# Patient Record
Sex: FEMALE | Race: WHITE | Employment: FULL TIME | URBAN - METROPOLITAN AREA
[De-identification: names, ages, dates, MRNs, and addresses within clinical notes are randomized per-mention and may not be internally consistent; named-entity substitution may affect disease eponyms.]

---

## 2017-05-15 RX ORDER — ESTRADIOL 1 MG/1
TABLET ORAL
Qty: 30 TABLET | Refills: 1 | Status: SHIPPED | OUTPATIENT
Start: 2017-05-15 | End: 2017-07-24 | Stop reason: SDUPTHER

## 2017-05-20 ENCOUNTER — EMPLOYEE WELLNESS (OUTPATIENT)
Dept: OTHER | Age: 52
End: 2017-05-20

## 2017-05-20 LAB
CHOLESTEROL/HDL RATIO: 5.5
CHOLESTEROL: 246 MG/DL
GLUCOSE BLD-MCNC: 101 MG/DL (ref 70–99)
HDLC SERPL-MCNC: 45 MG/DL
LDL CHOLESTEROL: 160 MG/DL (ref 0–130)
PATIENT FASTING?: ABNORMAL
TRIGL SERPL-MCNC: 207 MG/DL
VLDLC SERPL CALC-MCNC: ABNORMAL MG/DL (ref 1–30)

## 2017-07-14 ENCOUNTER — APPOINTMENT (OUTPATIENT)
Dept: GENERAL RADIOLOGY | Age: 52
End: 2017-07-14
Payer: COMMERCIAL

## 2017-07-14 ENCOUNTER — HOSPITAL ENCOUNTER (EMERGENCY)
Age: 52
Discharge: HOME | End: 2017-07-15
Attending: EMERGENCY MEDICINE
Payer: COMMERCIAL

## 2017-07-14 VITALS
OXYGEN SATURATION: 96 % | WEIGHT: 200 LBS | HEART RATE: 68 BPM | TEMPERATURE: 97.7 F | BODY MASS INDEX: 32.28 KG/M2 | SYSTOLIC BLOOD PRESSURE: 168 MMHG | DIASTOLIC BLOOD PRESSURE: 84 MMHG | RESPIRATION RATE: 20 BRPM

## 2017-07-14 DIAGNOSIS — R68.84 JAW PAIN: Primary | ICD-10-CM

## 2017-07-14 PROCEDURE — 99283 EMERGENCY DEPT VISIT LOW MDM: CPT

## 2017-07-14 PROCEDURE — 96372 THER/PROPH/DIAG INJ SC/IM: CPT

## 2017-07-14 PROCEDURE — 70110 X-RAY EXAM OF JAW 4/> VIEWS: CPT

## 2017-07-14 PROCEDURE — 6360000002 HC RX W HCPCS: Performed by: EMERGENCY MEDICINE

## 2017-07-14 RX ORDER — FENTANYL CITRATE 50 UG/ML
100 INJECTION, SOLUTION INTRAMUSCULAR; INTRAVENOUS ONCE
Status: COMPLETED | OUTPATIENT
Start: 2017-07-14 | End: 2017-07-14

## 2017-07-14 RX ADMIN — FENTANYL CITRATE 100 MCG: 50 INJECTION INTRAMUSCULAR; INTRAVENOUS at 23:38

## 2017-07-14 ASSESSMENT — PAIN DESCRIPTION - FREQUENCY: FREQUENCY: CONTINUOUS

## 2017-07-14 ASSESSMENT — PAIN DESCRIPTION - ORIENTATION: ORIENTATION: LEFT

## 2017-07-14 ASSESSMENT — PAIN SCALES - GENERAL
PAINLEVEL_OUTOF10: 7
PAINLEVEL_OUTOF10: 8

## 2017-07-14 ASSESSMENT — PAIN DESCRIPTION - DESCRIPTORS: DESCRIPTORS: CONSTANT

## 2017-07-14 ASSESSMENT — PAIN DESCRIPTION - PAIN TYPE: TYPE: ACUTE PAIN

## 2017-07-14 ASSESSMENT — PAIN DESCRIPTION - ONSET: ONSET: SUDDEN

## 2017-07-14 ASSESSMENT — PAIN DESCRIPTION - LOCATION: LOCATION: JAW

## 2017-07-15 PROCEDURE — 96372 THER/PROPH/DIAG INJ SC/IM: CPT

## 2017-07-15 PROCEDURE — 6360000002 HC RX W HCPCS: Performed by: EMERGENCY MEDICINE

## 2017-07-15 RX ORDER — IBUPROFEN 800 MG/1
800 TABLET ORAL EVERY 8 HOURS PRN
Qty: 30 TABLET | Refills: 0 | Status: SHIPPED | OUTPATIENT
Start: 2017-07-15 | End: 2019-11-07

## 2017-07-15 RX ORDER — DIAZEPAM 5 MG/ML
2.5 INJECTION, SOLUTION INTRAMUSCULAR; INTRAVENOUS ONCE
Status: COMPLETED | OUTPATIENT
Start: 2017-07-15 | End: 2017-07-15

## 2017-07-15 RX ORDER — DIAZEPAM 5 MG/1
5 TABLET ORAL EVERY 8 HOURS PRN
Qty: 15 TABLET | Refills: 0 | Status: SHIPPED | OUTPATIENT
Start: 2017-07-15 | End: 2017-07-25

## 2017-07-15 RX ORDER — HYDROCODONE BITARTRATE AND ACETAMINOPHEN 5; 325 MG/1; MG/1
1 TABLET ORAL EVERY 8 HOURS PRN
Qty: 5 TABLET | Refills: 0 | Status: SHIPPED | OUTPATIENT
Start: 2017-07-15 | End: 2017-07-22

## 2017-07-15 RX ADMIN — DIAZEPAM 2.5 MG: 5 INJECTION, SOLUTION INTRAMUSCULAR; INTRAVENOUS at 00:24

## 2017-07-15 ASSESSMENT — ENCOUNTER SYMPTOMS
SORE THROAT: 0
FACIAL SWELLING: 0
SHORTNESS OF BREATH: 0
TROUBLE SWALLOWING: 0

## 2017-07-24 RX ORDER — ESTRADIOL 1 MG/1
TABLET ORAL
Qty: 30 TABLET | Refills: 0 | Status: SHIPPED | OUTPATIENT
Start: 2017-07-24 | End: 2018-10-24 | Stop reason: ALTCHOICE

## 2017-07-27 ENCOUNTER — OFFICE VISIT (OUTPATIENT)
Dept: OBGYN CLINIC | Age: 52
End: 2017-07-27
Payer: COMMERCIAL

## 2017-07-27 VITALS
HEIGHT: 66 IN | DIASTOLIC BLOOD PRESSURE: 80 MMHG | SYSTOLIC BLOOD PRESSURE: 120 MMHG | WEIGHT: 234 LBS | BODY MASS INDEX: 37.61 KG/M2

## 2017-07-27 DIAGNOSIS — Z12.31 ENCOUNTER FOR SCREENING MAMMOGRAM FOR MALIGNANT NEOPLASM OF BREAST: Primary | ICD-10-CM

## 2017-07-27 PROCEDURE — 99396 PREV VISIT EST AGE 40-64: CPT | Performed by: OBSTETRICS & GYNECOLOGY

## 2017-07-27 ASSESSMENT — ENCOUNTER SYMPTOMS
ABDOMINAL DISTENTION: 0
SHORTNESS OF BREATH: 0
ABDOMINAL PAIN: 0
BACK PAIN: 0
COUGH: 0
DIARRHEA: 0
CONSTIPATION: 0

## 2017-08-30 ENCOUNTER — HOSPITAL ENCOUNTER (OUTPATIENT)
Dept: MAMMOGRAPHY | Age: 52
Discharge: HOME | End: 2017-08-30
Payer: COMMERCIAL

## 2017-08-30 DIAGNOSIS — Z12.31 ENCOUNTER FOR SCREENING MAMMOGRAM FOR MALIGNANT NEOPLASM OF BREAST: ICD-10-CM

## 2017-08-30 PROCEDURE — G0202 SCR MAMMO BI INCL CAD: HCPCS

## 2017-09-14 ENCOUNTER — ANESTHESIA EVENT (OUTPATIENT)
Dept: OPERATING ROOM | Age: 52
End: 2017-09-14
Payer: COMMERCIAL

## 2017-09-15 ENCOUNTER — HOSPITAL ENCOUNTER (OUTPATIENT)
Age: 52
Setting detail: OUTPATIENT SURGERY
Discharge: HOME | End: 2017-09-15
Attending: SURGERY | Admitting: SURGERY
Payer: COMMERCIAL

## 2017-09-15 ENCOUNTER — ANESTHESIA (OUTPATIENT)
Dept: OPERATING ROOM | Age: 52
End: 2017-09-15
Payer: COMMERCIAL

## 2017-09-15 VITALS
BODY MASS INDEX: 37.61 KG/M2 | HEART RATE: 62 BPM | OXYGEN SATURATION: 96 % | DIASTOLIC BLOOD PRESSURE: 60 MMHG | HEIGHT: 66 IN | RESPIRATION RATE: 16 BRPM | SYSTOLIC BLOOD PRESSURE: 144 MMHG | TEMPERATURE: 97.5 F | WEIGHT: 234 LBS

## 2017-09-15 VITALS — DIASTOLIC BLOOD PRESSURE: 68 MMHG | OXYGEN SATURATION: 97 % | SYSTOLIC BLOOD PRESSURE: 160 MMHG

## 2017-09-15 PROCEDURE — 2500000003 HC RX 250 WO HCPCS: Performed by: ANESTHESIOLOGY

## 2017-09-15 PROCEDURE — 3700000000 HC ANESTHESIA ATTENDED CARE: Performed by: SURGERY

## 2017-09-15 PROCEDURE — 7100000001 HC PACU RECOVERY - ADDTL 15 MIN: Performed by: SURGERY

## 2017-09-15 PROCEDURE — 87077 CULTURE AEROBIC IDENTIFY: CPT

## 2017-09-15 PROCEDURE — 88305 TISSUE EXAM BY PATHOLOGIST: CPT

## 2017-09-15 PROCEDURE — 7100000031 HC ASPR PHASE II RECOVERY - ADDTL 15 MIN: Performed by: SURGERY

## 2017-09-15 PROCEDURE — 6360000002 HC RX W HCPCS: Performed by: ANESTHESIOLOGY

## 2017-09-15 PROCEDURE — 7100000011 HC PHASE II RECOVERY - ADDTL 15 MIN: Performed by: SURGERY

## 2017-09-15 PROCEDURE — 2580000003 HC RX 258: Performed by: ANESTHESIOLOGY

## 2017-09-15 PROCEDURE — 3609012400 HC EGD TRANSORAL BIOPSY SINGLE/MULTIPLE: Performed by: SURGERY

## 2017-09-15 PROCEDURE — 88342 IMHCHEM/IMCYTCHM 1ST ANTB: CPT

## 2017-09-15 PROCEDURE — 7100000030 HC ASPR PHASE II RECOVERY - FIRST 15 MIN: Performed by: SURGERY

## 2017-09-15 PROCEDURE — 7100000010 HC PHASE II RECOVERY - FIRST 15 MIN: Performed by: SURGERY

## 2017-09-15 PROCEDURE — 7100000000 HC PACU RECOVERY - FIRST 15 MIN: Performed by: SURGERY

## 2017-09-15 PROCEDURE — 3700000001 HC ADD 15 MINUTES (ANESTHESIA): Performed by: SURGERY

## 2017-09-15 RX ORDER — SODIUM CHLORIDE, SODIUM LACTATE, POTASSIUM CHLORIDE, CALCIUM CHLORIDE 600; 310; 30; 20 MG/100ML; MG/100ML; MG/100ML; MG/100ML
INJECTION, SOLUTION INTRAVENOUS CONTINUOUS PRN
Status: DISCONTINUED | OUTPATIENT
Start: 2017-09-15 | End: 2017-09-15 | Stop reason: SDUPTHER

## 2017-09-15 RX ORDER — SODIUM CHLORIDE 0.9 % (FLUSH) 0.9 %
10 SYRINGE (ML) INJECTION PRN
Status: DISCONTINUED | OUTPATIENT
Start: 2017-09-15 | End: 2017-09-15 | Stop reason: HOSPADM

## 2017-09-15 RX ORDER — PROPOFOL 10 MG/ML
INJECTION, EMULSION INTRAVENOUS PRN
Status: DISCONTINUED | OUTPATIENT
Start: 2017-09-15 | End: 2017-09-15 | Stop reason: SDUPTHER

## 2017-09-15 RX ORDER — SODIUM CHLORIDE 0.9 % (FLUSH) 0.9 %
10 SYRINGE (ML) INJECTION EVERY 12 HOURS SCHEDULED
Status: DISCONTINUED | OUTPATIENT
Start: 2017-09-15 | End: 2017-09-15 | Stop reason: HOSPADM

## 2017-09-15 RX ORDER — SODIUM CHLORIDE, SODIUM LACTATE, POTASSIUM CHLORIDE, CALCIUM CHLORIDE 600; 310; 30; 20 MG/100ML; MG/100ML; MG/100ML; MG/100ML
INJECTION, SOLUTION INTRAVENOUS CONTINUOUS
Status: DISCONTINUED | OUTPATIENT
Start: 2017-09-15 | End: 2017-09-15 | Stop reason: HOSPADM

## 2017-09-15 RX ORDER — MIDAZOLAM HYDROCHLORIDE 1 MG/ML
INJECTION INTRAMUSCULAR; INTRAVENOUS PRN
Status: DISCONTINUED | OUTPATIENT
Start: 2017-09-15 | End: 2017-09-15 | Stop reason: SDUPTHER

## 2017-09-15 RX ORDER — MELOXICAM 7.5 MG/1
7.5 TABLET ORAL DAILY
COMMUNITY
End: 2018-08-02

## 2017-09-15 RX ORDER — LIDOCAINE HYDROCHLORIDE 10 MG/ML
INJECTION, SOLUTION INFILTRATION; PERINEURAL PRN
Status: DISCONTINUED | OUTPATIENT
Start: 2017-09-15 | End: 2017-09-15 | Stop reason: SDUPTHER

## 2017-09-15 RX ORDER — LIDOCAINE HYDROCHLORIDE 10 MG/ML
1 INJECTION, SOLUTION EPIDURAL; INFILTRATION; INTRACAUDAL; PERINEURAL
Status: DISCONTINUED | OUTPATIENT
Start: 2017-09-15 | End: 2017-09-15 | Stop reason: HOSPADM

## 2017-09-15 RX ADMIN — SODIUM CHLORIDE, POTASSIUM CHLORIDE, SODIUM LACTATE AND CALCIUM CHLORIDE: 600; 310; 30; 20 INJECTION, SOLUTION INTRAVENOUS at 11:05

## 2017-09-15 RX ADMIN — SODIUM CHLORIDE, POTASSIUM CHLORIDE, SODIUM LACTATE AND CALCIUM CHLORIDE: 600; 310; 30; 20 INJECTION, SOLUTION INTRAVENOUS at 10:05

## 2017-09-15 RX ADMIN — PROPOFOL 100 MG: 10 INJECTION, EMULSION INTRAVENOUS at 11:09

## 2017-09-15 RX ADMIN — MIDAZOLAM 2 MG: 1 INJECTION INTRAMUSCULAR; INTRAVENOUS at 11:05

## 2017-09-15 RX ADMIN — PROPOFOL 25 MG: 10 INJECTION, EMULSION INTRAVENOUS at 11:15

## 2017-09-15 RX ADMIN — LIDOCAINE HYDROCHLORIDE 2 ML: 10 INJECTION, SOLUTION INFILTRATION; PERINEURAL at 11:10

## 2017-09-15 RX ADMIN — PROPOFOL 50 MG: 10 INJECTION, EMULSION INTRAVENOUS at 11:10

## 2017-09-15 RX ADMIN — PROPOFOL 25 MG: 10 INJECTION, EMULSION INTRAVENOUS at 11:20

## 2017-09-15 ASSESSMENT — PAIN SCALES - GENERAL
PAINLEVEL_OUTOF10: 0

## 2017-09-15 ASSESSMENT — PAIN - FUNCTIONAL ASSESSMENT: PAIN_FUNCTIONAL_ASSESSMENT: 0-10

## 2017-09-19 LAB — SURGICAL PATHOLOGY REPORT: NORMAL

## 2018-03-20 VITALS — BODY MASS INDEX: 37.28 KG/M2 | WEIGHT: 231 LBS

## 2018-05-21 ENCOUNTER — EMPLOYEE WELLNESS (OUTPATIENT)
Dept: OTHER | Age: 53
End: 2018-05-21

## 2018-05-21 LAB
CHOLESTEROL/HDL RATIO: 4.8
CHOLESTEROL: 225 MG/DL
GLUCOSE BLD-MCNC: 94 MG/DL (ref 70–99)
HDLC SERPL-MCNC: 47 MG/DL
LDL CHOLESTEROL: 137 MG/DL (ref 0–130)
PATIENT FASTING?: ABNORMAL
TRIGL SERPL-MCNC: 203 MG/DL
VLDLC SERPL CALC-MCNC: ABNORMAL MG/DL (ref 1–30)

## 2018-05-25 ENCOUNTER — TELEPHONE (OUTPATIENT)
Dept: PHARMACY | Facility: CLINIC | Age: 53
End: 2018-05-25

## 2018-05-29 VITALS — WEIGHT: 226 LBS | BODY MASS INDEX: 36.48 KG/M2

## 2018-06-25 ENCOUNTER — E-VISIT (OUTPATIENT)
Dept: OBGYN CLINIC | Age: 53
End: 2018-06-25
Payer: COMMERCIAL

## 2018-06-25 DIAGNOSIS — J00 NASOPHARYNGITIS ACUTE: Primary | ICD-10-CM

## 2018-06-25 PROCEDURE — 99444 PR PHYSICIAN ONLINE EVALUATION & MANAGEMENT SERVICE: CPT | Performed by: FAMILY MEDICINE

## 2018-06-25 RX ORDER — AMOXICILLIN 500 MG/1
500 CAPSULE ORAL 3 TIMES DAILY
Qty: 30 CAPSULE | Refills: 0 | Status: SHIPPED | OUTPATIENT
Start: 2018-06-25 | End: 2018-07-05

## 2018-06-25 ASSESSMENT — LIFESTYLE VARIABLES: SMOKING_STATUS: NO

## 2018-07-16 ENCOUNTER — HOSPITAL ENCOUNTER (OUTPATIENT)
Age: 53
Discharge: HOME | End: 2018-07-16
Payer: COMMERCIAL

## 2018-07-16 LAB
CORTISOL COLLECTION INFO: NORMAL
CORTISOL: 15 UG/DL (ref 2.7–18.4)
DHEAS (DHEA SULFATE): 21.7 UG/DL (ref 26–200)
ESTRADIOL LEVEL: 10 PG/ML (ref 5–50)
FERRITIN: 138 UG/L (ref 13–150)
PROGESTERONE LEVEL: 0.11 NG/ML
T3 TOTAL: 130 NG/DL (ref 80–200)
T4 TOTAL: 10.2 UG/DL (ref 4.5–12)
TESTOSTERONE TOTAL: <3 NG/DL (ref 20–70)
THYROGLOBULIN AB: 1103 IU/ML (ref 0–40)
THYROGLOBULIN: <0.2 NG/ML (ref 0–63.4)
THYROID PEROXIDASE (TPO) AB: 83.5 IU/ML (ref 0–35)
TSH SERPL DL<=0.05 MIU/L-ACNC: 0.02 MIU/L (ref 0.3–5)
VITAMIN D 25-HYDROXY: 38.2 NG/ML (ref 30–100)

## 2018-07-16 PROCEDURE — 36415 COLL VENOUS BLD VENIPUNCTURE: CPT

## 2018-07-16 PROCEDURE — 82306 VITAMIN D 25 HYDROXY: CPT

## 2018-07-16 PROCEDURE — 86376 MICROSOMAL ANTIBODY EACH: CPT

## 2018-07-16 PROCEDURE — 82627 DEHYDROEPIANDROSTERONE: CPT

## 2018-07-16 PROCEDURE — 84432 ASSAY OF THYROGLOBULIN: CPT

## 2018-07-16 PROCEDURE — 82533 TOTAL CORTISOL: CPT

## 2018-07-16 PROCEDURE — 84480 ASSAY TRIIODOTHYRONINE (T3): CPT

## 2018-07-16 PROCEDURE — 84436 ASSAY OF TOTAL THYROXINE: CPT

## 2018-07-16 PROCEDURE — 82679 ASSAY OF ESTRONE: CPT

## 2018-07-16 PROCEDURE — 84443 ASSAY THYROID STIM HORMONE: CPT

## 2018-07-16 PROCEDURE — 84403 ASSAY OF TOTAL TESTOSTERONE: CPT

## 2018-07-16 PROCEDURE — 86800 THYROGLOBULIN ANTIBODY: CPT

## 2018-07-16 PROCEDURE — 84482 T3 REVERSE: CPT

## 2018-07-16 PROCEDURE — 82670 ASSAY OF TOTAL ESTRADIOL: CPT

## 2018-07-16 PROCEDURE — 84144 ASSAY OF PROGESTERONE: CPT

## 2018-07-16 PROCEDURE — 82728 ASSAY OF FERRITIN: CPT

## 2018-07-19 LAB
ESTRONE: 18.4 PG/ML
T3 REVERSE: 16.5 NG/DL (ref 9–27)

## 2018-10-17 ENCOUNTER — HOSPITAL ENCOUNTER (OUTPATIENT)
Dept: MAMMOGRAPHY | Age: 53
Discharge: HOME | End: 2018-10-19
Payer: COMMERCIAL

## 2018-10-17 DIAGNOSIS — Z12.39 ENCOUNTER FOR SPECIAL SCREENING EXAMINATION FOR NEOPLASM OF BREAST: ICD-10-CM

## 2018-10-17 PROCEDURE — 77067 SCR MAMMO BI INCL CAD: CPT

## 2018-10-24 ENCOUNTER — OFFICE VISIT (OUTPATIENT)
Dept: FAMILY MEDICINE CLINIC | Age: 53
End: 2018-10-24
Payer: COMMERCIAL

## 2018-10-24 VITALS
BODY MASS INDEX: 36.82 KG/M2 | SYSTOLIC BLOOD PRESSURE: 120 MMHG | WEIGHT: 221 LBS | RESPIRATION RATE: 16 BRPM | HEART RATE: 70 BPM | HEIGHT: 65 IN | DIASTOLIC BLOOD PRESSURE: 78 MMHG | OXYGEN SATURATION: 98 % | TEMPERATURE: 98.5 F

## 2018-10-24 DIAGNOSIS — F41.9 ANXIETY: ICD-10-CM

## 2018-10-24 DIAGNOSIS — E07.9 THYROID DISEASE: ICD-10-CM

## 2018-10-24 DIAGNOSIS — K21.9 GASTROESOPHAGEAL REFLUX DISEASE WITHOUT ESOPHAGITIS: Primary | ICD-10-CM

## 2018-10-24 DIAGNOSIS — E78.2 MIXED HYPERLIPIDEMIA: ICD-10-CM

## 2018-10-24 PROBLEM — E78.00 PURE HYPERCHOLESTEROLEMIA: Status: RESOLVED | Noted: 2018-10-24 | Resolved: 2018-10-24

## 2018-10-24 PROBLEM — E78.00 PURE HYPERCHOLESTEROLEMIA: Status: ACTIVE | Noted: 2018-10-24

## 2018-10-24 PROCEDURE — 99204 OFFICE O/P NEW MOD 45 MIN: CPT | Performed by: FAMILY MEDICINE

## 2018-10-24 RX ORDER — LEVOTHYROXINE SODIUM 0.12 MG/1
125 TABLET ORAL DAILY
COMMUNITY
End: 2019-04-17 | Stop reason: SDUPTHER

## 2018-10-24 RX ORDER — FLUTICASONE PROPIONATE 50 MCG
1 SPRAY, SUSPENSION (ML) NASAL DAILY
Qty: 1 BOTTLE | Refills: 3
Start: 2018-10-24 | End: 2019-06-03 | Stop reason: SDUPTHER

## 2018-10-24 ASSESSMENT — ENCOUNTER SYMPTOMS
NAUSEA: 0
CONSTIPATION: 0
SHORTNESS OF BREATH: 0
SORE THROAT: 0
DIARRHEA: 0
CHEST TIGHTNESS: 0
BACK PAIN: 0
COUGH: 0
ABDOMINAL PAIN: 0
RHINORRHEA: 0
ABDOMINAL DISTENTION: 0
VOMITING: 0

## 2018-11-21 RX ORDER — ESTRADIOL 10 UG/1
INSERT VAGINAL
Qty: 8 EACH | Refills: 11 | Status: SHIPPED | OUTPATIENT
Start: 2018-11-21 | End: 2020-07-30 | Stop reason: ALTCHOICE

## 2018-12-19 ENCOUNTER — E-VISIT (OUTPATIENT)
Dept: FAMILY MEDICINE CLINIC | Age: 53
End: 2018-12-19
Payer: COMMERCIAL

## 2018-12-19 PROCEDURE — 99444 PR PHYSICIAN ONLINE EVALUATION & MANAGEMENT SERVICE: CPT | Performed by: FAMILY MEDICINE

## 2018-12-19 RX ORDER — SULFAMETHOXAZOLE AND TRIMETHOPRIM 800; 160 MG/1; MG/1
1 TABLET ORAL 2 TIMES DAILY
Qty: 14 TABLET | Refills: 0 | Status: SHIPPED | OUTPATIENT
Start: 2018-12-19 | End: 2018-12-26

## 2019-04-11 ENCOUNTER — EMPLOYEE WELLNESS (OUTPATIENT)
Dept: OTHER | Age: 54
End: 2019-04-11

## 2019-04-11 LAB
CHOLESTEROL/HDL RATIO: 4.8
CHOLESTEROL: 217 MG/DL
GLUCOSE BLD-MCNC: 105 MG/DL (ref 70–99)
HDLC SERPL-MCNC: 45 MG/DL
LDL CHOLESTEROL: 139 MG/DL (ref 0–130)
PATIENT FASTING?: YES
TRIGL SERPL-MCNC: 166 MG/DL
VLDLC SERPL CALC-MCNC: ABNORMAL MG/DL (ref 1–30)

## 2019-04-17 RX ORDER — LEVOTHYROXINE SODIUM 0.12 MG/1
125 TABLET ORAL DAILY
Qty: 30 TABLET | Refills: 11 | Status: SHIPPED | OUTPATIENT
Start: 2019-04-17 | End: 2019-06-03 | Stop reason: SDUPTHER

## 2019-05-06 VITALS — BODY MASS INDEX: 37.61 KG/M2 | WEIGHT: 226 LBS

## 2019-06-03 ENCOUNTER — OFFICE VISIT (OUTPATIENT)
Dept: FAMILY MEDICINE CLINIC | Age: 54
End: 2019-06-03
Payer: COMMERCIAL

## 2019-06-03 ENCOUNTER — HOSPITAL ENCOUNTER (OUTPATIENT)
Age: 54
Setting detail: SPECIMEN
Discharge: HOME | End: 2019-06-03
Payer: COMMERCIAL

## 2019-06-03 VITALS
TEMPERATURE: 99.2 F | SYSTOLIC BLOOD PRESSURE: 106 MMHG | WEIGHT: 230 LBS | OXYGEN SATURATION: 96 % | RESPIRATION RATE: 16 BRPM | HEART RATE: 74 BPM | BODY MASS INDEX: 38.27 KG/M2 | DIASTOLIC BLOOD PRESSURE: 74 MMHG

## 2019-06-03 DIAGNOSIS — R73.9 HYPERGLYCEMIA: ICD-10-CM

## 2019-06-03 DIAGNOSIS — E03.9 HYPOTHYROIDISM, UNSPECIFIED TYPE: ICD-10-CM

## 2019-06-03 DIAGNOSIS — E78.2 MIXED HYPERLIPIDEMIA: ICD-10-CM

## 2019-06-03 DIAGNOSIS — F41.9 ANXIETY: ICD-10-CM

## 2019-06-03 DIAGNOSIS — E07.9 THYROID DISEASE: ICD-10-CM

## 2019-06-03 DIAGNOSIS — N93.9 VAGINA BLEEDING: ICD-10-CM

## 2019-06-03 DIAGNOSIS — K21.9 GASTROESOPHAGEAL REFLUX DISEASE WITHOUT ESOPHAGITIS: ICD-10-CM

## 2019-06-03 DIAGNOSIS — R32 URINARY INCONTINENCE, UNSPECIFIED TYPE: ICD-10-CM

## 2019-06-03 DIAGNOSIS — E78.2 MIXED HYPERLIPIDEMIA: Primary | ICD-10-CM

## 2019-06-03 LAB
ALBUMIN SERPL-MCNC: 4.2 G/DL (ref 3.5–5.2)
ALBUMIN/GLOBULIN RATIO: 1.6 (ref 1–2.5)
ALP BLD-CCNC: 73 U/L (ref 35–104)
ALT SERPL-CCNC: 20 U/L (ref 5–33)
ANION GAP SERPL CALCULATED.3IONS-SCNC: 16 MMOL/L (ref 9–17)
AST SERPL-CCNC: 15 U/L
BILIRUB SERPL-MCNC: 0.3 MG/DL (ref 0.3–1.2)
BUN BLDV-MCNC: 18 MG/DL (ref 6–20)
BUN/CREAT BLD: ABNORMAL (ref 9–20)
CALCIUM SERPL-MCNC: 9.6 MG/DL (ref 8.6–10.4)
CHLORIDE BLD-SCNC: 105 MMOL/L (ref 98–107)
CO2: 24 MMOL/L (ref 20–31)
CREAT SERPL-MCNC: 0.63 MG/DL (ref 0.5–0.9)
GFR AFRICAN AMERICAN: >60 ML/MIN
GFR NON-AFRICAN AMERICAN: >60 ML/MIN
GFR SERPL CREATININE-BSD FRML MDRD: ABNORMAL ML/MIN/{1.73_M2}
GFR SERPL CREATININE-BSD FRML MDRD: ABNORMAL ML/MIN/{1.73_M2}
GLUCOSE BLD-MCNC: 94 MG/DL (ref 70–99)
HBA1C MFR BLD: 5.8 %
HCT VFR BLD CALC: 41.6 % (ref 36.3–47.1)
HEMOGLOBIN: 13.2 G/DL (ref 11.9–15.1)
MCH RBC QN AUTO: 28 PG (ref 25.2–33.5)
MCHC RBC AUTO-ENTMCNC: 31.7 G/DL (ref 28.4–34.8)
MCV RBC AUTO: 88.1 FL (ref 82.6–102.9)
NRBC AUTOMATED: 0 PER 100 WBC
PDW BLD-RTO: 13.2 % (ref 11.8–14.4)
PLATELET # BLD: 314 K/UL (ref 138–453)
PMV BLD AUTO: 10 FL (ref 8.1–13.5)
POTASSIUM SERPL-SCNC: 4.4 MMOL/L (ref 3.7–5.3)
RBC # BLD: 4.72 M/UL (ref 3.95–5.11)
SODIUM BLD-SCNC: 145 MMOL/L (ref 135–144)
THYROXINE, FREE: 1.75 NG/DL (ref 0.93–1.7)
TOTAL PROTEIN: 6.8 G/DL (ref 6.4–8.3)
TSH SERPL DL<=0.05 MIU/L-ACNC: 0.02 MIU/L (ref 0.3–5)
WBC # BLD: 7 K/UL (ref 3.5–11.3)

## 2019-06-03 PROCEDURE — 99214 OFFICE O/P EST MOD 30 MIN: CPT | Performed by: FAMILY MEDICINE

## 2019-06-03 PROCEDURE — 83036 HEMOGLOBIN GLYCOSYLATED A1C: CPT | Performed by: FAMILY MEDICINE

## 2019-06-03 RX ORDER — LEVOTHYROXINE SODIUM 0.12 MG/1
125 TABLET ORAL DAILY
Qty: 90 TABLET | Refills: 3 | Status: SHIPPED | OUTPATIENT
Start: 2019-06-03 | End: 2019-06-04 | Stop reason: SDUPTHER

## 2019-06-03 RX ORDER — CELECOXIB 100 MG/1
100 CAPSULE ORAL 2 TIMES DAILY
Qty: 180 CAPSULE | Refills: 3 | Status: SHIPPED | OUTPATIENT
Start: 2019-06-03 | End: 2019-08-09 | Stop reason: SDUPTHER

## 2019-06-03 RX ORDER — FLUOXETINE HYDROCHLORIDE 20 MG/1
CAPSULE ORAL
Qty: 90 CAPSULE | Refills: 3 | Status: SHIPPED | OUTPATIENT
Start: 2019-06-03 | End: 2020-06-15

## 2019-06-03 RX ORDER — FLUTICASONE PROPIONATE 50 MCG
1 SPRAY, SUSPENSION (ML) NASAL DAILY
Qty: 1 BOTTLE | Refills: 3 | Status: SHIPPED | OUTPATIENT
Start: 2019-06-03 | End: 2019-12-06 | Stop reason: SDUPTHER

## 2019-06-03 RX ORDER — PRAVASTATIN SODIUM 40 MG
40 TABLET ORAL DAILY
Qty: 90 TABLET | Refills: 3 | Status: SHIPPED | OUTPATIENT
Start: 2019-06-03 | End: 2020-06-15

## 2019-06-03 RX ORDER — OMEPRAZOLE 20 MG/1
20 CAPSULE, DELAYED RELEASE ORAL DAILY
Qty: 90 CAPSULE | Refills: 3 | Status: SHIPPED | OUTPATIENT
Start: 2019-06-03 | End: 2020-06-15

## 2019-06-03 ASSESSMENT — ENCOUNTER SYMPTOMS
ABDOMINAL DISTENTION: 0
VOMITING: 0
RHINORRHEA: 0
NAUSEA: 0
ABDOMINAL PAIN: 0
SHORTNESS OF BREATH: 0
CHEST TIGHTNESS: 0
COUGH: 0
SORE THROAT: 0
BACK PAIN: 0
DIARRHEA: 0
CONSTIPATION: 0

## 2019-06-03 ASSESSMENT — PATIENT HEALTH QUESTIONNAIRE - PHQ9
SUM OF ALL RESPONSES TO PHQ QUESTIONS 1-9: 0
SUM OF ALL RESPONSES TO PHQ QUESTIONS 1-9: 0
SUM OF ALL RESPONSES TO PHQ9 QUESTIONS 1 & 2: 0
2. FEELING DOWN, DEPRESSED OR HOPELESS: 0
1. LITTLE INTEREST OR PLEASURE IN DOING THINGS: 0

## 2019-06-03 NOTE — PROGRESS NOTES
Subjective:      Patient ID: Clem Scherer is a 62 y.o. female. HPI  Pt here today for f/u on chronic medical problems:  HLD, GERD, Anxiety, Hyperglycemia, Hypothyroidism,go over labs and/or diagnostic studies, and medication refills. Pt today denies any HA, chest pain, or SOB. Pt denies any N/V/D/C or abdominal pain. Pt denies any fever or chills. Pt with occasional heartburn, but stable on meds. Pt states mood is stable on meds. Pt has had episodes of vaginal spotting. She did have a complete hysterectomy in 2013. She is on compounding HRT through [employer]. Pt had her labs done this morning. Otherwise pt doing well on current tx and no other concerns today. The patient's past medical, surgical, social, and family history as well as his current medications and allergies were reviewed as documented in today's encounter. Current Outpatient Medications   Medication Sig Dispense Refill    fluticasone (FLONASE) 50 MCG/ACT nasal spray 1 spray by Each Nostril route daily 1 Bottle 3    celecoxib (CELEBREX) 100 MG capsule Take 1 capsule by mouth 2 times daily 180 capsule 3    pravastatin (PRAVACHOL) 40 MG tablet Take 1 tablet by mouth daily 90 tablet 3    omeprazole (PRILOSEC) 20 MG delayed release capsule Take 1 capsule by mouth Daily 90 capsule 3    FLUoxetine (PROZAC) 20 MG capsule TAKE 1 CAPSULE BY MOUTH ONE TIME A DAY 90 capsule 3    levothyroxine (SYNTHROID) 125 MCG tablet Take 1 tablet by mouth Daily 90 tablet 3    IMVEXXY MAINTENANCE PACK 10 MCG INST Place 1 insert vaginally twice weekly on monday and thursday. 8 each 11    ibuprofen (ADVIL;MOTRIN) 800 MG tablet Take 1 tablet by mouth every 8 hours as needed for Pain 30 tablet 0    therapeutic multivitamin-minerals (THERAGRAN-M) tablet Take 1 tablet by mouth daily.  Nutritional Supplements (MELATONIN PO) Take 5 mg by mouth nightly as needed. No current facility-administered medications for this visit.       Review of Systems   Constitutional: Negative for appetite change, fatigue and fever. HENT: Negative for congestion, ear pain, rhinorrhea and sore throat. Respiratory: Negative for cough, chest tightness and shortness of breath. Cardiovascular: Negative for chest pain and palpitations. Gastrointestinal: Negative for abdominal distention, abdominal pain, constipation, diarrhea, nausea and vomiting. Positive heartburn   Genitourinary: Negative for difficulty urinating and dysuria. Vaginal spotting   Musculoskeletal: Negative for arthralgias, back pain and myalgias. Skin: Negative for rash. Neurological: Negative for dizziness, weakness, light-headedness and headaches. Hematological: Negative for adenopathy. Psychiatric/Behavioral: Negative for behavioral problems and sleep disturbance. The patient is nervous/anxious (stable). Objective:   Physical Exam   Constitutional: She is oriented to person, place, and time. She appears well-developed. No distress. HENT:   Head: Normocephalic and atraumatic. Right Ear: External ear normal.   Left Ear: External ear normal.   Nose: Nose normal.   Mouth/Throat: Oropharynx is clear and moist. No oropharyngeal exudate. Eyes: Pupils are equal, round, and reactive to light. Conjunctivae and EOM are normal.   Neck: Normal range of motion. Cardiovascular: Normal rate, regular rhythm, normal heart sounds and intact distal pulses. No murmur heard. Pulmonary/Chest: Effort normal and breath sounds normal. No respiratory distress. She has no wheezes. She exhibits no tenderness. Abdominal: Soft. Bowel sounds are normal. She exhibits no distension and no mass. There is no tenderness. Musculoskeletal: Normal range of motion. She exhibits no edema or tenderness. Lymphadenopathy:     She has no cervical adenopathy. Neurological: She is alert and oriented to person, place, and time. She has normal reflexes. Skin: No rash noted.    Psychiatric: She has a normal mood and affect. Her behavior is normal.   Vitals reviewed. Be Well Within Labs reviewed with pt today. HGBA1C done in the office was 5.8%    Assessment:       Diagnosis Orders   1. Mixed hyperlipidemia  CBC    Lipid Panel   2. Gastroesophageal reflux disease without esophagitis     3. Anxiety  FLUoxetine (PROZAC) 20 MG capsule   4. Hypothyroidism, unspecified type  T4, Free    TSH without Reflex   5. Hyperglycemia  POCT glycosylated hemoglobin (Hb A1C)    Comprehensive Metabolic Panel    Hemoglobin A1C   6. Urinary incontinence, unspecified type     7.  Vagina bleeding  US Pelvis Complete         Plan:      Orders Placed This Encounter   Procedures    US Pelvis Complete     Standing Status:   Future     Standing Expiration Date:   2019    CBC     Standing Status:   Future     Standing Expiration Date:   2020    Comprehensive Metabolic Panel     Standing Status:   Future     Standing Expiration Date:   2020    Hemoglobin A1C     Standing Status:   Future     Standing Expiration Date:   2020    Lipid Panel     Standing Status:   Future     Standing Expiration Date:   2020     Order Specific Question:   Is Patient Fasting?/# of Hours     Answer:   8-10 Hours    T4, Free     Standing Status:   Future     Standing Expiration Date:   2020    TSH without Reflex     Standing Status:   Future     Standing Expiration Date:   2020    POCT glycosylated hemoglobin (Hb A1C)      Orders Placed This Encounter   Medications    fluticasone (FLONASE) 50 MCG/ACT nasal spray     Si spray by Each Nostril route daily     Dispense:  1 Bottle     Refill:  3    celecoxib (CELEBREX) 100 MG capsule     Sig: Take 1 capsule by mouth 2 times daily     Dispense:  180 capsule     Refill:  3    pravastatin (PRAVACHOL) 40 MG tablet     Sig: Take 1 tablet by mouth daily     Dispense:  90 tablet     Refill:  3    omeprazole (PRILOSEC) 20 MG delayed release capsule     Sig: Take 1 capsule by mouth Daily     Dispense:  90 capsule     Refill:  3    FLUoxetine (PROZAC) 20 MG capsule     Sig: TAKE 1 CAPSULE BY MOUTH ONE TIME A DAY     Dispense:  90 capsule     Refill:  3    levothyroxine (SYNTHROID) 125 MCG tablet     Sig: Take 1 tablet by mouth Daily     Dispense:  90 tablet     Refill:  3     Please fill the 90 days and not the 30 days     Will cont with current treatment as pt is currently stable on current treatment    Will cont with low fat/chol diet and Pravachol as ordered    Continue to watch carbs secondary to increased Triglycerides and BS    Will proceed with pelvic US with the vaginal spotting and her having a hysterectomy. I did states to also talk with Alex at [facility] about stopping or changing the compounding HRT. If US negative and still with the spotting, will get her back with Gyn. Will call her with the results of her labs she had done today    Rest of systems unchanged, continue current treatments. Medications, labs, diagnostic studies, consultations and follow-up as documented in this encounter.  Rest of systems unchanged, continue current treatments        Patricia Muir MD

## 2019-06-03 NOTE — PROGRESS NOTES
Visit Information    Have you changed or started any medications since your last visit including any over-the-counter medicines, vitamins, or herbal medicines? no   Have you stopped taking any of your medications? Is so, why? -  no  Are you having any side effects from any of your medications? - no    Have you seen any other physician or provider since your last visit?  no   Have you had any other diagnostic tests since your last visit? yes - labs for be well   Have you been seen in the emergency room and/or had an admission in a hospital since we last saw you?  no   Have you had your routine dental cleaning in the past 6 months?  yes - 03/2019     Do you have an active MyChart account? If no, what is the barrier? Yes    Patient Care Team:  Javi Stephen MD as PCP - General (Family Medicine)  Javi Stephen MD as PCP [facility]  Soha Tripathi MD as Consulting Physician (Obstetrics & Gynecology)  Harish Roblero,  as Obstetrician (Obstetrics & Gynecology)    Medical History Review  Past Medical, Family, and Social History reviewed and does not contribute to the patient presenting condition    Health Maintenance   Topic Date Due    Diabetes screen  02/07/2003    Hepatitis C screen  10/24/2023 (Originally 1965)    HIV screen  10/24/2023 (Originally 2/7/1980)    TSH testing  07/16/2019    Breast cancer screen  10/17/2019    DTaP/Tdap/Td vaccine (2 - Td) 10/24/2023    Lipid screen  04/11/2024    Colon cancer screen colonoscopy  07/25/2024    Flu vaccine  Completed    Shingles Vaccine  Completed    Pneumococcal 0-64 years Vaccine  Aged Out     Patient/Caregiver verbalize understanding of medications.   Yes

## 2019-06-04 RX ORDER — LEVOTHYROXINE SODIUM 112 UG/1
112 TABLET ORAL DAILY
Qty: 90 TABLET | Refills: 3 | Status: SHIPPED | OUTPATIENT
Start: 2019-06-04 | End: 2019-06-18 | Stop reason: SDUPTHER

## 2019-06-13 ENCOUNTER — HOSPITAL ENCOUNTER (OUTPATIENT)
Dept: ULTRASOUND IMAGING | Age: 54
Discharge: HOME | End: 2019-06-15
Payer: COMMERCIAL

## 2019-06-13 DIAGNOSIS — N93.9 VAGINA BLEEDING: ICD-10-CM

## 2019-06-13 PROCEDURE — 76856 US EXAM PELVIC COMPLETE: CPT

## 2019-06-18 ENCOUNTER — TELEPHONE (OUTPATIENT)
Dept: FAMILY MEDICINE CLINIC | Age: 54
End: 2019-06-18

## 2019-06-18 RX ORDER — LEVOTHYROXINE SODIUM 112 UG/1
112 TABLET ORAL DAILY
Qty: 90 TABLET | Refills: 3 | Status: SHIPPED | OUTPATIENT
Start: 2019-06-18 | End: 2020-08-03 | Stop reason: SDUPTHER

## 2019-06-18 NOTE — TELEPHONE ENCOUNTER
Can you check on this at the pharmacy. I sent over Synthroid 112mcg on 6/4/19 and the pt says she got Synthroid 125mcg??? She should be on the Synthroid 112mcg. Looking at the result notes, I decreased the Synthroid to 112mcg and was sent to [facility] and I said to cancel the 125mcg at [facility].  Can they transfer the script from [facility] to [facility] for the Synthroid 112 mcg???

## 2019-06-18 NOTE — TELEPHONE ENCOUNTER
Patient states she picked up her levothyroxine and they gave her the 125mcg instead of the 112mcg. If she is to be taking the 112mcg, please send new script to [facility] [address] [facility].

## 2019-06-18 NOTE — TELEPHONE ENCOUNTER
Voicemail was left informing patient and asked that they return call to the office with any questions or concerns.

## 2019-06-24 ENCOUNTER — TELEPHONE (OUTPATIENT)
Dept: FAMILY MEDICINE CLINIC | Age: 54
End: 2019-06-24

## 2019-06-24 DIAGNOSIS — F41.8 SITUATIONAL ANXIETY: Primary | ICD-10-CM

## 2019-06-24 RX ORDER — ALPRAZOLAM 0.25 MG/1
TABLET ORAL
Qty: 30 TABLET | Refills: 0 | Status: SHIPPED | OUTPATIENT
Start: 2019-06-24 | End: 2019-07-24

## 2019-06-24 NOTE — TELEPHONE ENCOUNTER
Tell her I am so sorry and I will send in some Xanax to take prn. We will fill out the FMLA forms and let us know if she needs anything.

## 2019-06-24 NOTE — TELEPHONE ENCOUNTER
Patients daughter had twin boys and neither of them survived. Patient is not doing well with this. She can't sleep and would like something to help her, and please send it to [facility] [place] Would also like to know if we can fill out [place] paperwork for a couple weeks off. She only got 3 days of bereavement. Her first day off work was 06/19/19. I did give her the office fax number so if they approve it at her job she can have them fax it over.

## 2019-06-24 NOTE — TELEPHONE ENCOUNTER
Called patient and gave her instructions. She will send over her [facility] paperwork.

## 2019-06-27 ENCOUNTER — OFFICE VISIT (OUTPATIENT)
Dept: OBGYN CLINIC | Age: 54
End: 2019-06-27
Payer: COMMERCIAL

## 2019-06-27 VITALS
HEIGHT: 65 IN | RESPIRATION RATE: 18 BRPM | BODY MASS INDEX: 39.09 KG/M2 | WEIGHT: 234.6 LBS | SYSTOLIC BLOOD PRESSURE: 120 MMHG | DIASTOLIC BLOOD PRESSURE: 82 MMHG

## 2019-06-27 DIAGNOSIS — N84.2 VAGINAL POLYP: Primary | ICD-10-CM

## 2019-06-27 PROCEDURE — 99213 OFFICE O/P EST LOW 20 MIN: CPT | Performed by: OBSTETRICS & GYNECOLOGY

## 2019-06-27 NOTE — PROGRESS NOTES
Patient is a 62 y.o. Naomie Haywood  seen with total face to face time of 15 minutes. More than 50% of this visit was on counseling and education regarding her    Diagnosis Orders   1. Vaginal polyp      and her options. She was also counseled on her preventative health maintenance recommendations and follow-up. Blood pressure 120/82, resp. rate 18, height 5' 5\" (1.651 m), weight 234 lb 9.6 oz (106.4 kg), last menstrual period 10/06/2013, not currently breastfeeding.   Recent Results (from the past 8736 hour(s))   Estrone    Collection Time: 07/16/18  7:59 AM   Result Value Ref Range    Estrone 18.4 pg/mL   Thyroid Peroxidase Antibody    Collection Time: 07/16/18  7:59 AM   Result Value Ref Range    Thyroid Peroxidase (Tpo) Ab 83.5 (H) 0.0 - 35.0 IU/mL   T3, Reverse    Collection Time: 07/16/18  7:59 AM   Result Value Ref Range    T3, Reverse 16.5 9.0 - 27.0 ng/dL   Cortisol Total    Collection Time: 07/16/18  7:59 AM   Result Value Ref Range    Cortisol 15.0 2.7 - 18.4 ug/dL    Cortisol Collection Info AM    DHEA-Sulfate    Collection Time: 07/16/18  7:59 AM   Result Value Ref Range    DHEAS (DHEA Sulfate) 21.7 (L) 26 - 200 ug/dL   Estradiol    Collection Time: 07/16/18  7:59 AM   Result Value Ref Range    Estradiol 10 5 - 50 pg/mL   Ferritin    Collection Time: 07/16/18  7:59 AM   Result Value Ref Range    Ferritin 138 13 - 150 ug/L   Progesterone    Collection Time: 07/16/18  7:59 AM   Result Value Ref Range    Progesterone 0.11 ng/mL   T3    Collection Time: 07/16/18  7:59 AM   Result Value Ref Range    T3, Total 130 80 - 200 ng/dL   T4    Collection Time: 07/16/18  7:59 AM   Result Value Ref Range    T4, Total 10.2 4.5 - 12.0 ug/dL   Testosterone    Collection Time: 07/16/18  7:59 AM   Result Value Ref Range    Testosterone <3 (L) 20 - 70 ng/dL   Thyroglobulin    Collection Time: 07/16/18  7:59 AM   Result Value Ref Range    Thyroglobulin <0.2 0.0 - 63.4 ng/mL   Anti-Thyroglobulin Antibody    Collection Time: 07/16/18  7:59 AM   Result Value Ref Range    Thyroglobulin Ab 1103.0 (H) 0.0 - 40.0 IU/mL   TSH without Reflex    Collection Time: 07/16/18  7:59 AM   Result Value Ref Range    TSH 0.02 (L) 0.30 - 5.00 mIU/L   Vitamin D 25 Hydroxy    Collection Time: 07/16/18  7:59 AM   Result Value Ref Range    Vit D, 25-Hydroxy 38.2 30.0 - 100.0 ng/mL   Be Well Within Health Screen    Collection Time: 04/11/19  7:22 AM   Result Value Ref Range    Patient Fasting?  YES     Cholesterol 217 (H) <200 mg/dL    HDL 45 >40 mg/dL    LDL Cholesterol 139 (H) 0 - 130 mg/dL    Chol/HDL Ratio 4.8 <5    Triglycerides 166 (H) <150 mg/dL    VLDL NOT REPORTED (H) 1 - 30 mg/dL    Glucose 105 (H) 70 - 99 mg/dL   CBC    Collection Time: 06/03/19 11:00 AM   Result Value Ref Range    WBC 7.0 3.5 - 11.3 k/uL    RBC 4.72 3.95 - 5.11 m/uL    Hemoglobin 13.2 11.9 - 15.1 g/dL    Hematocrit 41.6 36.3 - 47.1 %    MCV 88.1 82.6 - 102.9 fL    MCH 28.0 25.2 - 33.5 pg    MCHC 31.7 28.4 - 34.8 g/dL    RDW 13.2 11.8 - 14.4 %    Platelets 950 502 - 027 k/uL    MPV 10.0 8.1 - 13.5 fL    NRBC Automated 0.0 0.0 per 100 WBC   Comprehensive Metabolic Panel    Collection Time: 06/03/19 11:00 AM   Result Value Ref Range    Glucose 94 70 - 99 mg/dL    BUN 18 6 - 20 mg/dL    CREATININE 0.63 0.50 - 0.90 mg/dL    Bun/Cre Ratio NOT REPORTED 9 - 20    Calcium 9.6 8.6 - 10.4 mg/dL    Sodium 145 (H) 135 - 144 mmol/L    Potassium 4.4 3.7 - 5.3 mmol/L    Chloride 105 98 - 107 mmol/L    CO2 24 20 - 31 mmol/L    Anion Gap 16 9 - 17 mmol/L    Alkaline Phosphatase 73 35 - 104 U/L    ALT 20 5 - 33 U/L    AST 15 <32 U/L    Total Bilirubin 0.30 0.3 - 1.2 mg/dL    Total Protein 6.8 6.4 - 8.3 g/dL    Alb 4.2 3.5 - 5.2 g/dL    Albumin/Globulin Ratio 1.6 1.0 - 2.5    GFR Non-African American >60 >60 mL/min    GFR African American >60 >60 mL/min    GFR Comment          GFR Staging NOT REPORTED    T4, Free    Collection Time: 06/03/19 11:00 AM   Result Value Ref Range    Thyroxine, Free 1.75 (H) 0.93 - 1.70 ng/dL   TSH without Reflex    Collection Time: 06/03/19 11:00 AM   Result Value Ref Range    TSH 0.02 (L) 0.30 - 5.00 mIU/L   POCT glycosylated hemoglobin (Hb A1C)    Collection Time: 06/03/19  3:52 PM   Result Value Ref Range    Hemoglobin A1C 5.8 %       The patient is noted vaginal bleeding for 6 to 8 weeks. She cannot relate it to bladder or bowel problems. She had a hysterectomy and a bladder repair in the past.  She is sexually active but that does not seem to affect the bleeding, though sex can be uncomfortable if she does not use lubricants. She gets estrogen/testosterone from [employer]. Her family doctor did an ultrasound which is negative  On exam, there is no external lesion. I can feel where the urethral sling was placed but there is no bleeding from that spot. However, at the very top of the vagina, where the incision line was from her hysterectomy, she has some polyps that are bleeding. I feel this should be biopsied, but it is difficult to do an irregular exam room, so I will bring her back to biopsy in the procedure room    IMP:   Encounter Diagnosis   Name Primary?     Vaginal polyp Yes         PLAN: RV for colposcopic biopsy

## 2019-07-01 ENCOUNTER — TELEPHONE (OUTPATIENT)
Dept: FAMILY MEDICINE CLINIC | Age: 54
End: 2019-07-01

## 2019-07-01 NOTE — TELEPHONE ENCOUNTER
Left a voicemail for the patient so we can decide on a date for her to return to work. FMLA forms are filled out, just need the date.

## 2019-07-10 ENCOUNTER — OFFICE VISIT (OUTPATIENT)
Dept: PODIATRY | Age: 54
End: 2019-07-10
Payer: COMMERCIAL

## 2019-07-10 VITALS — BODY MASS INDEX: 38.99 KG/M2 | WEIGHT: 234 LBS | HEIGHT: 65 IN

## 2019-07-10 DIAGNOSIS — M79.672 PAIN IN BOTH FEET: ICD-10-CM

## 2019-07-10 DIAGNOSIS — L60.0 OC (ONYCHOCRYPTOSIS): Primary | ICD-10-CM

## 2019-07-10 DIAGNOSIS — M79.671 PAIN IN BOTH FEET: ICD-10-CM

## 2019-07-10 PROCEDURE — 99203 OFFICE O/P NEW LOW 30 MIN: CPT | Performed by: PODIATRIST

## 2019-07-11 ENCOUNTER — PROCEDURE VISIT (OUTPATIENT)
Dept: OBGYN CLINIC | Age: 54
End: 2019-07-11
Payer: COMMERCIAL

## 2019-07-11 ENCOUNTER — HOSPITAL ENCOUNTER (OUTPATIENT)
Age: 54
Setting detail: SPECIMEN
Discharge: HOME | End: 2019-07-11
Payer: COMMERCIAL

## 2019-07-11 VITALS
SYSTOLIC BLOOD PRESSURE: 144 MMHG | HEIGHT: 65 IN | DIASTOLIC BLOOD PRESSURE: 74 MMHG | WEIGHT: 239 LBS | BODY MASS INDEX: 39.82 KG/M2

## 2019-07-11 DIAGNOSIS — N84.2 VAGINAL POLYP: Primary | ICD-10-CM

## 2019-07-11 PROCEDURE — 57421 EXAM/BIOPSY OF VAG W/SCOPE: CPT | Performed by: OBSTETRICS & GYNECOLOGY

## 2019-07-15 LAB — SURGICAL PATHOLOGY REPORT: NORMAL

## 2019-07-17 ENCOUNTER — TELEPHONE (OUTPATIENT)
Dept: BEHAVIORAL/MENTAL HEALTH CLINIC | Age: 54
End: 2019-07-17

## 2019-07-30 ENCOUNTER — HOSPITAL ENCOUNTER (EMERGENCY)
Age: 54
Discharge: HOME | End: 2019-07-30
Attending: EMERGENCY MEDICINE
Payer: COMMERCIAL

## 2019-07-30 ENCOUNTER — APPOINTMENT (OUTPATIENT)
Dept: GENERAL RADIOLOGY | Age: 54
End: 2019-07-30
Payer: COMMERCIAL

## 2019-07-30 VITALS
BODY MASS INDEX: 32.14 KG/M2 | DIASTOLIC BLOOD PRESSURE: 61 MMHG | TEMPERATURE: 97.6 F | WEIGHT: 200 LBS | HEIGHT: 66 IN | HEART RATE: 73 BPM | OXYGEN SATURATION: 95 % | SYSTOLIC BLOOD PRESSURE: 121 MMHG | RESPIRATION RATE: 16 BRPM

## 2019-07-30 DIAGNOSIS — S62.347A CLOSED NONDISPLACED FRACTURE OF BASE OF FIFTH METACARPAL BONE OF LEFT HAND, INITIAL ENCOUNTER: Primary | ICD-10-CM

## 2019-07-30 PROCEDURE — 73562 X-RAY EXAM OF KNEE 3: CPT

## 2019-07-30 PROCEDURE — 73130 X-RAY EXAM OF HAND: CPT

## 2019-07-30 PROCEDURE — 6370000000 HC RX 637 (ALT 250 FOR IP): Performed by: EMERGENCY MEDICINE

## 2019-07-30 PROCEDURE — 29125 APPL SHORT ARM SPLINT STATIC: CPT

## 2019-07-30 PROCEDURE — 99283 EMERGENCY DEPT VISIT LOW MDM: CPT

## 2019-07-30 RX ORDER — HYDROCODONE BITARTRATE AND ACETAMINOPHEN 5; 325 MG/1; MG/1
1 TABLET ORAL EVERY 6 HOURS PRN
Qty: 10 TABLET | Refills: 0 | Status: SHIPPED | OUTPATIENT
Start: 2019-07-30 | End: 2019-08-02

## 2019-07-30 RX ORDER — HYDROCODONE BITARTRATE AND ACETAMINOPHEN 5; 325 MG/1; MG/1
1 TABLET ORAL ONCE
Status: COMPLETED | OUTPATIENT
Start: 2019-07-30 | End: 2019-07-30

## 2019-07-30 RX ADMIN — HYDROCODONE BITARTRATE AND ACETAMINOPHEN 1 TABLET: 5; 325 TABLET ORAL at 09:13

## 2019-07-30 ASSESSMENT — ENCOUNTER SYMPTOMS
CHEST TIGHTNESS: 0
COLOR CHANGE: 0
NAUSEA: 0
EYE REDNESS: 0
SHORTNESS OF BREATH: 0
VOMITING: 0
SORE THROAT: 0
TROUBLE SWALLOWING: 0
FACIAL SWELLING: 0
SINUS PRESSURE: 0
ABDOMINAL PAIN: 0
RHINORRHEA: 0
WHEEZING: 0
EYE PAIN: 0
BLOOD IN STOOL: 0
BACK PAIN: 0
CONSTIPATION: 0
DIARRHEA: 0
EYE DISCHARGE: 0
COUGH: 0

## 2019-07-30 ASSESSMENT — PAIN DESCRIPTION - PAIN TYPE: TYPE: ACUTE PAIN

## 2019-07-30 ASSESSMENT — PAIN DESCRIPTION - INTENSITY: RATING_2: 3

## 2019-07-30 ASSESSMENT — PAIN DESCRIPTION - DESCRIPTORS
DESCRIPTORS: ACHING;THROBBING
DESCRIPTORS_2: ACHING

## 2019-07-30 ASSESSMENT — PAIN DESCRIPTION - LOCATION
LOCATION: HAND
LOCATION_2: KNEE

## 2019-07-30 ASSESSMENT — PAIN SCALES - GENERAL
PAINLEVEL_OUTOF10: 6
PAINLEVEL_OUTOF10: 6

## 2019-07-30 ASSESSMENT — PAIN DESCRIPTION - ORIENTATION
ORIENTATION: LEFT
ORIENTATION_2: LEFT

## 2019-07-30 NOTE — ED NOTES
Patient presents to ED from home with her family. Patient states she tripped outside this morning and fell onto her left knee and hand. She denies hitting her head and denies any LOC. She reports since the fall she has had pain in her left hand and knee.       Brennan Hobson RN  07/30/19 [id number]

## 2019-07-30 NOTE — LETTER
[facility] ED  [address] [address]  Phone: [phone]               July 30, 2019    Patient: Gordo Florentino   YOB: 1965   Date of Visit: 7/30/2019       To Whom It May Concern:    Gordo Florentino was seen and treated in our emergency department on 7/30/2019. She may return to work on 8/1/19.       Sincerely,       Thomas Zhu MD         Signature:__________________________________

## 2019-07-30 NOTE — ED PROVIDER NOTES
Take 1 tablet by mouth daily    THERAPEUTIC MULTIVITAMIN-MINERALS (THERAGRAN-M) TABLET    Take 1 tablet by mouth daily. ALLERGIES     is allergic to cephalexin; morphine; and percocet [oxycodone-acetaminophen]. SOCIAL HISTORY      reports that she has never smoked. She has never used smokeless tobacco. She reports that she drinks alcohol. She reports that she does not use drugs. PHYSICAL EXAM     INITIAL VITALS: BP (!) 142/75   Pulse 68   Temp 97.6 °F (36.4 °C) (Oral)   Resp 16   Ht 5' 6\" (1.676 m)   Wt 200 lb (90.7 kg)   LMP 10/06/2013   SpO2 97%   BMI 32.28 kg/m²      Physical Exam   Constitutional: She is oriented to person, place, and time. She appears well-developed and well-nourished. No distress. HENT:   Head: Normocephalic and atraumatic. Eyes: Pupils are equal, round, and reactive to light. Conjunctivae and EOM are normal. Right eye exhibits no discharge. Left eye exhibits no discharge. No scleral icterus. Cardiovascular: Normal rate, regular rhythm and normal heart sounds. Exam reveals no gallop and no friction rub. No murmur heard. Pulmonary/Chest: Effort normal and breath sounds normal. No respiratory distress. She has no wheezes. She has no rales. She exhibits no tenderness. Abdominal: Soft. Bowel sounds are normal. She exhibits no distension and no mass. There is no tenderness. There is no rebound and no guarding. Musculoskeletal: She exhibits no edema. Left knee: She exhibits swelling and bony tenderness. She exhibits normal range of motion, no effusion, no ecchymosis, no deformity, no laceration and no erythema. Left hand: She exhibits decreased range of motion, tenderness, bony tenderness and swelling. She exhibits normal two-point discrimination, normal capillary refill, no deformity and no laceration. Normal sensation noted. Normal strength noted. Neurological: She is alert and oriented to person, place, and time. She displays normal reflexes.  No

## 2019-08-01 ENCOUNTER — OFFICE VISIT (OUTPATIENT)
Dept: ORTHOPEDIC SURGERY | Age: 54
End: 2019-08-01
Payer: COMMERCIAL

## 2019-08-01 VITALS — BODY MASS INDEX: 32.14 KG/M2 | HEIGHT: 66 IN | WEIGHT: 199.96 LBS

## 2019-08-01 DIAGNOSIS — S62.347A NONDISPLACED FRACTURE OF BASE OF FIFTH METACARPAL BONE, LEFT HAND, INITIAL ENCOUNTER FOR CLOSED FRACTURE: Primary | ICD-10-CM

## 2019-08-01 PROCEDURE — 26600 TREAT METACARPAL FRACTURE: CPT | Performed by: PHYSICIAN ASSISTANT

## 2019-08-01 PROCEDURE — 99024 POSTOP FOLLOW-UP VISIT: CPT | Performed by: PHYSICIAN ASSISTANT

## 2019-08-01 ASSESSMENT — ENCOUNTER SYMPTOMS
VOMITING: 0
COUGH: 0
COLOR CHANGE: 0
NAUSEA: 0
RHINORRHEA: 0
EYES NEGATIVE: 1
SHORTNESS OF BREATH: 0

## 2019-08-01 NOTE — PROGRESS NOTES
Patient ID: Magdalena Campbell is a 62 y.o. female. Chief Complaint   Patient presents with    Wrist Injury     Left         HPI  Ms. Santo is a 61-year-old female who presents for left fifth metacarpal base fracture. On 7/30/2019 patient was cleaning her pool when she tripped and fell on outstretched hand. Patient also struck her knee. She subsequently seen in the ED where she had x-ray of the hand revealing a fracture and negative x-rays of the knee. She was placed in a ulnar gutter splint and recommended follow-up with us.     Past Medical History:   Diagnosis Date    Anxiety     Cystocele     Gastroesophageal reflux disease without esophagitis 10/24/2018    Hyperlipidemia     Thyroid disease     Urinary incontinence     Uterine fibroid     Vaginal prolapse      Past Surgical History:   Procedure Laterality Date    BLADDER SUSPENSION  2016    BREAST SURGERY Bilateral 02/2013    breast reduction    CHOLECYSTECTOMY  2007    COLONOSCOPY  7/25/14    normal    CYSTOSCOPY  7/13/15    Robotic cystocele repair, JORGE, lynx sling, sacrocolpopexy    ENDOMETRIAL ABLATION      with bladder sling TVT    HERNIA REPAIR  1652    umbilical    HYSTERECTOMY, VAGINAL  12/10/2013    [address] and posterior repair    DC EGD TRANSORAL BIOPSY SINGLE/MULTIPLE N/A 9/15/2017    EGD BIOPSY performed by Salomon Longoria DO at [address] OR     Family History   Problem Relation Age of Onset    High Cholesterol Mother     High Blood Pressure Father     Heart Disease Father         stent    Cancer Father         bladder    Breast Cancer Maternal Aunt 79    Heart Disease Maternal Grandfather     Arthritis Maternal Grandfather     Cancer Maternal Grandfather         bone CA    Other Paternal Grandmother         Leukemia    Other Paternal Grandfather         Leukemia    Heart Disease Paternal Grandfather     No Known Problems Sister     No Known Problems Brother        Review of Systems   Constitutional: Negative for chills and fever. HENT: Negative for congestion and rhinorrhea. Eyes: Negative. Respiratory: Negative for cough and shortness of breath. Cardiovascular: Negative for chest pain and leg swelling. Gastrointestinal: Negative for nausea and vomiting. Musculoskeletal:        Left fifth metacarpal fracture   Skin: Negative for color change and rash. Neurological: Negative for dizziness and numbness. Physical Exam   Constitutional: She is oriented to person, place, and time. She appears well-developed and well-nourished. HENT:   Head: Normocephalic and atraumatic. Eyes: EOM are normal.   Neck: Normal range of motion. Neck supple. Cardiovascular: Normal rate. Pulmonary/Chest: Effort normal.   Abdominal: Soft. Musculoskeletal:   Left hand:  She is slight tenderness over the base of the fifth metacarpal.  Patient also has 2 abrasions to the proximal and middle phalanx of the fifth digit. Mild ecchymosis over the fracture site with minimal swelling. No other deformity and no other tenderness. Neurological: She is alert and oriented to person, place, and time. Skin: Skin is warm and dry. No rash noted. Psychiatric: She has a normal mood and affect. Her behavior is normal.       Assessment:     Encounter Diagnosis   Name Primary?  Nondisplaced fracture of base of fifth metacarpal bone, left hand, initial encounter for closed fracture Yes         Previous x-rays:  3 views left hand:  FINDINGS:   There is a nondisplaced fracture at the base of the 5th metacarpal.  The   remaining osseous structures are intact.  The joint spaces are maintained. The surrounding soft tissues are unremarkable         Plan:     Patient is placed in short arm cast today    Follow-up in 4 weeks for repeat x-rays out of cast    No orders of the defined types were placed in this encounter.        DinorahDorothea Dix Psych[facility] Center[facility]cepcion [place]    Please note that this chart was generated using voicerecognition Dragon

## 2019-08-09 ENCOUNTER — TELEPHONE (OUTPATIENT)
Dept: ORTHOPEDIC SURGERY | Age: 54
End: 2019-08-09

## 2019-08-09 RX ORDER — CELECOXIB 100 MG/1
100 CAPSULE ORAL 2 TIMES DAILY
Qty: 180 CAPSULE | Refills: 3 | Status: SHIPPED | OUTPATIENT
Start: 2019-08-09 | End: 2019-12-06 | Stop reason: DRUGHIGH

## 2019-08-14 ENCOUNTER — OFFICE VISIT (OUTPATIENT)
Dept: OBGYN CLINIC | Age: 54
End: 2019-08-14
Payer: COMMERCIAL

## 2019-08-14 VITALS
DIASTOLIC BLOOD PRESSURE: 68 MMHG | BODY MASS INDEX: 40.19 KG/M2 | WEIGHT: 241.2 LBS | HEIGHT: 65 IN | SYSTOLIC BLOOD PRESSURE: 122 MMHG

## 2019-08-14 DIAGNOSIS — N93.9 VAGINAL SPOTTING: Primary | ICD-10-CM

## 2019-08-14 PROCEDURE — 99213 OFFICE O/P EST LOW 20 MIN: CPT | Performed by: OBSTETRICS & GYNECOLOGY

## 2019-08-28 DIAGNOSIS — S62.347A NONDISPLACED FRACTURE OF BASE OF FIFTH METACARPAL BONE, LEFT HAND, INITIAL ENCOUNTER FOR CLOSED FRACTURE: Primary | ICD-10-CM

## 2019-08-29 ENCOUNTER — OFFICE VISIT (OUTPATIENT)
Dept: ORTHOPEDIC SURGERY | Age: 54
End: 2019-08-29

## 2019-08-29 VITALS — WEIGHT: 200 LBS | HEIGHT: 66 IN | BODY MASS INDEX: 32.14 KG/M2

## 2019-08-29 DIAGNOSIS — S62.347D CLOSED NONDISPLACED FRACTURE OF BASE OF FIFTH METACARPAL BONE OF LEFT HAND WITH ROUTINE HEALING, SUBSEQUENT ENCOUNTER: Primary | ICD-10-CM

## 2019-08-29 PROCEDURE — 99024 POSTOP FOLLOW-UP VISIT: CPT | Performed by: PHYSICIAN ASSISTANT

## 2019-08-29 NOTE — LETTER
[facility]  [address] [place] [id number]  Phone: [phone]  Fax: [phone]    Vaishali Dumont, [address]        August 29, 2019     Patient: Diana Vann   YOB: 1965   Date of Visit: 8/29/2019       To Whom It May Concern: It is my medical opinion that Diana Vann may return to work on 9/3/19 with no restrictions. If you have any questions or concerns, please don't hesitate to call.     Sincerely,          JANET Durbin

## 2019-08-30 ASSESSMENT — ENCOUNTER SYMPTOMS
NAUSEA: 0
COLOR CHANGE: 0
VOMITING: 0
RHINORRHEA: 0
EYES NEGATIVE: 1
COUGH: 0

## 2019-10-17 DIAGNOSIS — Z80.3 FHX: BREAST CANCER: ICD-10-CM

## 2019-10-17 DIAGNOSIS — Z12.31 ENCOUNTER FOR SCREENING MAMMOGRAM FOR BREAST CANCER: Primary | ICD-10-CM

## 2019-10-24 ENCOUNTER — OFFICE VISIT (OUTPATIENT)
Dept: OBGYN CLINIC | Age: 54
End: 2019-10-24
Payer: COMMERCIAL

## 2019-10-24 VITALS
DIASTOLIC BLOOD PRESSURE: 70 MMHG | HEIGHT: 66 IN | SYSTOLIC BLOOD PRESSURE: 122 MMHG | WEIGHT: 238.4 LBS | BODY MASS INDEX: 38.31 KG/M2

## 2019-10-24 DIAGNOSIS — N93.9 VAGINAL SPOTTING: Primary | ICD-10-CM

## 2019-10-24 DIAGNOSIS — N99.820 POSTOPERATIVE VAGINAL BLEEDING FOLLOWING GENITOURINARY PROCEDURE: ICD-10-CM

## 2019-10-24 PROCEDURE — 99213 OFFICE O/P EST LOW 20 MIN: CPT | Performed by: OBSTETRICS & GYNECOLOGY

## 2019-11-07 ENCOUNTER — TELEPHONE (OUTPATIENT)
Dept: OBGYN CLINIC | Age: 54
End: 2019-11-07

## 2019-11-08 ENCOUNTER — ANESTHESIA (OUTPATIENT)
Dept: OPERATING ROOM | Age: 54
End: 2019-11-08
Payer: COMMERCIAL

## 2019-11-08 ENCOUNTER — ANESTHESIA EVENT (OUTPATIENT)
Dept: OPERATING ROOM | Age: 54
End: 2019-11-08
Payer: COMMERCIAL

## 2019-11-08 ENCOUNTER — HOSPITAL ENCOUNTER (OUTPATIENT)
Age: 54
Setting detail: OUTPATIENT SURGERY
Discharge: HOME | End: 2019-11-08
Attending: OBSTETRICS & GYNECOLOGY | Admitting: OBSTETRICS & GYNECOLOGY
Payer: COMMERCIAL

## 2019-11-08 VITALS — SYSTOLIC BLOOD PRESSURE: 115 MMHG | DIASTOLIC BLOOD PRESSURE: 49 MMHG | OXYGEN SATURATION: 99 % | TEMPERATURE: 95.6 F

## 2019-11-08 VITALS
BODY MASS INDEX: 36.96 KG/M2 | HEIGHT: 66 IN | OXYGEN SATURATION: 100 % | HEART RATE: 57 BPM | TEMPERATURE: 97.7 F | DIASTOLIC BLOOD PRESSURE: 58 MMHG | WEIGHT: 230 LBS | SYSTOLIC BLOOD PRESSURE: 117 MMHG | RESPIRATION RATE: 14 BRPM

## 2019-11-08 PROBLEM — N84.2 VAGINAL POLYP: Status: ACTIVE | Noted: 2019-11-08

## 2019-11-08 LAB
ABO/RH: NORMAL
ANTIBODY SCREEN: NEGATIVE
ARM BAND NUMBER: NORMAL
EXPIRATION DATE: NORMAL
HCT VFR BLD CALC: 43.4 % (ref 36.3–47.1)
HEMOGLOBIN: 14 G/DL (ref 11.9–15.1)
MCH RBC QN AUTO: 28.2 PG (ref 25.2–33.5)
MCHC RBC AUTO-ENTMCNC: 32.3 G/DL (ref 28.4–34.8)
MCV RBC AUTO: 87.5 FL (ref 82.6–102.9)
NRBC AUTOMATED: 0 PER 100 WBC
PDW BLD-RTO: 13.5 % (ref 11.8–14.4)
PLATELET # BLD: 318 K/UL (ref 138–453)
PMV BLD AUTO: 9.6 FL (ref 8.1–13.5)
RBC # BLD: 4.96 M/UL (ref 3.95–5.11)
WBC # BLD: 6.6 K/UL (ref 3.5–11.3)

## 2019-11-08 PROCEDURE — 3700000000 HC ANESTHESIA ATTENDED CARE: Performed by: OBSTETRICS & GYNECOLOGY

## 2019-11-08 PROCEDURE — 3600000004 HC SURGERY LEVEL 4 BASE: Performed by: OBSTETRICS & GYNECOLOGY

## 2019-11-08 PROCEDURE — 6360000002 HC RX W HCPCS: Performed by: SPECIALIST

## 2019-11-08 PROCEDURE — 86900 BLOOD TYPING SEROLOGIC ABO: CPT

## 2019-11-08 PROCEDURE — 58999 UNLISTED PX FML GENITAL SYS: CPT | Performed by: OBSTETRICS & GYNECOLOGY

## 2019-11-08 PROCEDURE — 7100000010 HC PHASE II RECOVERY - FIRST 15 MIN: Performed by: OBSTETRICS & GYNECOLOGY

## 2019-11-08 PROCEDURE — 2500000003 HC RX 250 WO HCPCS: Performed by: SPECIALIST

## 2019-11-08 PROCEDURE — 7100000011 HC PHASE II RECOVERY - ADDTL 15 MIN: Performed by: OBSTETRICS & GYNECOLOGY

## 2019-11-08 PROCEDURE — 6360000002 HC RX W HCPCS: Performed by: ANESTHESIOLOGY

## 2019-11-08 PROCEDURE — 85027 COMPLETE CBC AUTOMATED: CPT

## 2019-11-08 PROCEDURE — 2709999900 HC NON-CHARGEABLE SUPPLY: Performed by: OBSTETRICS & GYNECOLOGY

## 2019-11-08 PROCEDURE — 86850 RBC ANTIBODY SCREEN: CPT

## 2019-11-08 PROCEDURE — 93005 ELECTROCARDIOGRAM TRACING: CPT | Performed by: ANESTHESIOLOGY

## 2019-11-08 PROCEDURE — 86901 BLOOD TYPING SEROLOGIC RH(D): CPT

## 2019-11-08 PROCEDURE — 3600000014 HC SURGERY LEVEL 4 ADDTL 15MIN: Performed by: OBSTETRICS & GYNECOLOGY

## 2019-11-08 PROCEDURE — 7100000001 HC PACU RECOVERY - ADDTL 15 MIN: Performed by: OBSTETRICS & GYNECOLOGY

## 2019-11-08 PROCEDURE — 7100000000 HC PACU RECOVERY - FIRST 15 MIN: Performed by: OBSTETRICS & GYNECOLOGY

## 2019-11-08 PROCEDURE — 3700000001 HC ADD 15 MINUTES (ANESTHESIA): Performed by: OBSTETRICS & GYNECOLOGY

## 2019-11-08 PROCEDURE — 6370000000 HC RX 637 (ALT 250 FOR IP): Performed by: ANESTHESIOLOGY

## 2019-11-08 PROCEDURE — 2580000003 HC RX 258: Performed by: ANESTHESIOLOGY

## 2019-11-08 RX ORDER — HYDROCODONE BITARTRATE AND ACETAMINOPHEN 5; 325 MG/1; MG/1
2 TABLET ORAL PRN
Status: DISCONTINUED | OUTPATIENT
Start: 2019-11-08 | End: 2019-11-08 | Stop reason: HOSPADM

## 2019-11-08 RX ORDER — SODIUM CHLORIDE, SODIUM LACTATE, POTASSIUM CHLORIDE, CALCIUM CHLORIDE 600; 310; 30; 20 MG/100ML; MG/100ML; MG/100ML; MG/100ML
INJECTION, SOLUTION INTRAVENOUS CONTINUOUS
Status: DISCONTINUED | OUTPATIENT
Start: 2019-11-08 | End: 2019-11-08 | Stop reason: HOSPADM

## 2019-11-08 RX ORDER — HYDROCODONE BITARTRATE AND ACETAMINOPHEN 5; 325 MG/1; MG/1
1 TABLET ORAL PRN
Status: DISCONTINUED | OUTPATIENT
Start: 2019-11-08 | End: 2019-11-08 | Stop reason: HOSPADM

## 2019-11-08 RX ORDER — LIDOCAINE HYDROCHLORIDE 10 MG/ML
INJECTION, SOLUTION EPIDURAL; INFILTRATION; INTRACAUDAL; PERINEURAL PRN
Status: DISCONTINUED | OUTPATIENT
Start: 2019-11-08 | End: 2019-11-08 | Stop reason: SDUPTHER

## 2019-11-08 RX ORDER — MIDAZOLAM HYDROCHLORIDE 1 MG/ML
2 INJECTION INTRAMUSCULAR; INTRAVENOUS ONCE
Status: COMPLETED | OUTPATIENT
Start: 2019-11-08 | End: 2019-11-08

## 2019-11-08 RX ORDER — FENTANYL CITRATE 50 UG/ML
25 INJECTION, SOLUTION INTRAMUSCULAR; INTRAVENOUS EVERY 5 MIN PRN
Status: DISCONTINUED | OUTPATIENT
Start: 2019-11-08 | End: 2019-11-08 | Stop reason: HOSPADM

## 2019-11-08 RX ORDER — ONDANSETRON 2 MG/ML
INJECTION INTRAMUSCULAR; INTRAVENOUS PRN
Status: DISCONTINUED | OUTPATIENT
Start: 2019-11-08 | End: 2019-11-08 | Stop reason: SDUPTHER

## 2019-11-08 RX ORDER — FENTANYL CITRATE 50 UG/ML
INJECTION, SOLUTION INTRAMUSCULAR; INTRAVENOUS PRN
Status: DISCONTINUED | OUTPATIENT
Start: 2019-11-08 | End: 2019-11-08 | Stop reason: SDUPTHER

## 2019-11-08 RX ORDER — FENTANYL CITRATE 50 UG/ML
50 INJECTION, SOLUTION INTRAMUSCULAR; INTRAVENOUS EVERY 5 MIN PRN
Status: DISCONTINUED | OUTPATIENT
Start: 2019-11-08 | End: 2019-11-08 | Stop reason: HOSPADM

## 2019-11-08 RX ORDER — IBUPROFEN 800 MG/1
800 TABLET ORAL
Status: COMPLETED | OUTPATIENT
Start: 2019-11-08 | End: 2019-11-08

## 2019-11-08 RX ORDER — DEXAMETHASONE SODIUM PHOSPHATE 10 MG/ML
INJECTION INTRAMUSCULAR; INTRAVENOUS PRN
Status: DISCONTINUED | OUTPATIENT
Start: 2019-11-08 | End: 2019-11-08 | Stop reason: SDUPTHER

## 2019-11-08 RX ORDER — IBUPROFEN 800 MG/1
800 TABLET ORAL EVERY 8 HOURS PRN
Qty: 30 TABLET | Refills: 3 | Status: SHIPPED | OUTPATIENT
Start: 2019-11-08 | End: 2020-07-13

## 2019-11-08 RX ORDER — PROPOFOL 10 MG/ML
INJECTION, EMULSION INTRAVENOUS PRN
Status: DISCONTINUED | OUTPATIENT
Start: 2019-11-08 | End: 2019-11-08 | Stop reason: SDUPTHER

## 2019-11-08 RX ADMIN — FENTANYL CITRATE 50 MCG: 50 INJECTION INTRAMUSCULAR; INTRAVENOUS at 13:30

## 2019-11-08 RX ADMIN — DEXAMETHASONE SODIUM PHOSPHATE 10 MG: 10 INJECTION INTRAMUSCULAR; INTRAVENOUS at 13:25

## 2019-11-08 RX ADMIN — LIDOCAINE HYDROCHLORIDE 50 MG: 10 INJECTION, SOLUTION EPIDURAL; INFILTRATION; INTRACAUDAL; PERINEURAL at 13:22

## 2019-11-08 RX ADMIN — SODIUM CHLORIDE, POTASSIUM CHLORIDE, SODIUM LACTATE AND CALCIUM CHLORIDE: 600; 310; 30; 20 INJECTION, SOLUTION INTRAVENOUS at 12:33

## 2019-11-08 RX ADMIN — PROPOFOL 50 MG: 10 INJECTION, EMULSION INTRAVENOUS at 13:31

## 2019-11-08 RX ADMIN — IBUPROFEN 800 MG: 800 TABLET, FILM COATED ORAL at 15:07

## 2019-11-08 RX ADMIN — FENTANYL CITRATE 50 MCG: 50 INJECTION INTRAMUSCULAR; INTRAVENOUS at 13:22

## 2019-11-08 RX ADMIN — ONDANSETRON 4 MG: 2 INJECTION, SOLUTION INTRAMUSCULAR; INTRAVENOUS at 14:07

## 2019-11-08 RX ADMIN — PROPOFOL 200 MG: 10 INJECTION, EMULSION INTRAVENOUS at 13:22

## 2019-11-08 RX ADMIN — MIDAZOLAM HYDROCHLORIDE 2 MG: 1 INJECTION, SOLUTION INTRAMUSCULAR; INTRAVENOUS at 12:58

## 2019-11-08 ASSESSMENT — PULMONARY FUNCTION TESTS
PIF_VALUE: 4
PIF_VALUE: 1
PIF_VALUE: 6
PIF_VALUE: 2
PIF_VALUE: 5
PIF_VALUE: 10
PIF_VALUE: 0
PIF_VALUE: 6
PIF_VALUE: 2
PIF_VALUE: 6
PIF_VALUE: 2
PIF_VALUE: 4
PIF_VALUE: 6
PIF_VALUE: 5
PIF_VALUE: 6
PIF_VALUE: 11
PIF_VALUE: 6
PIF_VALUE: 1
PIF_VALUE: 6
PIF_VALUE: 6
PIF_VALUE: 1
PIF_VALUE: 1
PIF_VALUE: 6
PIF_VALUE: 6
PIF_VALUE: 17
PIF_VALUE: 13
PIF_VALUE: 6
PIF_VALUE: 7
PIF_VALUE: 7
PIF_VALUE: 10
PIF_VALUE: 9
PIF_VALUE: 0
PIF_VALUE: 10
PIF_VALUE: 16
PIF_VALUE: 10
PIF_VALUE: 7
PIF_VALUE: 8
PIF_VALUE: 2
PIF_VALUE: 6
PIF_VALUE: 6
PIF_VALUE: 2
PIF_VALUE: 21
PIF_VALUE: 6
PIF_VALUE: 0
PIF_VALUE: 6
PIF_VALUE: 6
PIF_VALUE: 7
PIF_VALUE: 6
PIF_VALUE: 6
PIF_VALUE: 11
PIF_VALUE: 6
PIF_VALUE: 7
PIF_VALUE: 6
PIF_VALUE: 11
PIF_VALUE: 11
PIF_VALUE: 6
PIF_VALUE: 7
PIF_VALUE: 21
PIF_VALUE: 6

## 2019-11-08 ASSESSMENT — PAIN - FUNCTIONAL ASSESSMENT: PAIN_FUNCTIONAL_ASSESSMENT: 0-10

## 2019-11-08 ASSESSMENT — PAIN SCALES - GENERAL
PAINLEVEL_OUTOF10: 1
PAINLEVEL_OUTOF10: 0

## 2019-11-09 LAB
EKG ATRIAL RATE: 65 BPM
EKG P AXIS: 2 DEGREES
EKG P-R INTERVAL: 174 MS
EKG Q-T INTERVAL: 440 MS
EKG QRS DURATION: 116 MS
EKG QTC CALCULATION (BAZETT): 457 MS
EKG R AXIS: -37 DEGREES
EKG T AXIS: -14 DEGREES
EKG VENTRICULAR RATE: 65 BPM

## 2019-11-25 ENCOUNTER — OFFICE VISIT (OUTPATIENT)
Dept: OBGYN CLINIC | Age: 54
End: 2019-11-25
Payer: COMMERCIAL

## 2019-11-25 VITALS
WEIGHT: 234 LBS | SYSTOLIC BLOOD PRESSURE: 132 MMHG | HEIGHT: 66 IN | DIASTOLIC BLOOD PRESSURE: 84 MMHG | BODY MASS INDEX: 37.61 KG/M2

## 2019-11-25 DIAGNOSIS — Z09 POSTOP CHECK: Primary | ICD-10-CM

## 2019-11-25 PROCEDURE — 99213 OFFICE O/P EST LOW 20 MIN: CPT | Performed by: OBSTETRICS & GYNECOLOGY

## 2019-12-06 ENCOUNTER — OFFICE VISIT (OUTPATIENT)
Dept: FAMILY MEDICINE CLINIC | Age: 54
End: 2019-12-06
Payer: COMMERCIAL

## 2019-12-06 VITALS
OXYGEN SATURATION: 95 % | WEIGHT: 231 LBS | RESPIRATION RATE: 16 BRPM | SYSTOLIC BLOOD PRESSURE: 136 MMHG | TEMPERATURE: 97.8 F | HEART RATE: 74 BPM | DIASTOLIC BLOOD PRESSURE: 86 MMHG | BODY MASS INDEX: 37.28 KG/M2

## 2019-12-06 DIAGNOSIS — J01.90 ACUTE SINUSITIS, RECURRENCE NOT SPECIFIED, UNSPECIFIED LOCATION: ICD-10-CM

## 2019-12-06 DIAGNOSIS — F41.9 ANXIETY: ICD-10-CM

## 2019-12-06 DIAGNOSIS — K21.9 GASTROESOPHAGEAL REFLUX DISEASE WITHOUT ESOPHAGITIS: ICD-10-CM

## 2019-12-06 DIAGNOSIS — N93.9 VAGINA BLEEDING: ICD-10-CM

## 2019-12-06 DIAGNOSIS — E78.2 MIXED HYPERLIPIDEMIA: Primary | ICD-10-CM

## 2019-12-06 DIAGNOSIS — R73.9 HYPERGLYCEMIA: ICD-10-CM

## 2019-12-06 DIAGNOSIS — E03.9 HYPOTHYROIDISM, UNSPECIFIED TYPE: ICD-10-CM

## 2019-12-06 PROCEDURE — 99214 OFFICE O/P EST MOD 30 MIN: CPT | Performed by: FAMILY MEDICINE

## 2019-12-06 RX ORDER — CELECOXIB 200 MG/1
200 CAPSULE ORAL DAILY
COMMUNITY
End: 2019-12-06 | Stop reason: SDUPTHER

## 2019-12-06 RX ORDER — CELECOXIB 200 MG/1
200 CAPSULE ORAL DAILY
Qty: 30 CAPSULE | Refills: 11 | Status: SHIPPED
Start: 2019-12-06 | End: 2020-08-03 | Stop reason: ALTCHOICE

## 2019-12-06 RX ORDER — AZITHROMYCIN 250 MG/1
TABLET, FILM COATED ORAL
Qty: 1 PACKET | Refills: 0 | Status: SHIPPED | OUTPATIENT
Start: 2019-12-06 | End: 2019-12-16

## 2019-12-06 RX ORDER — FLUTICASONE PROPIONATE 50 MCG
1 SPRAY, SUSPENSION (ML) NASAL DAILY
Qty: 1 BOTTLE | Refills: 3 | Status: SHIPPED | OUTPATIENT
Start: 2019-12-06 | End: 2020-06-15

## 2019-12-06 ASSESSMENT — ENCOUNTER SYMPTOMS
NAUSEA: 0
COUGH: 0
DIARRHEA: 0
ABDOMINAL PAIN: 0
RHINORRHEA: 0
CONSTIPATION: 0
SORE THROAT: 1
BACK PAIN: 0
VOMITING: 0
SINUS PRESSURE: 1
ABDOMINAL DISTENTION: 0
CHEST TIGHTNESS: 0
SHORTNESS OF BREATH: 0

## 2019-12-18 ENCOUNTER — HOSPITAL ENCOUNTER (OUTPATIENT)
Dept: WOMENS IMAGING | Age: 54
Discharge: HOME | End: 2019-12-20
Payer: COMMERCIAL

## 2019-12-18 DIAGNOSIS — Z12.31 ENCOUNTER FOR SCREENING MAMMOGRAM FOR BREAST CANCER: ICD-10-CM

## 2019-12-18 DIAGNOSIS — Z80.3 FHX: BREAST CANCER: ICD-10-CM

## 2019-12-18 PROCEDURE — 77063 BREAST TOMOSYNTHESIS BI: CPT

## 2020-05-04 ENCOUNTER — TELEPHONE (OUTPATIENT)
Dept: ADMINISTRATIVE | Age: 55
End: 2020-05-04

## 2020-05-04 NOTE — TELEPHONE ENCOUNTER
The only documentation in patients record since last year was fracture of her left hand. She saw Dr. Spicer Older for that.

## 2020-05-07 ENCOUNTER — TELEMEDICINE (OUTPATIENT)
Dept: OBGYN CLINIC | Age: 55
End: 2020-05-07
Payer: COMMERCIAL

## 2020-05-07 VITALS — WEIGHT: 220 LBS | BODY MASS INDEX: 35.36 KG/M2 | HEIGHT: 66 IN

## 2020-05-07 PROBLEM — R89.9 ABNORMAL LABORATORY TEST: Status: ACTIVE | Noted: 2020-05-07

## 2020-05-07 PROBLEM — N89.8 GRANULATION TISSUE OF VAGINAL CUFF: Status: ACTIVE | Noted: 2020-05-07

## 2020-05-07 PROBLEM — Z90.710 HISTORY OF TOTAL HYSTERECTOMY: Status: ACTIVE | Noted: 2020-05-07

## 2020-05-07 PROCEDURE — 99203 OFFICE O/P NEW LOW 30 MIN: CPT | Performed by: OBSTETRICS & GYNECOLOGY

## 2020-05-07 NOTE — PROGRESS NOTES
Brayan Tiffanie Levychroder  2020    Vesna Llamas (:  1965) has requested an audio/video evaluation for the following concern(s):    1. PCB (post coital bleeding)    2. History of total hysterectomy with BSO.-Robotic     3. HX of Abdominal Sacral Colpopext-Robotically     4. History of pubovaginal sling-    5. Granulation tissue of vaginal cuff-hx          TELEHEALTH EVALUATION -- Audio/Visual (During MIFBC-56 public health emergency)      Vesna Llamas is a 60 y.o. female [id number]      She was here to follow-up regarding her labs and diagnostics ordered at her last visit for the diagnosis of:    ICD-10-CM    1. PCB (post coital bleeding) N93.0    2. History of total hysterectomy with BSO.-Robotic  Z90.710    3. HX of Abdominal Sacral Colpopext-Robotically  R89.9    4. History of pubovaginal sling- Z96.0 Urinalysis Reflex to Culture   5. Granulation tissue of vaginal cuff-hx N89.8        Her bowels are regular and she is voiding without difficulty. The pt states the above symptoms began 1 yr ago. She saw Dr. Sabiha Kiser and he removed a stitch in the office and polyp in the vagina. Pt states symptoms persisted . She had an outpt procedure unsure what it was. Still with symptoms. She denies Blood in urine. She denies dyspareunia. No discharge. Past Medical History:   Diagnosis Date    Anxiety     Cystocele     Gastroesophageal reflux disease without esophagitis 10/24/2018    Hyperlipidemia     Sleep apnea     USES CPAP MACHINE.     Thyroid disease     Urinary incontinence     Uterine fibroid     Vaginal prolapse     Vaginal spotting     Wears glasses     READING         Past Surgical History:   Procedure Laterality Date    BLADDER SUSPENSION  2016    BREAST SURGERY Bilateral 2013    breast reduction    CERVICAL CERCLAGE N/A 2019    SUTURE REPAIR CERVICAL CUP performed by Jerome Mc MD at [address]      COLONOSCOPY 7/25/14    normal    CYSTOSCOPY  7/13/15    Robotic cystocele repair, JORGE, lynx sling, sacrocolpopexy    ENDOMETRIAL ABLATION  2009 ? with bladder sling TVT    ENDOSCOPY, COLON, DIAGNOSTIC  09/15/2017    EGD    HERNIA REPAIR  6621    umbilical    HYSTERECTOMY  12/10/2013    VAGINAL, OVARIES ALSO REMOVED    HYSTERECTOMY, VAGINAL  12/10/2013    RAVH and posterior repair    OTHER SURGICAL HISTORY  11/08/2019    SUTURE REPAIR CERVICAL CUP     MD EGD TRANSORAL BIOPSY SINGLE/MULTIPLE N/A 9/15/2017    EGD BIOPSY performed by Casey Bobby DO at [facility] OR         Family History   Problem Relation Age of Onset    High Cholesterol Mother     High Blood Pressure Father     Heart Disease Father         stent    Cancer Father         bladder    Breast Cancer Maternal Aunt 79    Heart Disease Maternal Grandfather     Arthritis Maternal Grandfather     Cancer Maternal Grandfather         bone CA    Other Paternal Grandmother         Leukemia    Other Paternal Grandfather         Leukemia    Heart Disease Paternal Grandfather     No Known Problems Sister     No Known Problems Brother          Social History     Tobacco Use    Smoking status: Never Smoker    Smokeless tobacco: Never Used   Substance Use Topics    Alcohol use:  Yes     Alcohol/week: 1.0 standard drinks     Types: 1 Glasses of wine per week     Comment: 1 time per month    Drug use: No         MEDICATIONS:  Current Outpatient Medications   Medication Sig Dispense Refill    celecoxib (CELEBREX) 200 MG capsule Take 1 capsule by mouth daily 30 capsule 11    fluticasone (FLONASE) 50 MCG/ACT nasal spray 1 spray by Each Nostril route daily 1 Bottle 3    ibuprofen (ADVIL;MOTRIN) 800 MG tablet Take 1 tablet by mouth every 8 hours as needed for Pain 30 tablet 3    diphenhydrAMINE HCl (BENADRYL ALLERGY PO) Take 1 tablet by mouth as needed      Hormone Cream Base CREA by Does not apply route daily       levothyroxine (SYNTHROID) 112 MCG tablet Procedures    Urinalysis Reflex to Culture     Standing Status:   Future     Standing Expiration Date:   7/7/2020     Order Specific Question:   SPECIFY(EX-CATH,MIDSTREAM,CYSTO,ETC)? Answer:   MID Claudeen Hedges is a 60 y.o. female female was evaluated by a Virtual Visit (video visit) encounter to address concerns as mentioned above. A caregiver was present when appropriate. Due to this being a TeleHealth encounter (During [facility] public health emergency), evaluation of the following organ systems was limited: Vitals/Constitutional/EENT/Resp/CV/GI//MS/Neuro/Skin/Heme-Lymph-Imm. Pursuant to the emergency declaration under the [address] [place] [address] authority and the [employer] Act, this Virtual Visit was conducted with patient's (and/or legal guardian's) consent, to reduce the patient's risk of exposure to COVID-19 and provide necessary medical care. The patient (and/or legal guardian) has also been advised to contact this office for worsening conditions or problems, and seek emergency medical treatment and/or call 911 if deemed necessary. Services were provided through a video synchronous discussion virtually to substitute for in-person clinic visit. Patient and provider were located at their individual homes. Electronically signed by Sandra Navarro DO on 5/7/20 at 9:50 AM EDT     An electronic signature was used to authenticate this note. The Virtual Visit time of 30 minutes. More than 50% of this visit was counseling and education regarding The primary encounter diagnosis was PCB (post coital bleeding). Diagnoses of History of total hysterectomy with BSO.-Robotic 2014, HX of Abdominal Sacral Colpopext-Robotically 2017, History of pubovaginal sling-2016, and Granulation tissue of vaginal cuff-hx were also pertinent to this visit.  and New Patient   as well as  counseling

## 2020-05-12 ENCOUNTER — HOSPITAL ENCOUNTER (OUTPATIENT)
Age: 55
Setting detail: SPECIMEN
Discharge: HOME | End: 2020-05-12
Payer: COMMERCIAL

## 2020-05-12 LAB
-: ABNORMAL
AMORPHOUS: ABNORMAL
BACTERIA: ABNORMAL
BILIRUBIN URINE: NEGATIVE
CASTS UA: ABNORMAL /LPF
COLOR: YELLOW
COMMENT UA: ABNORMAL
CRYSTALS, UA: ABNORMAL /HPF
EPITHELIAL CELLS UA: ABNORMAL /HPF
GLUCOSE URINE: NEGATIVE
KETONES, URINE: NEGATIVE
LEUKOCYTE ESTERASE, URINE: ABNORMAL
MUCUS: ABNORMAL
NITRITE, URINE: NEGATIVE
OTHER OBSERVATIONS UA: ABNORMAL
PH UA: 6 (ref 5–8)
PROTEIN UA: NEGATIVE
RBC UA: ABNORMAL /HPF
RENAL EPITHELIAL, UA: ABNORMAL /HPF
SPECIFIC GRAVITY UA: 1.02 (ref 1–1.03)
TRICHOMONAS: ABNORMAL
TURBIDITY: ABNORMAL
URINE HGB: ABNORMAL
UROBILINOGEN, URINE: NORMAL
WBC UA: ABNORMAL /HPF
YEAST: ABNORMAL

## 2020-05-12 PROCEDURE — 87086 URINE CULTURE/COLONY COUNT: CPT

## 2020-05-12 PROCEDURE — 81001 URINALYSIS AUTO W/SCOPE: CPT

## 2020-05-13 ENCOUNTER — OFFICE VISIT (OUTPATIENT)
Dept: OBGYN CLINIC | Age: 55
End: 2020-05-13
Payer: COMMERCIAL

## 2020-05-13 VITALS
WEIGHT: 230 LBS | DIASTOLIC BLOOD PRESSURE: 68 MMHG | TEMPERATURE: 98 F | SYSTOLIC BLOOD PRESSURE: 117 MMHG | BODY MASS INDEX: 36.96 KG/M2 | HEIGHT: 66 IN | HEART RATE: 67 BPM

## 2020-05-13 LAB
CULTURE: NORMAL
Lab: NORMAL
SPECIMEN DESCRIPTION: NORMAL

## 2020-05-13 PROCEDURE — 17250 CHEM CAUT OF GRANLTJ TISSUE: CPT | Performed by: OBSTETRICS & GYNECOLOGY

## 2020-05-13 NOTE — PROGRESS NOTES
[facility]  [facility]      Mo Hemphill is a 60 y.o. female       The patient was seen today. She was here to follow-up regarding her  diagnosis of:    ICD-10-CM    1. PCB (post coital bleeding) N93.0    2. History of total hysterectomy with BSO.-Robotic  Z90.710    3. HX of Abdominal Sacral Colpopext-Robotically  R89.9    4. History of pubovaginal sling-2016 Z96.0    5. Granulation tissue of vaginal cuff-hx N89.8 [place] CHEMICAL CAUTERIZATION OF GRANULATION TISSUE        Her bowels are regular and she is voiding without difficulty. Past Medical History:   Diagnosis Date    Anxiety     Cystocele     Gastroesophageal reflux disease without esophagitis 10/24/2018    Hyperlipidemia     Sleep apnea     USES CPAP MACHINE.  Thyroid disease     Urinary incontinence     Uterine fibroid     Vaginal prolapse     Vaginal spotting     Wears glasses     READING         Past Surgical History:   Procedure Laterality Date    BLADDER SUSPENSION      BREAST SURGERY Bilateral 2013    breast reduction    CERVICAL CERCLAGE N/A 2019    SUTURE REPAIR CERVICAL CUP performed by Clemente Ross MD at [address]      COLONOSCOPY  14    normal    CYSTOSCOPY  7/13/15    Robotic cystocele repair, JORGE, lynx sling, sacrocolpopexy    ENDOMETRIAL ABLATION   ?     with bladder sling TVT    ENDOSCOPY, COLON, DIAGNOSTIC  09/15/2017    EGD    HERNIA REPAIR  9640    umbilical    HYSTERECTOMY  12/10/2013    VAGINAL, OVARIES ALSO REMOVED    HYSTERECTOMY, VAGINAL  12/10/2013    RAVH and posterior repair    OTHER SURGICAL HISTORY  2019    SUTURE REPAIR CERVICAL CUP     [place] EGD TRANSORAL BIOPSY SINGLE/MULTIPLE N/A 9/15/2017    EGD BIOPSY performed by Cleveland Lozano DO at [address]         Family History   Problem Relation Age of Onset    High Cholesterol Mother     High Blood Pressure Father     Heart Disease Father         stent    Cancer

## 2020-05-27 ENCOUNTER — OFFICE VISIT (OUTPATIENT)
Dept: ORTHOPEDIC SURGERY | Age: 55
End: 2020-05-27
Payer: COMMERCIAL

## 2020-05-27 PROCEDURE — 99203 OFFICE O/P NEW LOW 30 MIN: CPT | Performed by: ORTHOPAEDIC SURGERY

## 2020-05-27 NOTE — PROGRESS NOTES
patellofemoral pain she does have a positive reproducible Sony's medially no calf tenderness negative Homans  Neurological: Patient is alert and oriented to person, place, and time. Normal strenght. No sensory deficit. Skin: Skin is warm and dry  Psychiatric: Behavior is normal. Thought content normal.  Nursing note and vitals reviewed. Labs and Imaging:     XR taken today:  Xr Knee Left (1-2 Views)    Result Date: 5/27/2020  X-rays taken they reviewed by me show standing AP of both knees and a lateral left knee. Patient has some very modest medial joint space narrowing but nothing too severe. No acute process is noted. Orders Placed This Encounter   Procedures    MRI KNEE LEFT WO CONTRAST     Standing Status:   Future     Standing Expiration Date:   5/27/2021       Assessment and Plan:  1. Chronic pain of left knee            This is a 60 y.o. female who presents to the clinic today for evaluation / follow up of left knee pain suspicious for medial meniscus tear.      Past History:    Current Outpatient Medications:     celecoxib (CELEBREX) 200 MG capsule, Take 1 capsule by mouth daily, Disp: 30 capsule, Rfl: 11    fluticasone (FLONASE) 50 MCG/ACT nasal spray, 1 spray by Each Nostril route daily, Disp: 1 Bottle, Rfl: 3    ibuprofen (ADVIL;MOTRIN) 800 MG tablet, Take 1 tablet by mouth every 8 hours as needed for Pain, Disp: 30 tablet, Rfl: 3    diphenhydrAMINE HCl (BENADRYL ALLERGY PO), Take 1 tablet by mouth as needed, Disp: , Rfl:     Hormone Cream Base CREA, by Does not apply route daily , Disp: , Rfl:     levothyroxine (SYNTHROID) 112 MCG tablet, Take 1 tablet by mouth Daily, Disp: 90 tablet, Rfl: 3    pravastatin (PRAVACHOL) 40 MG tablet, Take 1 tablet by mouth daily, Disp: 90 tablet, Rfl: 3    omeprazole (PRILOSEC) 20 MG delayed release capsule, Take 1 capsule by mouth Daily, Disp: 90 capsule, Rfl: 3    FLUoxetine (PROZAC) 20 MG capsule, TAKE 1 CAPSULE BY MOUTH ONE TIME A DAY

## 2020-05-28 ENCOUNTER — OFFICE VISIT (OUTPATIENT)
Dept: OBGYN CLINIC | Age: 55
End: 2020-05-28
Payer: COMMERCIAL

## 2020-05-28 VITALS
WEIGHT: 230 LBS | HEIGHT: 66 IN | SYSTOLIC BLOOD PRESSURE: 120 MMHG | HEART RATE: 78 BPM | TEMPERATURE: 98.1 F | DIASTOLIC BLOOD PRESSURE: 80 MMHG | BODY MASS INDEX: 36.96 KG/M2

## 2020-05-28 PROCEDURE — 17250 CHEM CAUT OF GRANLTJ TISSUE: CPT | Performed by: OBSTETRICS & GYNECOLOGY

## 2020-05-28 NOTE — PROGRESS NOTES
Urine NEGATIVE NEGATIVE    Specific Gravity, UA 1.022 1.000 - 1.030    Urine Hgb MOD (A) NEGATIVE    pH, UA 6.0 5.0 - 8.0    Protein, UA NEGATIVE NEGATIVE    Urobilinogen, Urine Normal Normal    Nitrite, Urine NEGATIVE NEGATIVE    Leukocyte Esterase, Urine MOD (A) NEGATIVE    Urinalysis Comments NOT REPORTED    Microscopic Urinalysis   Result Value Ref Range    -          WBC, UA 10 TO 20 /HPF    RBC, UA 2 TO 5 /HPF    Casts UA NOT REPORTED /LPF    Crystals, UA NOT REPORTED None /HPF    Epithelial Cells UA 5 TO 10 /HPF    Renal Epithelial, UA NOT REPORTED 0 /HPF    Bacteria, UA FEW (A) None    Mucus, UA NOT REPORTED None    Trichomonas, UA NOT REPORTED None    Amorphous, UA NOT REPORTED None    Other Observations UA NOT REPORTED NOT REQ. Yeast, UA NOT REPORTED None     Procedure:  Sterile prep with Iodine wash. Application of AGNO2 sticks x 14 with lolli-pops for aid in visualization. Tolerated well. No coitus pads only      Assessment:   Diagnosis Orders   1. Granulation tissue of vaginal cuff-hx  [place] CHEMICAL CAUTERIZATION OF GRANULATION TISSUE   2. PCB (post coital bleeding)  [place] CHEMICAL CAUTERIZATION OF GRANULATION TISSUE   3. History of total hysterectomy with BSO.-Robotic 2014     4. HX of Abdominal Sacral Colpopext-Robotically 2017     5.  History of pubovaginal sling-2016       Chief Complaint   Patient presents with    Follow-up         Patient Active Problem List    Diagnosis Date Noted    History of total hysterectomy with BSO.-Robotic 2013 05/07/2020     Priority: High     No abnormal pap hx      HX of Abdominal Sacral Colpopexy-Robotically 2015 05/07/2020     Priority: High    Granulation tissue of vaginal cuff-hx 05/07/2020     Priority: High     Surgery 2019 for removal.      History of pubovaginal sling-2016 07/13/2015     Priority: High    Vaginal polyp 11/08/2019     Priority: Medium    Gastroesophageal reflux disease without esophagitis 10/24/2018     Priority: Medium    Hypothyroidism Priority: Medium    Urinary incontinence 07/13/2015     Priority: Medium    Hyperglycemia 06/03/2019    Mixed hyperlipidemia     Anxiety        PLAN:  Return in about 2 weeks (around 6/11/2020) for Follow-Up On Current Problem-procedure rook. Return to the office in 2-3 weeks. Fu and possible re-application of AGNO2   Counseled on preventative health maintenance follow-up. Orders Placed This Encounter   Procedures    [place] CHEMICAL CAUTERIZATION OF GRANULATION TISSUE       Patient was seen with total face to face time of 15 minutes. More than 50% of this visit was counseling and education regarding The primary encounter diagnosis was Granulation tissue of vaginal cuff-hx. Diagnoses of PCB (post coital bleeding), History of total hysterectomy with BSO.-Robotic 2014, HX of Abdominal Sacral Colpopext-Robotically 2017, and History of pubovaginal sling-2016 were also pertinent to this visit. and Follow-up   as well as  counseling on preventative health maintenance follow-up.

## 2020-06-15 RX ORDER — OMEPRAZOLE 20 MG/1
CAPSULE, DELAYED RELEASE ORAL
Qty: 90 CAPSULE | Refills: 3 | Status: SHIPPED | OUTPATIENT
Start: 2020-06-15 | End: 2021-07-06

## 2020-06-15 RX ORDER — FLUTICASONE PROPIONATE 50 MCG
SPRAY, SUSPENSION (ML) NASAL
Qty: 16 G | Refills: 3 | Status: SHIPPED | OUTPATIENT
Start: 2020-06-15 | End: 2021-08-20

## 2020-06-15 RX ORDER — PRAVASTATIN SODIUM 40 MG
TABLET ORAL
Qty: 90 TABLET | Refills: 3 | Status: SHIPPED
Start: 2020-06-15 | End: 2020-09-18 | Stop reason: ALTCHOICE

## 2020-06-15 RX ORDER — FLUOXETINE HYDROCHLORIDE 20 MG/1
CAPSULE ORAL
Qty: 90 CAPSULE | Refills: 3 | Status: SHIPPED | OUTPATIENT
Start: 2020-06-15 | End: 2021-07-06

## 2020-06-16 ENCOUNTER — OFFICE VISIT (OUTPATIENT)
Dept: OBGYN CLINIC | Age: 55
End: 2020-06-16
Payer: COMMERCIAL

## 2020-06-16 VITALS
DIASTOLIC BLOOD PRESSURE: 80 MMHG | SYSTOLIC BLOOD PRESSURE: 120 MMHG | TEMPERATURE: 97.4 F | BODY MASS INDEX: 36.96 KG/M2 | HEIGHT: 66 IN | WEIGHT: 230 LBS | HEART RATE: 68 BPM

## 2020-06-16 PROCEDURE — 17250 CHEM CAUT OF GRANLTJ TISSUE: CPT | Performed by: OBSTETRICS & GYNECOLOGY

## 2020-06-16 NOTE — PROGRESS NOTES
Med Rec: [id number]   Path Number: [id number]   Collected: 7/11/2019   Received: 7/12/2019   Reported: 7/15/2019 12:59     -- Diagnosis --   VAGINA, BIOPSIES:        -REFRACTILE FOREIGN BODY MATERIAL AND GRANULATION TISSUE.        -NEGATIVE FOR ATYPIA OR MALIGNANCY.        -INTACT MUCOSA NOT IDENTIFIED. [facility], M.D.   **Electronically Signed Out**         jet/7/15/2019         Clinical Information   Pre-op Diagnosis:  VAGINAL POLYP   Operative Findings:  VAGINAL POLYP     Source of Specimen   1: VAGINAL POLYP     Gross Description   \"TAVO ROMERO, VAGINAL POLYP\" Multiple fragments of tan-gray   soft tissue with small fragments of blood clot, measuring in aggregate   1.0 x 0.9 x 0.4 cm.  Entirely 1cs.  tm       Microscopic Description   Microscopic examination performed. Results for orders placed or performed during the hospital encounter of 05/12/20   Culture, Urine   Result Value Ref Range    Specimen Description . CLEAN CATCH URINE     Special Requests NOT REPORTED     Culture NO SIGNIFICANT GROWTH    Urinalysis Reflex to Culture   Result Value Ref Range    Color, UA YELLOW YELLOW    Turbidity UA CLOUDY (A) CLEAR    Glucose, Ur NEGATIVE NEGATIVE    Bilirubin Urine NEGATIVE NEGATIVE    Ketones, Urine NEGATIVE NEGATIVE    Specific Gravity, UA 1.022 1.000 - 1.030    Urine Hgb MOD (A) NEGATIVE    pH, UA 6.0 5.0 - 8.0    Protein, UA NEGATIVE NEGATIVE    Urobilinogen, Urine Normal Normal    Nitrite, Urine NEGATIVE NEGATIVE    Leukocyte Esterase, Urine MOD (A) NEGATIVE    Urinalysis Comments NOT REPORTED    Microscopic Urinalysis   Result Value Ref Range    -          WBC, UA 10 TO 20 /HPF    RBC, UA 2 TO 5 /HPF    Casts UA NOT REPORTED /LPF    Crystals, UA NOT REPORTED None /HPF    Epithelial Cells UA 5 TO 10 /HPF    Renal Epithelial, UA NOT REPORTED 0 /HPF    Bacteria, UA FEW (A) None    Mucus, UA NOT REPORTED None    Trichomonas, UA NOT REPORTED None    Amorphous, UA NOT REPORTED None

## 2020-06-17 ENCOUNTER — HOSPITAL ENCOUNTER (OUTPATIENT)
Dept: MRI IMAGING | Age: 55
Discharge: HOME | End: 2020-06-19
Payer: COMMERCIAL

## 2020-06-17 PROCEDURE — 73721 MRI JNT OF LWR EXTRE W/O DYE: CPT

## 2020-06-19 ENCOUNTER — TELEPHONE (OUTPATIENT)
Dept: ORTHOPEDIC SURGERY | Age: 55
End: 2020-06-19

## 2020-06-19 NOTE — TELEPHONE ENCOUNTER
----- Message from Elizabeth Patel MD sent at 6/17/2020  2:46 PM EDT -----  No obvious meniscal pathology, some arthritis throughout the knee, worst in the PF compartment.  Would have her start on Mobic

## 2020-06-19 NOTE — TELEPHONE ENCOUNTER
She has tried mobic and it really tears her stomach up. She is currently on celebrex and it isn't really helping the knee pain. Anything else you want to try instead?

## 2020-06-25 ENCOUNTER — TELEMEDICINE (OUTPATIENT)
Dept: FAMILY MEDICINE CLINIC | Age: 55
End: 2020-06-25
Payer: COMMERCIAL

## 2020-06-25 ENCOUNTER — PATIENT MESSAGE (OUTPATIENT)
Dept: FAMILY MEDICINE CLINIC | Age: 55
End: 2020-06-25

## 2020-06-25 PROCEDURE — 99214 OFFICE O/P EST MOD 30 MIN: CPT | Performed by: FAMILY MEDICINE

## 2020-06-25 ASSESSMENT — PATIENT HEALTH QUESTIONNAIRE - PHQ9
SUM OF ALL RESPONSES TO PHQ9 QUESTIONS 1 & 2: 0
2. FEELING DOWN, DEPRESSED OR HOPELESS: 0
SUM OF ALL RESPONSES TO PHQ QUESTIONS 1-9: 0
1. LITTLE INTEREST OR PLEASURE IN DOING THINGS: 0
SUM OF ALL RESPONSES TO PHQ QUESTIONS 1-9: 0

## 2020-06-25 ASSESSMENT — ENCOUNTER SYMPTOMS
ABDOMINAL PAIN: 0
COUGH: 0
RHINORRHEA: 0
SHORTNESS OF BREATH: 0
NAUSEA: 0
CHEST TIGHTNESS: 0
ABDOMINAL DISTENTION: 0
VOMITING: 0
SORE THROAT: 0
BACK PAIN: 0
DIARRHEA: 0
CONSTIPATION: 0

## 2020-06-26 ENCOUNTER — HOSPITAL ENCOUNTER (OUTPATIENT)
Age: 55
Discharge: HOME | End: 2020-06-26
Payer: COMMERCIAL

## 2020-06-26 LAB
CCP IGG ANTIBODIES: <1.5 U/ML
MYOGLOBIN: 33 NG/ML (ref 25–58)
RHEUMATOID FACTOR: <10 IU/ML
SEDIMENTATION RATE, ERYTHROCYTE: 27 MM (ref 0–20)
TOTAL CK: 116 U/L (ref 26–192)

## 2020-06-26 PROCEDURE — 86431 RHEUMATOID FACTOR QUANT: CPT

## 2020-06-26 PROCEDURE — 86200 CCP ANTIBODY: CPT

## 2020-06-26 PROCEDURE — 82550 ASSAY OF CK (CPK): CPT

## 2020-06-26 PROCEDURE — 86038 ANTINUCLEAR ANTIBODIES: CPT

## 2020-06-26 PROCEDURE — 83874 ASSAY OF MYOGLOBIN: CPT

## 2020-06-26 PROCEDURE — 36415 COLL VENOUS BLD VENIPUNCTURE: CPT

## 2020-06-26 PROCEDURE — 85651 RBC SED RATE NONAUTOMATED: CPT

## 2020-06-29 LAB — ANTI-NUCLEAR ANTIBODY (ANA): NEGATIVE

## 2020-07-02 ENCOUNTER — OFFICE VISIT (OUTPATIENT)
Dept: GYNECOLOGIC ONCOLOGY | Age: 55
End: 2020-07-02
Payer: COMMERCIAL

## 2020-07-02 VITALS
WEIGHT: 245.4 LBS | DIASTOLIC BLOOD PRESSURE: 94 MMHG | HEART RATE: 65 BPM | SYSTOLIC BLOOD PRESSURE: 130 MMHG | HEIGHT: 66 IN | BODY MASS INDEX: 39.44 KG/M2 | TEMPERATURE: 96.4 F

## 2020-07-02 PROCEDURE — 99204 OFFICE O/P NEW MOD 45 MIN: CPT | Performed by: OBSTETRICS & GYNECOLOGY

## 2020-07-02 NOTE — PROGRESS NOTES
[facility], [facility], [address] [address]      I am seeing Isai Kelly for New Patient at the request of Dr. Case De La Garza, OB/GYN, or [facility]. Chief Complaint: Vaginal bleeding    HPI  She is a 60 y.o.  (both vaginal deliveries) female who is being referred for vaginal bleeding and a large area of granulation tissue in the upper vagina. The patient had a robotic vaginal colpopexy approximately\"4- 5 years ago\" by Dr. Mich Billings. about a year ago she began bleeding. She saw her gynecologist at the time Dr. Katty Bo, who \"pulled a stitch out\". The bleeding continued and he took her to surgery and oversewed the area. However the bleeding began again within a few months. She recently saw Dr. Renata Portillo who noted granulation tissue in the upper right vaginal fornix. He treated this with silver nitrate. He had a return and there was more granulation present and he treated this again with silver nitrate. He is referred her on to [address] [place] [address] gynecologic oncology for discussion of management options. The patient also claims to have had a urethrovesical sling performed about 10 years ago. She has no urinary leaking. She is having vaginal intercourse and does experience more bleeding afterwards. She has no difficulty with her bowels. She has no pain. She has noticed no purulent discharge and has had no fevers. Review of Systems  I have personally reviewed and agree with the review of systems done by my ancillary staff in the [facility] documentation. OBJECTIVE:  Vitals:    20 1323   BP: (!) 131/90   Pulse: 68   Temp: 96.4 °F (35.8 °C)   TempSrc: Temporal   Weight: 245 lb 6.4 oz (111.3 kg)   Height: 5' 6\" (1.676 m)       General: Alert and oriented x3, no acute distress    HEENT:  EOM intact, VIDHYA, no cervical or supraclavicular lymphadenopathy. No Thyromegaly. Heart:  Auscultation of heart revels a regular rate with no murmur, gallops, or rubs. Lungs:  Clear bilaterally to auscultation to the bases. CVA: No tenderness. Abdomen: Obese. Laparoscopic incision sites. No herniations. No rashes. No tenderness. Lower Extremities:  No lymphedema, no calf tenderness, no musculoskeletal deformities. Pelvic Exam: Normal external genitalia. No lesions are seen. Normal-appearing urethra, lower vagina, perineum and anus. The urethrovesical angle is behind the symphysis pubis. Speculum examination is performed. Estrogenization is adequate. The patient has a large cystocele and a moderate size rectocele. In the upper vagina of the vaginal vaginal sidewalls are falling in but the apex is supported in the right upper fornix. A large patch of granulation tissue was present approximately 3 x 2 cm. No suture or mesh is noted. There was no purulence or foul odor. Bimanual examination reveals no nodularity or other pathology in the upper vagina or elsewhere in the pelvis. There was no tenderness. Family History   Problem Relation Age of Onset    High Cholesterol Mother     High Blood Pressure Father     Heart Disease Father         stent    Cancer Father         bladder    Breast Cancer Maternal Aunt 79    Heart Disease Maternal Grandfather     Arthritis Maternal Grandfather     Cancer Maternal Grandfather         bone CA    Other Paternal Grandmother         Leukemia    Other Paternal Grandfather         Leukemia    Heart Disease Paternal Grandfather     No Known Problems Sister     No Known Problems Brother        Assessment:  1. Granulation tissue of vaginal cuff-hx      History of robotic vaginal vault colpopexy    Vaginal bleeding    Large cystocele and rectocele    Discussion: Discussion was held with the patient concerning management options. I do not see or feel a mesh or suture but the granulation tissue is quite florid.   I feel that we could remove the granulation tissue fairly easily with the ultrasonic scalpel and the long handpiece. However explained to her that we may find a suture or mesh behind it. At that point we would want to attempt to suture over the area to prevent recurrence. If she continues to have bleeding or and erosion develops, I would suggest she return to Dr. Odette Paul as it appears she will need to have a vaginal pair with cystocele rectocele enterocele and vault suspension at some point in the future anyway. Plan: Excision of vaginal lesion with ultrasonic scalpel (long tipped handpiece). Follow Up: Postoperatively      INFORMED CONSENT:  We discussed options including but not limited to observation, and various surgical approaches including excision and ultrasonic scalpel treatment. Patient prefers ultrasonic scalpel treatment. Benefits of the procedure(s) include but not limited to treatment, possible staging of precancerous/cancerous lesions and/or improvement in systems and quality of life. The procedure(s) were explained in great detail along with all the possible risks and complications including, but not limited to blood loss requiring transfusions, DVT requiring blood thinning agents, injury to surrounding structures including bladder, bowel ureters, etc., as well as the possibility of infection requiring prophylactic antibiotics. I spent 45 minutes providing care to the patient and >50% of the time was spent counseling and conoordinating care, discussing the patient's current situation, reviewing her options, counseling and education her and answering her questions. All of the patient's pertinent images, labs and previous records and procedures were reviewed.     Electronically signed by Jose Rafael Crews MD on 7/2/20 at 2:04 PM EDT

## 2020-07-02 NOTE — PROGRESS NOTES
Review of Systems   Genitourinary: Positive for vaginal bleeding (for 1 year). All other systems reviewed and are negative.

## 2020-07-07 ENCOUNTER — PREP FOR PROCEDURE (OUTPATIENT)
Dept: GYNECOLOGIC ONCOLOGY | Age: 55
End: 2020-07-07

## 2020-07-07 ENCOUNTER — TELEPHONE (OUTPATIENT)
Dept: GYNECOLOGIC ONCOLOGY | Age: 55
End: 2020-07-07

## 2020-07-07 RX ORDER — DEXAMETHASONE 0.5 MG/1
4 TABLET ORAL ONCE
Status: CANCELLED | OUTPATIENT
Start: 2020-07-07 | End: 2020-07-07

## 2020-07-07 RX ORDER — SODIUM CHLORIDE 0.9 % (FLUSH) 0.9 %
10 SYRINGE (ML) INJECTION EVERY 12 HOURS SCHEDULED
Status: CANCELLED | OUTPATIENT
Start: 2020-07-07

## 2020-07-07 RX ORDER — SODIUM CHLORIDE 0.9 % (FLUSH) 0.9 %
10 SYRINGE (ML) INJECTION PRN
Status: CANCELLED | OUTPATIENT
Start: 2020-07-07

## 2020-07-07 RX ORDER — ONDANSETRON 2 MG/ML
4 INJECTION INTRAMUSCULAR; INTRAVENOUS ONCE
Status: CANCELLED | OUTPATIENT
Start: 2020-07-07 | End: 2020-07-07

## 2020-07-07 RX ORDER — SODIUM CHLORIDE 9 MG/ML
INJECTION, SOLUTION INTRAVENOUS CONTINUOUS
Status: CANCELLED | OUTPATIENT
Start: 2020-07-07

## 2020-07-07 RX ORDER — CELECOXIB 50 MG/1
200 CAPSULE ORAL ONCE
Status: CANCELLED | OUTPATIENT
Start: 2020-07-07 | End: 2020-07-07

## 2020-07-07 NOTE — TELEPHONE ENCOUNTER
LVM to return call. Calling notify Surgery scheduled @ [facility] on 7/14/20 12:10pm;arrive 10:10am. PAT same day. Covid testing [email or web address] [address] [facility] and go in.  Post op appt 7/28/20 @9:00am.

## 2020-07-08 ENCOUNTER — TELEPHONE (OUTPATIENT)
Dept: GYNECOLOGIC ONCOLOGY | Age: 55
End: 2020-07-08

## 2020-07-08 NOTE — TELEPHONE ENCOUNTER
Returned call to patient and left detailed message explaining healing period and need for assessment of her work. Explained we will not want her to do any strenuous activity and she may be tender or discomfort if prolonged periods of sitting at her job. Recommending 10 day-2 week return to work based on healing and again her work status. Pt will be see at 2 week post op for accurate evaluation of healing and clearance for return to work. She may certainly call back if she has any other additional questions or concerns regarding her surgery and post operative period. normal behavior

## 2020-07-08 NOTE — TELEPHONE ENCOUNTER
Patient is schedule for Partial Vaginectomywith US Scalpel (Misonex)  on Tuesday, 7/14. She's asking how long she'll need to be off work following the procedure (her portion of [facility] paperwork). She works nights and dais it's okay to leave a message on her voice mail.

## 2020-07-10 ENCOUNTER — HOSPITAL ENCOUNTER (OUTPATIENT)
Dept: PREADMISSION TESTING | Age: 55
Setting detail: SPECIMEN
Discharge: HOME | End: 2020-07-14
Payer: COMMERCIAL

## 2020-07-10 PROCEDURE — U0004 COV-19 TEST NON-CDC HGH THRU: HCPCS

## 2020-07-12 LAB
SARS-COV-2, PCR: NOT DETECTED
SARS-COV-2, RAPID: NORMAL
SARS-COV-2: NORMAL
SOURCE: NORMAL

## 2020-07-13 ENCOUNTER — TELEPHONE (OUTPATIENT)
Dept: PRIMARY CARE CLINIC | Age: 55
End: 2020-07-13

## 2020-07-13 ENCOUNTER — TELEPHONE (OUTPATIENT)
Dept: GYNECOLOGIC ONCOLOGY | Age: 55
End: 2020-07-13

## 2020-07-13 NOTE — TELEPHONE ENCOUNTER
Patient confirming details for surgery tomorrow. Writer instructed patient to arrive at 10:10 AM, surgery is scheduled at 12:10 PM at [facility]. Go to Surgery entrance near ER. Nothing to eat or drink after midnight. If taking medications in the morning, take with sips of water only. Post-op visit scheduled on July 28 at 9 AM.     Patient verbalized understanding and thanked writer.

## 2020-07-14 ENCOUNTER — HOSPITAL ENCOUNTER (OUTPATIENT)
Age: 55
Setting detail: OUTPATIENT SURGERY
Discharge: HOME | End: 2020-07-14
Attending: OBSTETRICS & GYNECOLOGY | Admitting: OBSTETRICS & GYNECOLOGY
Payer: COMMERCIAL

## 2020-07-14 ENCOUNTER — HOSPITAL ENCOUNTER (OUTPATIENT)
Age: 55
Discharge: HOME | End: 2020-07-16
Attending: OBSTETRICS & GYNECOLOGY
Payer: COMMERCIAL

## 2020-07-14 ENCOUNTER — ANESTHESIA (OUTPATIENT)
Dept: OPERATING ROOM | Age: 55
End: 2020-07-14
Payer: COMMERCIAL

## 2020-07-14 ENCOUNTER — ANESTHESIA EVENT (OUTPATIENT)
Dept: OPERATING ROOM | Age: 55
End: 2020-07-14
Payer: COMMERCIAL

## 2020-07-14 ENCOUNTER — HOSPITAL ENCOUNTER (OUTPATIENT)
Dept: GENERAL RADIOLOGY | Age: 55
Setting detail: OUTPATIENT SURGERY
Discharge: HOME | End: 2020-07-16
Attending: OBSTETRICS & GYNECOLOGY
Payer: COMMERCIAL

## 2020-07-14 VITALS
RESPIRATION RATE: 16 BRPM | HEART RATE: 60 BPM | TEMPERATURE: 96.8 F | BODY MASS INDEX: 37.77 KG/M2 | SYSTOLIC BLOOD PRESSURE: 128 MMHG | WEIGHT: 235 LBS | DIASTOLIC BLOOD PRESSURE: 62 MMHG | HEIGHT: 66 IN | OXYGEN SATURATION: 98 %

## 2020-07-14 VITALS — TEMPERATURE: 96.1 F | SYSTOLIC BLOOD PRESSURE: 91 MMHG | DIASTOLIC BLOOD PRESSURE: 65 MMHG | OXYGEN SATURATION: 100 %

## 2020-07-14 PROBLEM — Z98.890 POSTOPERATIVE STATE: Status: ACTIVE | Noted: 2020-07-14

## 2020-07-14 PROBLEM — Z98.890 POSTOPERATIVE STATE: Status: RESOLVED | Noted: 2020-07-14 | Resolved: 2020-07-14

## 2020-07-14 LAB
-: NORMAL
ABSOLUTE EOS #: 0.22 K/UL (ref 0–0.44)
ABSOLUTE IMMATURE GRANULOCYTE: 0.03 K/UL (ref 0–0.3)
ABSOLUTE LYMPH #: 2.38 K/UL (ref 1.1–3.7)
ABSOLUTE MONO #: 0.45 K/UL (ref 0.1–1.2)
BASOPHILS # BLD: 1 % (ref 0–2)
BASOPHILS ABSOLUTE: 0.05 K/UL (ref 0–0.2)
DIFFERENTIAL TYPE: ABNORMAL
EOSINOPHILS RELATIVE PERCENT: 3 % (ref 1–4)
HCT VFR BLD CALC: 43.5 % (ref 36.3–47.1)
HEMOGLOBIN: 16.3 G/DL (ref 11.9–15.1)
IMMATURE GRANULOCYTES: 0 %
LYMPHOCYTES # BLD: 32 % (ref 24–43)
MCH RBC QN AUTO: 33.8 PG (ref 25.2–33.5)
MCHC RBC AUTO-ENTMCNC: 37.5 G/DL (ref 28.4–34.8)
MCV RBC AUTO: 90.2 FL (ref 82.6–102.9)
MONOCYTES # BLD: 6 % (ref 3–12)
NRBC AUTOMATED: 0 PER 100 WBC
PDW BLD-RTO: 13.4 % (ref 11.8–14.4)
PLATELET # BLD: 292 K/UL (ref 138–453)
PLATELET ESTIMATE: ABNORMAL
PMV BLD AUTO: 9.8 FL (ref 8.1–13.5)
RBC # BLD: 4.82 M/UL (ref 3.95–5.11)
RBC # BLD: ABNORMAL 10*6/UL
REASON FOR REJECTION: NORMAL
SEG NEUTROPHILS: 58 % (ref 36–65)
SEGMENTED NEUTROPHILS ABSOLUTE COUNT: 4.28 K/UL (ref 1.5–8.1)
WBC # BLD: 7.4 K/UL (ref 3.5–11.3)
WBC # BLD: ABNORMAL 10*3/UL
ZZ NTE CLEAN UP: ORDERED TEST: NORMAL
ZZ NTE WITH NAME CLEAN UP: SPECIMEN SOURCE: NORMAL

## 2020-07-14 PROCEDURE — 57107 VAGNC COMPL RMVL PARAVAG TIS: CPT | Performed by: OBSTETRICS & GYNECOLOGY

## 2020-07-14 PROCEDURE — 7100000001 HC PACU RECOVERY - ADDTL 15 MIN: Performed by: OBSTETRICS & GYNECOLOGY

## 2020-07-14 PROCEDURE — 3600000015 HC SURGERY LEVEL 5 ADDTL 15MIN: Performed by: OBSTETRICS & GYNECOLOGY

## 2020-07-14 PROCEDURE — 2709999900 HC NON-CHARGEABLE SUPPLY: Performed by: OBSTETRICS & GYNECOLOGY

## 2020-07-14 PROCEDURE — 57200 REPAIR OF VAGINA: CPT | Performed by: OBSTETRICS & GYNECOLOGY

## 2020-07-14 PROCEDURE — 71046 X-RAY EXAM CHEST 2 VIEWS: CPT

## 2020-07-14 PROCEDURE — 7100000040 HC SPAR PHASE II RECOVERY - FIRST 15 MIN: Performed by: OBSTETRICS & GYNECOLOGY

## 2020-07-14 PROCEDURE — 2720000010 HC SURG SUPPLY STERILE: Performed by: OBSTETRICS & GYNECOLOGY

## 2020-07-14 PROCEDURE — 6360000002 HC RX W HCPCS: Performed by: PHYSICIAN ASSISTANT

## 2020-07-14 PROCEDURE — 88305 TISSUE EXAM BY PATHOLOGIST: CPT

## 2020-07-14 PROCEDURE — 7100000000 HC PACU RECOVERY - FIRST 15 MIN: Performed by: OBSTETRICS & GYNECOLOGY

## 2020-07-14 PROCEDURE — 6370000000 HC RX 637 (ALT 250 FOR IP): Performed by: PHYSICIAN ASSISTANT

## 2020-07-14 PROCEDURE — 7100000041 HC SPAR PHASE II RECOVERY - ADDTL 15 MIN: Performed by: OBSTETRICS & GYNECOLOGY

## 2020-07-14 PROCEDURE — 3600000005 HC SURGERY LEVEL 5 BASE: Performed by: OBSTETRICS & GYNECOLOGY

## 2020-07-14 PROCEDURE — 80048 BASIC METABOLIC PNL TOTAL CA: CPT

## 2020-07-14 PROCEDURE — 6370000000 HC RX 637 (ALT 250 FOR IP): Performed by: OBSTETRICS & GYNECOLOGY

## 2020-07-14 PROCEDURE — 2500000003 HC RX 250 WO HCPCS: Performed by: NURSE ANESTHETIST, CERTIFIED REGISTERED

## 2020-07-14 PROCEDURE — 85025 COMPLETE CBC W/AUTO DIFF WBC: CPT

## 2020-07-14 PROCEDURE — 6360000002 HC RX W HCPCS: Performed by: NURSE ANESTHETIST, CERTIFIED REGISTERED

## 2020-07-14 PROCEDURE — 3700000000 HC ANESTHESIA ATTENDED CARE: Performed by: OBSTETRICS & GYNECOLOGY

## 2020-07-14 PROCEDURE — 3700000001 HC ADD 15 MINUTES (ANESTHESIA): Performed by: OBSTETRICS & GYNECOLOGY

## 2020-07-14 PROCEDURE — 6360000002 HC RX W HCPCS

## 2020-07-14 PROCEDURE — 2580000003 HC RX 258: Performed by: NURSE ANESTHETIST, CERTIFIED REGISTERED

## 2020-07-14 RX ORDER — LIDOCAINE HYDROCHLORIDE 10 MG/ML
INJECTION, SOLUTION EPIDURAL; INFILTRATION; INTRACAUDAL; PERINEURAL PRN
Status: DISCONTINUED | OUTPATIENT
Start: 2020-07-14 | End: 2020-07-14 | Stop reason: SDUPTHER

## 2020-07-14 RX ORDER — DEXAMETHASONE 4 MG/1
4 TABLET ORAL ONCE
Status: COMPLETED | OUTPATIENT
Start: 2020-07-14 | End: 2020-07-14

## 2020-07-14 RX ORDER — SODIUM CHLORIDE 0.9 % (FLUSH) 0.9 %
10 SYRINGE (ML) INJECTION EVERY 12 HOURS SCHEDULED
Status: DISCONTINUED | OUTPATIENT
Start: 2020-07-14 | End: 2020-07-14 | Stop reason: HOSPADM

## 2020-07-14 RX ORDER — SODIUM CHLORIDE, SODIUM LACTATE, POTASSIUM CHLORIDE, CALCIUM CHLORIDE 600; 310; 30; 20 MG/100ML; MG/100ML; MG/100ML; MG/100ML
INJECTION, SOLUTION INTRAVENOUS CONTINUOUS PRN
Status: DISCONTINUED | OUTPATIENT
Start: 2020-07-14 | End: 2020-07-14 | Stop reason: SDUPTHER

## 2020-07-14 RX ORDER — IBUPROFEN 800 MG/1
800 TABLET ORAL 2 TIMES DAILY PRN
Qty: 20 TABLET | Refills: 0 | Status: SHIPPED | OUTPATIENT
Start: 2020-07-14 | End: 2020-07-30 | Stop reason: SDUPTHER

## 2020-07-14 RX ORDER — MIDAZOLAM HYDROCHLORIDE 1 MG/ML
INJECTION INTRAMUSCULAR; INTRAVENOUS
Status: COMPLETED
Start: 2020-07-14 | End: 2020-07-14

## 2020-07-14 RX ORDER — CELECOXIB 100 MG/1
200 CAPSULE ORAL ONCE
Status: COMPLETED | OUTPATIENT
Start: 2020-07-14 | End: 2020-07-14

## 2020-07-14 RX ORDER — SODIUM CHLORIDE 0.9 % (FLUSH) 0.9 %
10 SYRINGE (ML) INJECTION PRN
Status: DISCONTINUED | OUTPATIENT
Start: 2020-07-14 | End: 2020-07-14 | Stop reason: HOSPADM

## 2020-07-14 RX ORDER — ONDANSETRON 2 MG/ML
INJECTION INTRAMUSCULAR; INTRAVENOUS PRN
Status: DISCONTINUED | OUTPATIENT
Start: 2020-07-14 | End: 2020-07-14 | Stop reason: SDUPTHER

## 2020-07-14 RX ORDER — ONDANSETRON 2 MG/ML
4 INJECTION INTRAMUSCULAR; INTRAVENOUS ONCE
Status: DISCONTINUED | OUTPATIENT
Start: 2020-07-14 | End: 2020-07-14 | Stop reason: HOSPADM

## 2020-07-14 RX ORDER — PROPOFOL 10 MG/ML
INJECTION, EMULSION INTRAVENOUS PRN
Status: DISCONTINUED | OUTPATIENT
Start: 2020-07-14 | End: 2020-07-14 | Stop reason: SDUPTHER

## 2020-07-14 RX ORDER — CLINDAMYCIN PHOSPHATE 20 MG/G
CREAM VAGINAL PRN
Status: DISCONTINUED | OUTPATIENT
Start: 2020-07-14 | End: 2020-07-14 | Stop reason: ALTCHOICE

## 2020-07-14 RX ORDER — DIPHENHYDRAMINE HYDROCHLORIDE 50 MG/ML
INJECTION INTRAMUSCULAR; INTRAVENOUS PRN
Status: DISCONTINUED | OUTPATIENT
Start: 2020-07-14 | End: 2020-07-14 | Stop reason: SDUPTHER

## 2020-07-14 RX ORDER — ONDANSETRON 2 MG/ML
4 INJECTION INTRAMUSCULAR; INTRAVENOUS
Status: DISCONTINUED | OUTPATIENT
Start: 2020-07-14 | End: 2020-07-14 | Stop reason: HOSPADM

## 2020-07-14 RX ORDER — FENTANYL CITRATE 50 UG/ML
50 INJECTION, SOLUTION INTRAMUSCULAR; INTRAVENOUS EVERY 5 MIN PRN
Status: DISCONTINUED | OUTPATIENT
Start: 2020-07-14 | End: 2020-07-14 | Stop reason: HOSPADM

## 2020-07-14 RX ORDER — MIDAZOLAM HYDROCHLORIDE 1 MG/ML
2 INJECTION INTRAMUSCULAR; INTRAVENOUS
Status: COMPLETED | OUTPATIENT
Start: 2020-07-14 | End: 2020-07-14

## 2020-07-14 RX ORDER — KETOROLAC TROMETHAMINE 30 MG/ML
INJECTION, SOLUTION INTRAMUSCULAR; INTRAVENOUS PRN
Status: DISCONTINUED | OUTPATIENT
Start: 2020-07-14 | End: 2020-07-14 | Stop reason: SDUPTHER

## 2020-07-14 RX ORDER — SODIUM CHLORIDE 9 MG/ML
INJECTION, SOLUTION INTRAVENOUS CONTINUOUS
Status: DISCONTINUED | OUTPATIENT
Start: 2020-07-14 | End: 2020-07-14 | Stop reason: HOSPADM

## 2020-07-14 RX ORDER — IBUPROFEN 800 MG/1
800 TABLET ORAL EVERY 8 HOURS PRN
Qty: 30 TABLET | Refills: 0 | Status: SHIPPED | OUTPATIENT
Start: 2020-07-14 | End: 2021-01-04

## 2020-07-14 RX ORDER — ONDANSETRON 2 MG/ML
4 INJECTION INTRAMUSCULAR; INTRAVENOUS ONCE
Status: COMPLETED | OUTPATIENT
Start: 2020-07-14 | End: 2020-07-14

## 2020-07-14 RX ORDER — FENTANYL CITRATE 50 UG/ML
INJECTION, SOLUTION INTRAMUSCULAR; INTRAVENOUS PRN
Status: DISCONTINUED | OUTPATIENT
Start: 2020-07-14 | End: 2020-07-14 | Stop reason: SDUPTHER

## 2020-07-14 RX ORDER — DEXAMETHASONE SODIUM PHOSPHATE 10 MG/ML
INJECTION INTRAMUSCULAR; INTRAVENOUS PRN
Status: DISCONTINUED | OUTPATIENT
Start: 2020-07-14 | End: 2020-07-14 | Stop reason: SDUPTHER

## 2020-07-14 RX ORDER — FENTANYL CITRATE 50 UG/ML
25 INJECTION, SOLUTION INTRAMUSCULAR; INTRAVENOUS ONCE
Status: DISCONTINUED | OUTPATIENT
Start: 2020-07-14 | End: 2020-07-14 | Stop reason: HOSPADM

## 2020-07-14 RX ORDER — SODIUM CHLORIDE, SODIUM LACTATE, POTASSIUM CHLORIDE, CALCIUM CHLORIDE 600; 310; 30; 20 MG/100ML; MG/100ML; MG/100ML; MG/100ML
INJECTION, SOLUTION INTRAVENOUS CONTINUOUS
Status: DISCONTINUED | OUTPATIENT
Start: 2020-07-14 | End: 2020-07-14 | Stop reason: HOSPADM

## 2020-07-14 RX ADMIN — SODIUM CHLORIDE, POTASSIUM CHLORIDE, SODIUM LACTATE AND CALCIUM CHLORIDE: 600; 310; 30; 20 INJECTION, SOLUTION INTRAVENOUS at 12:10

## 2020-07-14 RX ADMIN — PROPOFOL 200 MG: 10 INJECTION, EMULSION INTRAVENOUS at 12:17

## 2020-07-14 RX ADMIN — FENTANYL CITRATE 50 MCG: 50 INJECTION INTRAMUSCULAR; INTRAVENOUS at 12:46

## 2020-07-14 RX ADMIN — Medication 12.5 MG: at 12:22

## 2020-07-14 RX ADMIN — ONDANSETRON 4 MG: 2 INJECTION INTRAMUSCULAR; INTRAVENOUS at 11:36

## 2020-07-14 RX ADMIN — MIDAZOLAM HYDROCHLORIDE 2 MG: 1 INJECTION, SOLUTION INTRAMUSCULAR; INTRAVENOUS at 11:49

## 2020-07-14 RX ADMIN — LIDOCAINE HYDROCHLORIDE 50 MG: 10 INJECTION, SOLUTION EPIDURAL; INFILTRATION; INTRACAUDAL; PERINEURAL at 12:17

## 2020-07-14 RX ADMIN — CELECOXIB 200 MG: 100 CAPSULE ORAL at 11:23

## 2020-07-14 RX ADMIN — DEXAMETHASONE 4 MG: 4 TABLET ORAL at 11:23

## 2020-07-14 RX ADMIN — ONDANSETRON 4 MG: 2 INJECTION, SOLUTION INTRAMUSCULAR; INTRAVENOUS at 12:22

## 2020-07-14 RX ADMIN — FENTANYL CITRATE 50 MCG: 50 INJECTION INTRAMUSCULAR; INTRAVENOUS at 12:17

## 2020-07-14 RX ADMIN — MIDAZOLAM HYDROCHLORIDE 2 MG: 1 INJECTION INTRAMUSCULAR; INTRAVENOUS at 11:49

## 2020-07-14 RX ADMIN — KETOROLAC TROMETHAMINE 30 MG: 30 INJECTION, SOLUTION INTRAMUSCULAR; INTRAVENOUS at 13:17

## 2020-07-14 RX ADMIN — DEXAMETHASONE SODIUM PHOSPHATE 10 MG: 10 INJECTION INTRAMUSCULAR; INTRAVENOUS at 12:22

## 2020-07-14 ASSESSMENT — PULMONARY FUNCTION TESTS
PIF_VALUE: 17
PIF_VALUE: 4
PIF_VALUE: 17
PIF_VALUE: 0
PIF_VALUE: 17
PIF_VALUE: 0
PIF_VALUE: 13
PIF_VALUE: 21
PIF_VALUE: 3
PIF_VALUE: 4
PIF_VALUE: 3
PIF_VALUE: 22
PIF_VALUE: 20
PIF_VALUE: 2
PIF_VALUE: 20
PIF_VALUE: 18
PIF_VALUE: 4
PIF_VALUE: 4
PIF_VALUE: 5
PIF_VALUE: 6
PIF_VALUE: 3
PIF_VALUE: 4
PIF_VALUE: 16
PIF_VALUE: 17
PIF_VALUE: 4
PIF_VALUE: 17
PIF_VALUE: 17
PIF_VALUE: 2
PIF_VALUE: 20
PIF_VALUE: 19
PIF_VALUE: 0
PIF_VALUE: 17
PIF_VALUE: 4
PIF_VALUE: 21
PIF_VALUE: 0
PIF_VALUE: 0
PIF_VALUE: 4
PIF_VALUE: 4
PIF_VALUE: 17
PIF_VALUE: 21
PIF_VALUE: 4
PIF_VALUE: 4
PIF_VALUE: 17
PIF_VALUE: 4
PIF_VALUE: 3
PIF_VALUE: 21
PIF_VALUE: 17
PIF_VALUE: 17
PIF_VALUE: 7
PIF_VALUE: 16
PIF_VALUE: 17
PIF_VALUE: 5
PIF_VALUE: 1
PIF_VALUE: 22
PIF_VALUE: 3
PIF_VALUE: 3
PIF_VALUE: 23
PIF_VALUE: 4
PIF_VALUE: 17
PIF_VALUE: 0
PIF_VALUE: 4
PIF_VALUE: 5
PIF_VALUE: 4
PIF_VALUE: 17
PIF_VALUE: 4
PIF_VALUE: 4

## 2020-07-14 ASSESSMENT — PAIN SCALES - GENERAL
PAINLEVEL_OUTOF10: 0
PAINLEVEL_OUTOF10: 0

## 2020-07-14 ASSESSMENT — PAIN - FUNCTIONAL ASSESSMENT: PAIN_FUNCTIONAL_ASSESSMENT: 0-10

## 2020-07-14 NOTE — ANESTHESIA POSTPROCEDURE EVALUATION
Department of Anesthesiology  Postprocedure Note    Patient: Bria Santo  MRN: [id number]  YOB: 1965  Date of evaluation: 7/14/2020  Time:  1:44 PM     Procedure Summary     Date:  07/14/20 Room / Location:  [address]    Anesthesia Start:  1210 Anesthesia Stop:  0150    Procedure:  PARTIAL VAGINECTOMY WITH ULTRASONIC SCAPEL ([address] W/ LONG HAND PIECE) (N/A Vagina ) Diagnosis:  (VAGINAL LESION)    Surgeon:  Niraj Luna MD Responsible Provider:  Ligia Betancur MD    Anesthesia Type:  general ASA Status:  3          Anesthesia Type: general    Emerson Phase I: Emerson Score: 9    Emerson Phase II:      Last vitals: Reviewed and per EMR flowsheets.        Anesthesia Post Evaluation    Patient location during evaluation: PACU  Patient participation: complete - patient participated  Level of consciousness: awake  Pain score: 3  Airway patency: patent  Nausea & Vomiting: no vomiting and no nausea  Complications: no  Cardiovascular status: blood pressure returned to baseline  Respiratory status: acceptable  Hydration status: euvolemic

## 2020-07-14 NOTE — ANESTHESIA PRE PROCEDURE
N/A 11/8/2019    SUTURE REPAIR CERVICAL CUP performed by Yesika Rubio MD at [address]  2007    COLONOSCOPY  7/25/14    normal    CYSTOSCOPY  7/13/15    Robotic cystocele repair, JORGE, lynx sling, sacrocolpopexy    ENDOMETRIAL ABLATION  2009 ? with bladder sling TVT    ENDOSCOPY, COLON, DIAGNOSTIC  09/15/2017    EGD    HERNIA REPAIR  3413    umbilical    HYSTERECTOMY  12/10/2013    VAGINAL, OVARIES ALSO REMOVED    HYSTERECTOMY, VAGINAL  12/10/2013    RAVH and posterior repair    OTHER SURGICAL HISTORY  11/08/2019    SUTURE REPAIR CERVICAL CUP     GA EGD TRANSORAL BIOPSY SINGLE/MULTIPLE N/A 9/15/2017    EGD BIOPSY performed by Trevor Castillo DO at [address] History:    Social History     Tobacco Use    Smoking status: Never Smoker    Smokeless tobacco: Never Used   Substance Use Topics    Alcohol use: Yes     Alcohol/week: 1.0 standard drinks     Types: 1 Glasses of wine per week     Comment: 1 time per month                                Counseling given: Not Answered      Vital Signs (Current): There were no vitals filed for this visit.                                            BP Readings from Last 3 Encounters:   07/02/20 (!) 130/94   06/16/20 120/80   05/28/20 120/80       NPO Status:                                                                                 BMI:   Wt Readings from Last 3 Encounters:   07/13/20 235 lb (106.6 kg)   07/02/20 245 lb 6.4 oz (111.3 kg)   06/16/20 230 lb (104.3 kg)     There is no height or weight on file to calculate BMI.    CBC:   Lab Results   Component Value Date    WBC 6.6 11/08/2019    RBC 4.96 11/08/2019    HGB 14.0 11/08/2019    HCT 43.4 11/08/2019    MCV 87.5 11/08/2019    RDW 13.5 11/08/2019     11/08/2019       CMP:   Lab Results   Component Value Date     06/03/2019    K 4.4 06/03/2019     06/03/2019    CO2 24 06/03/2019    BUN 18 06/03/2019    CREATININE 0.63 06/03/2019    GFRAA >60 06/03/2019

## 2020-07-14 NOTE — H&P
OB/GYN Pre-Op H&P  [facility]    Patient Name: Kimi Santo     Patient : 1965  Room/Bed: Room/bed info not found  Admission Date/Time: No admission date for patient encounter. Primary Care Physician: oT Rivera MD  MRN: [id number]    Date: 2020  Time: 7:35 AM    The patient was seen in pre-op holding. She is here for partial vaginectomy with ultrasonic scalpel. Patient is a 60year old  who presented for vaginal bleeding and a large area of granulation tissue in the upper right vaginal fornix. The patient has a history of sacral colpopexy and has had complaints of continued bleeding since then. She has a urethrovesical sling. Patient had prior surgical interventions to the area as well as silver nitrate applied on multiple occasions. Patient presents today for surgical management of the granulation tissue of the vaginal cuff. The procedure risks and complications were reviewed. The labs, Consent, and H&P were reviewed and updated. The patient was counseled on the possibility of  the need of a second surgery. The patient voiced understanding and had all of her questions answered. The possibility of incomplete removal of abnormal tissue was discussed. OBSTETRICAL HISTORY:   OB History    Para Term  AB Living   2 2 2 0 0 2   SAB TAB Ectopic Molar Multiple Live Births   0 0 0 0 0 0      # Outcome Date GA Lbr Naresh/2nd Weight Sex Delivery Anes PTL Lv   2 Term     F Vag-Spont      1 Term 12    F Vag-Spont         Obstetric Comments   6 / regular / heavy until ablation / now rarely sees spots only / still has PMS (mood changes)       PAST MEDICAL HISTORY:   has a past medical history of Anxiety, Cystocele, Gastroesophageal reflux disease without esophagitis, Hyperlipidemia, Sleep apnea, Thyroid disease, Urinary incontinence, Uterine fibroid, Vaginal prolapse, Vaginal spotting, and Wears glasses.     PAST SURGICAL HISTORY:   has a past surgical history that includes Cholecystectomy (2007); hernia repair (2008); Endometrial ablation (2009 ?); Breast surgery (Bilateral, 02/2013); Colonoscopy (7/25/14); Cystocopy (7/13/15); Hysterectomy, vaginal (12/10/2013); pr egd transoral biopsy single/multiple (N/A, 9/15/2017); bladder suspension (2016); Endoscopy, colon, diagnostic (09/15/2017); other surgical history (11/08/2019); Cervical Cerclage (N/A, 11/8/2019); and Hysterectomy (12/10/2013). ALLERGIES:  Allergies as of 07/07/2020 - Review Complete 07/02/2020   Allergen Reaction Noted    Cephalexin Rash 10/10/2012    Morphine Itching 12/10/2013    Percocet [oxycodone-acetaminophen] Itching 12/10/2013    Seasonal Other (See Comments) 11/07/2019       MEDICATIONS:  No current facility-administered medications for this encounter. Current Outpatient Medications   Medication Sig Dispense Refill    omeprazole (PRILOSEC) 20 MG delayed release capsule TAKE 1 CAPSULE BY MOUTH ONE TIME A DAY (Patient taking differently: Take 20 mg by mouth Daily ) 90 capsule 3    pravastatin (PRAVACHOL) 40 MG tablet TAKE 1 TABLET BY MOUTH ONE TIME A DAY (Patient taking differently: Take 40 mg by mouth daily ) 90 tablet 3    FLUoxetine (PROZAC) 20 MG capsule TAKE 1 CAPSULE BY MOUTH ONE TIME A DAY (Patient taking differently: Take 20 mg by mouth daily TAKE 1 CAPSULE BY MOUTH ONE TIME A DAY) 90 capsule 3    fluticasone (FLONASE) 50 MCG/ACT nasal spray INHALE ONE SPRAY INTO EACH NOSTRIL DAILY (Patient taking differently: 1 spray by Nasal route daily ) 16 g 3    celecoxib (CELEBREX) 200 MG capsule Take 1 capsule by mouth daily 30 capsule 11    diphenhydrAMINE HCl (BENADRYL ALLERGY PO) Take 1 tablet by mouth as needed      Hormone Cream Base CREA by Does not apply route daily       levothyroxine (SYNTHROID) 112 MCG tablet Take 1 tablet by mouth Daily 90 tablet 3    therapeutic multivitamin-minerals (THERAGRAN-M) tablet Take 1 tablet by mouth daily.        dEgar Cintron MAINTENANCE PACK 10 MCG INST Place 1 insert vaginally twice weekly on monday and thursday. (Patient not taking: Reported on 6/25/2020) 8 each 11       FAMILY HISTORY:  family history includes Arthritis in her maternal grandfather; Breast Cancer (age of onset: 79) in her maternal aunt; Cancer in her father and maternal grandfather; Heart Disease in her father, maternal grandfather, and paternal grandfather; High Blood Pressure in her father; High Cholesterol in her mother; No Known Problems in her brother and sister; Other in her paternal grandfather and paternal grandmother. SOCIAL HISTORY:   reports that she has never smoked. She has never used smokeless tobacco. She reports current alcohol use of about 1.0 standard drinks of alcohol per week. She reports that she does not use drugs.     VITALS:  Vitals:    07/13/20 1335 07/14/20 1109   BP:  131/63   Pulse:  65   Resp:  16   Temp:  97 °F (36.1 °C)   TempSrc:  Temporal   SpO2:  97%   Weight: 235 lb (106.6 kg)    Height: 5' 6\" (1.676 m)                                                                                                                              PHYSICAL EXAM:     Unchanged from Prior H&P  CONSTITUTIONAL:  Alert and oriented, no acute distress  HEAD: normocephalic, atraumatic  EYES: Pupils equal and reactive to light, Extraocular muscles intact, sclera non icteric  ENT: Mucus membranes moist, No otorrhea, no rhinorrhea  NECK:  supple, symmetrical, trachea midline   LUNGS:  Good air movement bilaterally, unlabored respirations, no wheezes or rhonchi  CARDIOVASCULAR: Regular rate and rhythm, no murmurs rubs or gallops  ABDOMEN: soft, non tender, non distended, no rebound or guarding, no hernias, no hepatomegaly, no splenomegly  MUSCULOSKELETAL:  Equal strength bilaterally, normal muscle tone  SKIN: No abscess or rash  NEUROLOGIC:  Cranial nerves 2-12 grossly intact, no focal deficits  PSYCH: affect appropriate  Pelvic Exam: deferred to OR      LAB RESULTS:  Hospital Outpatient Visit on 07/10/2020   Component Date Value Ref Range Status    SARS-CoV-2 07/10/2020        Final    SARS-CoV-2, Rapid 07/10/2020        Final    Source 07/10/2020 . NASOPHARYNGEAL SWAB   Final    SARS-CoV-2, PCR 07/10/2020 Not Detected  Not Detected Final    Comment: (NOTE)  The Aptima SARS-CoV-2 assay is a nucleic acid amplification test  intended for the qualitative detection of RNA from SARS-CoV-2 isolated  and purified from nasopharyngeal (NP), nasal and oropharyngeal (OP)  swab  specimens from patients with signs and symptoms of infection who are  suspected of COVID-19. Results are for the identification of SARS-CoV-2 RNA. The SARS-CoV-2  RNA  is generally detectable in nasopharyngeal and oropharyngeal swabs  during  the acute phase of infection. The Aptima SARS-CoV-2 Assay on the [place] [employer] [employer] system  is  intended for use by laboratory personnel specifically instructed and  trained in the operation of the [place] [employer] system. The  Aptima SARS-CoV-2 assay is only for use under the Food and Drug  Administration Emergency Use Authorization. Testing is limited to  laboratories certified under the Clinical Laboratory Improvement  Amendments of 1988 (CLIA), 42 U. S.C. F¿½657Q, to perform high  complexity  tests. Not Det                           ected:  Not detected does not preclude SARS-CoV-2 infection and should not be  used as the sole basis for patient management decisions. Not detected  results must be combined with clinical observations, patient history,  and epidemiological information. The above 1 analytes were performed by Natasha Payne [facility] Outpatient Visit on 06/26/2020   Component Date Value Ref Range Status    Total CK 06/26/2020 116  26 - 192 U/L Final    TJ 06/26/2020 NEGATIVE  NEGATIVE Final    Comment: This test was run on the [employer] TJ test system.   The system provides ten test   results (HEp-2NA, dsDNA, SSA, SSB, Sm, RNP, Scl-70, Viktoriya-1, Centromere and Histone analytes)   from a single patient sample. A negative TJ screen indicates that the specimen was   negative for all ten markers.  CCP IgG Antibodies 06/26/2020 <1.5  <4.0 U/mL Final    Comment:    Reference Range:  0.0-4.0 U/mL Non Reactive/Negative     >4.0 U/mL Reactive/Positive      Rheumatoid Factor 06/26/2020 <10  <14 IU/mL Final    Sed Rate 06/26/2020 27* 0 - 20 mm Final    Myoglobin 06/26/2020 33  25 - 58 ng/mL Final       DIAGNOSTICS:    EXAMINATION:    PELVIC ULTRASOUND         6/13/2019         TECHNIQUE:    Transabdominal pelvic ultrasound was performed.         COMPARISON:    09/30/2013         HISTORY:    ORDERING SYSTEM PROVIDED HISTORY: Vagina bleeding         History of total hysterectomy, spotting for 8 weeks         FINDINGS:    Uterus and ovaries are not visualized compatible with history of total    hysterectomy.  Under distended urinary bladder is seen.  No discrete    sonographic abnormality is identified with transabdominal scanning.              Impression    Unremarkable exam post hysterectomy. DIAGNOSIS & PLAN:  1. Granulation tissue of vaginal cuff   - Proceed with planned procedure: Partial vaginectomy with ultrasonic scalpel   - Consent signed, on chart. - The patient is ready for transport to the operative suite. Counseling: The patient was counseled on all options both medical and surgical, conservative as well as definitive. She has elected to proceed with the procedure as stated above. The patient was counseled on the procedure. Risks and complications were reviewed in detail. The patients orders, labs, consents have been completed. The history and physical as well as all supporting surgical documentation will be forwarded to the pre-operative holding area. The patient is aware that this procedure may not alleviate her symptoms.  That there may be a necessity for a second surgery and that there may be an incomplete removal of abnormal tissue. The patient was counseled at length about the risks of kimberly Covid-19 during their perioperative period and any recovery window from their procedure. The patient was made aware that kimberly Covid-19  may worsen their prognosis for recovering from their procedure  and lend to a higher morbidity and/or mortality risk. All material risks, benefits, and reasonable alternatives including postponing the procedure were discussed. The patient does wish to proceed with the procedure at this time. Ricci Rivers DO  Ob/Gyn Resident  Pager: [phone]  [address]  7/14/2020, 7:35 AM     Resident Physician Statement  I have discussed the case, including pertinent history and exam findings with the above OB/GYN resident physician. I have personally seen the patient. I agree with the assessment, plan and orders as documented. I have made changes to the above note as needed. I have discussed the case with above named attending. All discharge instructions reviewed with the patient. Patient is in agreement with plan. All questions answered. Juan Luis Joyce DO  Ob/Gyn Resident PGY3  [facility]  7/14/2020 11:40 AM       Attending Physician Statement  I have discussed the care of Lowanda Canavan, including pertinent history and exam findings,  with the resident. I have seen and examined the patient and the key elements of all parts of the encounter have been performed by me. I agree with the assessment, plan and orders as documented by the resident.      Raza Walton MD

## 2020-07-15 LAB — SURGICAL PATHOLOGY REPORT: NORMAL

## 2020-07-15 NOTE — OP NOTE
[address] [facility] [address]                                OPERATIVE REPORT    PATIENT NAME: Nakul Light            :        1965  MED REC NO:   [id number]                             ROOM:  ACCOUNT NO:   [de-identified]                           ADMIT DATE: 2020  PROVIDER:     Andre Borrero MD    DATE OF PROCEDURE:  2020    PREOPERATIVE DIAGNOSIS:  Upper vaginal lesion with vaginal bleeding and  pain. POSTOPERATIVE DIAGNOSES:  Upper vaginal lesion with vaginal bleeding and  pain; and large area of granulation tissue, right upper vagina. PROCEDURE:  Partial vaginectomy with ultrasonic scalpel (Misonix) and  vaginal repair, right upper vagina. SURGEON:  Andre Borrero MD    ASSISTANTS:  Lupillo Clifford PA-C; Khadar Rodriguez DO; and Danette Bustos DO    ANESTHESIA:  General.    ESTIMATED BLOOD LOSS:  10 mL. COMPLICATIONS:  None. PACKS:  None. DRAINS:  None. LINES:  There was a peripheral IV of lactated Ringer's during the  procedure. SPECIAL MEDICATIONS:  Included Cleocin vaginal cream to the vagina  postoperatively. BRIEF HISTORY AND INDICATION FOR OPERATION:  The patient is a  24-year-old  2, para 2 (both vaginal deliveries) female, who  presented with heavy vaginal bleeding and was found to have a large area  of granulation tissue in the upper vagina. The patient did have a  history of having a robotic vaginal colpopexy approximately 4-5 years  ago by Dr. Abdullahi Meraz. The patient had done well and had been  relatively asymptomatic until about a year ago when vaginal bleeding  began. The bleeding was off and on and did follow intercourse. She saw  her gynecologist at that time, Dr. Fanta Arciniega, who \"pulled the stitch out. \"   Because the bleeding continued, he took her to surgery and over-sewed  the area of bleeding; however, within a few months, the bleeding began  again. Because of heavy bleeding, she recently saw Dr. Troy Mcgovern, who noted a  large area of granulation tissue in the right upper vagina. He treated  this with silver nitrate and had her return. The area had actually  increased. He then again treated with silver nitrate. The patient has  continued to have problems; therefore, he has referred her to [address] [place] [address]  Gynecologic-Oncology. The patient was seen by Dr. Mary Welch at [address] [place] [address]  GynecologicOncology on 07/02/2020. At that time, there was a large  patch of granulation tissue which was quite exophytic in nature,  approximately 3 cm x 2 cm, on the right upper vagina. No sutures or  foreign material was noted. There was no purulence or foul-odor noted. The patient had no tenderness on examination. Bimanual exam revealed no  nodularity or other pathology. The patient was noted to have a  moderately large cystocele and a moderately large rectocele. She,  however, is relatively asymptomatic from these. The patient was advised  to have surgical removal of the granulation tissue with an oversewing of  the area in order to promote healing. She agreed, and she was taken to  the operating room on 07/14/2020. DESCRIPTION OF OPERATIVE PROCEDURE:  Under adequate general anesthesia,  the patient was placed in the dorsal lithotomy position and prepped and  draped in the usual fashion. A weighted speculum was placed in the lower vagina, and the Sree  retractors were utilized to view the upper vagina. A large exophytic  flower-like mass was noted in the right upper vagina approximately 3 x  3.5 cm in size. No other pathology was noted. The ultrasonic scalpel  by [employer] with a long handle was utilized with a power setting of 75  and irrigation set at 40 and suction set at 40. The granulation tissue  was gradually removed in its entirety. There was a moderate amount of  bleeding that ensued from the raw surface area.   A puckered area was  noted in the right upper vagina, but no raw mesh was noted. It appeared  that there might have been a suture present, but this was uncertain. We  cleansed the area with Betadine and then over-sewed the puckered area  and the raw surface with a 2-0 PDS suture in a running interlocking  fashion. Clindamycin cream was then placed into the vagina. The patient was awakened and taken to the recovery room in satisfactory  condition. She tolerated the surgery and anesthesia well. Again, blood  loss was approximately 10 mL.         Luisito Grimes MD    D: 07/14/2020 14:21:19       T: 07/14/2020 23:44:09     [email or web address]  Job#: [id number]     Doc#: [id number]    CC:

## 2020-07-16 ENCOUNTER — TELEPHONE (OUTPATIENT)
Dept: GYNECOLOGIC ONCOLOGY | Age: 55
End: 2020-07-16

## 2020-07-16 NOTE — TELEPHONE ENCOUNTER
Dennysm for pt, calling to inquire on her post operative state and to answer any questions or concerns at this time.

## 2020-07-27 ENCOUNTER — PATIENT MESSAGE (OUTPATIENT)
Dept: FAMILY MEDICINE CLINIC | Age: 55
End: 2020-07-27

## 2020-07-27 NOTE — TELEPHONE ENCOUNTER
From: Nell Santo  To: Devang Caldwell MD  Sent: 7/27/2020 2:25 PM EDT  Subject: Non-Urgent Medical Question    Good afternoon! ! I think I have an appointment next week with you! I recently had some blood work done but I don't think that did my yearly labs, cholesterol, thyroid etc. I wanted to make sure that I have orders for those in epic before I show up to the lab!    Thanks in advance,   Adam Traylor

## 2020-07-28 ENCOUNTER — OFFICE VISIT (OUTPATIENT)
Dept: GYNECOLOGIC ONCOLOGY | Age: 55
End: 2020-07-28
Payer: COMMERCIAL

## 2020-07-28 ENCOUNTER — HOSPITAL ENCOUNTER (OUTPATIENT)
Age: 55
Setting detail: SPECIMEN
Discharge: HOME | End: 2020-07-28
Payer: COMMERCIAL

## 2020-07-28 VITALS
HEART RATE: 69 BPM | DIASTOLIC BLOOD PRESSURE: 87 MMHG | BODY MASS INDEX: 40.34 KG/M2 | WEIGHT: 251 LBS | SYSTOLIC BLOOD PRESSURE: 135 MMHG | TEMPERATURE: 97.2 F | HEIGHT: 66 IN

## 2020-07-28 LAB
-: ABNORMAL
AMORPHOUS: ABNORMAL
BACTERIA: ABNORMAL
BILIRUBIN URINE: NEGATIVE
CASTS UA: ABNORMAL /LPF (ref 0–8)
COLOR: YELLOW
COMMENT UA: ABNORMAL
CRYSTALS, UA: ABNORMAL /HPF
EPITHELIAL CELLS UA: ABNORMAL /HPF (ref 0–5)
GLUCOSE URINE: NEGATIVE
KETONES, URINE: NEGATIVE
LEUKOCYTE ESTERASE, URINE: ABNORMAL
MUCUS: ABNORMAL
NITRITE, URINE: POSITIVE
OTHER OBSERVATIONS UA: ABNORMAL
PH UA: 6 (ref 5–8)
PROTEIN UA: ABNORMAL
RBC UA: ABNORMAL /HPF (ref 0–4)
RENAL EPITHELIAL, UA: ABNORMAL /HPF
SPECIFIC GRAVITY UA: 1.02 (ref 1–1.03)
TRICHOMONAS: ABNORMAL
TURBIDITY: ABNORMAL
URINE HGB: ABNORMAL
UROBILINOGEN, URINE: NORMAL
WBC UA: ABNORMAL /HPF (ref 0–5)
YEAST: ABNORMAL

## 2020-07-28 PROCEDURE — 99024 POSTOP FOLLOW-UP VISIT: CPT | Performed by: PHYSICIAN ASSISTANT

## 2020-07-28 PROCEDURE — 81003 URINALYSIS AUTO W/O SCOPE: CPT | Performed by: PHYSICIAN ASSISTANT

## 2020-07-28 RX ORDER — CLINDAMYCIN PHOSPHATE 20 MG/G
CREAM VAGINAL
Qty: 1 TUBE | Refills: 1 | Status: SHIPPED | OUTPATIENT
Start: 2020-07-28 | End: 2020-08-04

## 2020-07-28 NOTE — PROGRESS NOTES
Review of Systems   Genitourinary:         Has a tinge blood at times since surgery. States feels like she may have UTI. Cloudy urine and painful at times. All other systems reviewed and are negative.

## 2020-07-28 NOTE — PATIENT INSTRUCTIONS
1. Plan to return to clinic in 2 weeks  2. Continue use of clindamycin cream at this time  3. Continue to limit physical activity of no heavy lifting or strenuous activity, continue nothing in the vagina including no bathing or pools  4. Will await urine taken today and notify accordingly  5.  Call or return to clinic sooner with questions or concerns

## 2020-07-28 NOTE — PROGRESS NOTES
[address] [facility], [facility], [address] Ciera Gordon is a 60 y.o. female who presents for a Post Operative visit    CC/HPI:  She is a  female who presents today following her surgery of a partial vaginectomy with ultrasonic scalpel on 2020    She has a long standing history of vaginal bleeding and granulation tissue. This has been present since her vaginal colpopexy approximately 4-5 years ago. She has had multiple treatment attempts with silver nitrate as well as biopsies. She was then referred to [place] for further evaluation and treatment options. Discussion was held and decision was made to use ultrasonic scalpel therapy and repair. Therefore she was taken to the OR on the above date for the intended procedure. Final pathology revealed granulation tissue with severe acute and chronic inflammation. Normal squamous epithelium. Negative for dysplasia or malignancy. She did well post operatively and was discharged home on POD #0. She was instructed to use intra vaginal Clindamycin twice daily. Today, she states she is feeling well. She denies abdominal or pelvic pain. She has had scant vaginal spotting. She notes urinary discomfort for 1 week. Normal bowel habits. Denies nausea/vomiting fever or chills. She finished her clindamycin cream 2 nights ago. She continues nothing in the vagina. ROS:  I have personally reviewed and agree with the review of systems done by my ancillary staff in the [facility] documentation. Objective:  Vitals:    20 0906   BP: 135/87   Pulse: 69   Temp: 97.2 °F (36.2 °C)   TempSrc: Temporal   Weight: 251 lb (113.9 kg)   Height: 5' 6\" (1.676 m)       Physical Exam:  General: well-appearing, no acute distress    Pelvic examination with gentle placement reveals the running suture is in place at the midline vagina.  There is no blood in the vaginal canal. There is scant yellow discharge present from the incision. No evidence of infectious process. The surrounding vaginal mucosa is normal in appearance. Assessment:  1. Granulation of vagina, severe    Post Operative Changes:  Stable, continuing to heal    Discussed final pathology results with the patient, and all questions were answered to her satisfaction    Plan  Follow Up Instructions:  Return to clinic in 2 weeks. Will refill Clindamycin cream to continue use at this time    Continue activity restrictions for another 7-8 weeks. Including no heavy lifting straining more than 10 lbs. Encouraged to keep stools soft. Will await urinalysis. Advised nothing in the vagina for at least 2-3 months, will assess healing period along the way. Pt does a fully seated job for work and may return on 8/3 with restrictions as denoted with no heavy lifting or strenuous activity or prolonged standing. Pt will be reassess for restrictions at her next post operative visit.      Electronically signed by Braden Stewart PA-C on 7/28/2020 at 9:10 AM EDT

## 2020-07-29 ENCOUNTER — TELEPHONE (OUTPATIENT)
Dept: GYNECOLOGIC ONCOLOGY | Age: 55
End: 2020-07-29

## 2020-07-29 RX ORDER — NITROFURANTOIN 25; 75 MG/1; MG/1
100 CAPSULE ORAL 2 TIMES DAILY
Qty: 14 CAPSULE | Refills: 0 | Status: SHIPPED | OUTPATIENT
Start: 2020-07-29 | End: 2020-08-03 | Stop reason: ALTCHOICE

## 2020-07-29 NOTE — TELEPHONE ENCOUNTER
Lvm for pt to return call. Urine positive for infection. Culture is still pending. However I did send over an antibiotic to begin taking, Macrobid, and we can modify if needed based on culture. Prescription was sent to [facility] in [place].

## 2020-07-30 ENCOUNTER — APPOINTMENT (OUTPATIENT)
Dept: GENERAL RADIOLOGY | Age: 55
End: 2020-07-30
Payer: COMMERCIAL

## 2020-07-30 ENCOUNTER — OFFICE VISIT (OUTPATIENT)
Dept: PRIMARY CARE CLINIC | Age: 55
End: 2020-07-30
Payer: COMMERCIAL

## 2020-07-30 ENCOUNTER — HOSPITAL ENCOUNTER (OUTPATIENT)
Age: 55
Setting detail: SPECIMEN
Discharge: HOME | End: 2020-07-30
Payer: COMMERCIAL

## 2020-07-30 ENCOUNTER — HOSPITAL ENCOUNTER (EMERGENCY)
Age: 55
Discharge: HOME | End: 2020-07-30
Attending: EMERGENCY MEDICINE
Payer: COMMERCIAL

## 2020-07-30 ENCOUNTER — PATIENT MESSAGE (OUTPATIENT)
Dept: FAMILY MEDICINE CLINIC | Age: 55
End: 2020-07-30

## 2020-07-30 ENCOUNTER — VIRTUAL VISIT (OUTPATIENT)
Dept: FAMILY MEDICINE CLINIC | Age: 55
End: 2020-07-30
Payer: COMMERCIAL

## 2020-07-30 VITALS
OXYGEN SATURATION: 93 % | HEART RATE: 90 BPM | DIASTOLIC BLOOD PRESSURE: 54 MMHG | SYSTOLIC BLOOD PRESSURE: 109 MMHG | WEIGHT: 240 LBS | TEMPERATURE: 100 F | RESPIRATION RATE: 18 BRPM | HEIGHT: 66 IN | BODY MASS INDEX: 38.57 KG/M2

## 2020-07-30 VITALS — TEMPERATURE: 98.9 F | HEART RATE: 98 BPM | OXYGEN SATURATION: 96 %

## 2020-07-30 LAB
ABSOLUTE BANDS #: 0.15 K/UL (ref 0–1)
ABSOLUTE EOS #: 0 K/UL (ref 0–0.4)
ABSOLUTE IMMATURE GRANULOCYTE: ABNORMAL K/UL (ref 0–0.3)
ABSOLUTE LYMPH #: 0.91 K/UL (ref 1–4.8)
ABSOLUTE MONO #: 1.51 K/UL (ref 0.1–1.3)
ANION GAP SERPL CALCULATED.3IONS-SCNC: 16 MMOL/L (ref 9–17)
BANDS: 1 % (ref 0–10)
BASOPHILS # BLD: 0 % (ref 0–2)
BASOPHILS ABSOLUTE: 0 K/UL (ref 0–0.2)
BUN BLDV-MCNC: 10 MG/DL (ref 6–20)
BUN/CREAT BLD: ABNORMAL (ref 9–20)
C-REACTIVE PROTEIN: 61.3 MG/L (ref 0–5)
CALCIUM SERPL-MCNC: 9.2 MG/DL (ref 8.6–10.4)
CHLORIDE BLD-SCNC: 102 MMOL/L (ref 98–107)
CO2: 21 MMOL/L (ref 20–31)
CREAT SERPL-MCNC: 0.78 MG/DL (ref 0.5–0.9)
CULTURE: ABNORMAL
DIFFERENTIAL TYPE: ABNORMAL
EOSINOPHILS RELATIVE PERCENT: 0 % (ref 0–4)
GFR AFRICAN AMERICAN: >60 ML/MIN
GFR NON-AFRICAN AMERICAN: >60 ML/MIN
GFR SERPL CREATININE-BSD FRML MDRD: ABNORMAL ML/MIN/{1.73_M2}
GFR SERPL CREATININE-BSD FRML MDRD: ABNORMAL ML/MIN/{1.73_M2}
GLUCOSE BLD-MCNC: 160 MG/DL (ref 70–99)
HCT VFR BLD CALC: 41.1 % (ref 36–46)
HEMOGLOBIN: 13.5 G/DL (ref 12–16)
IMMATURE GRANULOCYTES: ABNORMAL %
LACTIC ACID: 3.1 MMOL/L (ref 0.5–2.2)
LYMPHOCYTES # BLD: 6 % (ref 24–44)
Lab: ABNORMAL
MCH RBC QN AUTO: 28.2 PG (ref 26–34)
MCHC RBC AUTO-ENTMCNC: 32.9 G/DL (ref 31–37)
MCV RBC AUTO: 85.8 FL (ref 80–100)
MONOCYTES # BLD: 10 % (ref 1–7)
MORPHOLOGY: NORMAL
NRBC AUTOMATED: ABNORMAL PER 100 WBC
PDW BLD-RTO: 13.8 % (ref 11.5–14.9)
PLATELET # BLD: 289 K/UL (ref 150–450)
PLATELET ESTIMATE: ABNORMAL
PMV BLD AUTO: 7.7 FL (ref 6–12)
POTASSIUM SERPL-SCNC: 3.6 MMOL/L (ref 3.7–5.3)
RBC # BLD: 4.79 M/UL (ref 4–5.2)
RBC # BLD: ABNORMAL 10*6/UL
SEG NEUTROPHILS: 83 % (ref 36–66)
SEGMENTED NEUTROPHILS ABSOLUTE COUNT: 12.53 K/UL (ref 1.3–9.1)
SODIUM BLD-SCNC: 139 MMOL/L (ref 135–144)
SPECIMEN DESCRIPTION: ABNORMAL
WBC # BLD: 15.1 K/UL (ref 3.5–11)
WBC # BLD: ABNORMAL 10*3/UL

## 2020-07-30 PROCEDURE — 99213 OFFICE O/P EST LOW 20 MIN: CPT | Performed by: FAMILY MEDICINE

## 2020-07-30 PROCEDURE — 86140 C-REACTIVE PROTEIN: CPT

## 2020-07-30 PROCEDURE — 2580000003 HC RX 258: Performed by: EMERGENCY MEDICINE

## 2020-07-30 PROCEDURE — 80048 BASIC METABOLIC PNL TOTAL CA: CPT

## 2020-07-30 PROCEDURE — 36415 COLL VENOUS BLD VENIPUNCTURE: CPT

## 2020-07-30 PROCEDURE — 85025 COMPLETE CBC W/AUTO DIFF WBC: CPT

## 2020-07-30 PROCEDURE — 99285 EMERGENCY DEPT VISIT HI MDM: CPT

## 2020-07-30 PROCEDURE — 83605 ASSAY OF LACTIC ACID: CPT

## 2020-07-30 PROCEDURE — 71045 X-RAY EXAM CHEST 1 VIEW: CPT

## 2020-07-30 PROCEDURE — 6370000000 HC RX 637 (ALT 250 FOR IP): Performed by: EMERGENCY MEDICINE

## 2020-07-30 RX ORDER — 0.9 % SODIUM CHLORIDE 0.9 %
1000 INTRAVENOUS SOLUTION INTRAVENOUS ONCE
Status: COMPLETED | OUTPATIENT
Start: 2020-07-30 | End: 2020-07-30

## 2020-07-30 RX ORDER — IBUPROFEN 200 MG
400 TABLET ORAL ONCE
Status: COMPLETED | OUTPATIENT
Start: 2020-07-30 | End: 2020-07-30

## 2020-07-30 RX ORDER — SULFAMETHOXAZOLE AND TRIMETHOPRIM 800; 160 MG/1; MG/1
1 TABLET ORAL 2 TIMES DAILY
Qty: 20 TABLET | Refills: 0 | Status: SHIPPED | OUTPATIENT
Start: 2020-07-30 | End: 2020-08-09

## 2020-07-30 RX ADMIN — SODIUM CHLORIDE 1000 ML: 9 INJECTION, SOLUTION INTRAVENOUS at 12:22

## 2020-07-30 RX ADMIN — IBUPROFEN 400 MG: 200 TABLET, FILM COATED ORAL at 12:22

## 2020-07-30 ASSESSMENT — PATIENT HEALTH QUESTIONNAIRE - PHQ9
2. FEELING DOWN, DEPRESSED OR HOPELESS: 0
SUM OF ALL RESPONSES TO PHQ QUESTIONS 1-9: 0
SUM OF ALL RESPONSES TO PHQ QUESTIONS 1-9: 0
SUM OF ALL RESPONSES TO PHQ9 QUESTIONS 1 & 2: 0
1. LITTLE INTEREST OR PLEASURE IN DOING THINGS: 0

## 2020-07-30 ASSESSMENT — PAIN DESCRIPTION - DESCRIPTORS: DESCRIPTORS: ACHING

## 2020-07-30 ASSESSMENT — ENCOUNTER SYMPTOMS
WHEEZING: 0
CHEST TIGHTNESS: 0
RHINORRHEA: 0
COLOR CHANGE: 0
BLOOD IN STOOL: 0
ABDOMINAL PAIN: 0
EYE DISCHARGE: 0
CONSTIPATION: 0
DIARRHEA: 0
WHEEZING: 0
SINUS PRESSURE: 0
FACIAL SWELLING: 0
VOMITING: 0
ABDOMINAL PAIN: 0
TROUBLE SWALLOWING: 0
NAUSEA: 1
SHORTNESS OF BREATH: 1
RHINORRHEA: 0
SHORTNESS OF BREATH: 1
DIARRHEA: 0
EYE PAIN: 0
SORE THROAT: 1
COUGH: 1
BACK PAIN: 0
NAUSEA: 0
SORE THROAT: 1
COUGH: 0
VOMITING: 0
EYE REDNESS: 0

## 2020-07-30 ASSESSMENT — PAIN DESCRIPTION - PAIN TYPE: TYPE: ACUTE PAIN

## 2020-07-30 ASSESSMENT — PAIN DESCRIPTION - ORIENTATION: ORIENTATION: LOWER

## 2020-07-30 ASSESSMENT — PAIN SCALES - GENERAL
PAINLEVEL_OUTOF10: 4
PAINLEVEL_OUTOF10: 4

## 2020-07-30 ASSESSMENT — PAIN DESCRIPTION - LOCATION: LOCATION: BACK

## 2020-07-30 NOTE — PROGRESS NOTES
Visit Information    Have you changed or started any medications since your last visit including any over-the-counter medicines, vitamins, or herbal medicines? no   Have you stopped taking any of your medications? Is so, why? -  no  Are you having any side effects from any of your medications? - no    Have you seen any other physician or provider since your last visit? yes - Dr.s Kim Stout   Have you had any other diagnostic tests since your last visit?  no   Have you been seen in the emergency room and/or had an admission in a hospital since we last saw you?  yes - had OP OR   Have you had your routine dental cleaning in the past 6 months?  yes -      Do you have an active MyChart account? If no, what is the barrier?   Yes    Patient Care Team:  Josefa Saul MD as PCP - General (Family Medicine)  Josefa Saul MD as PCP [facility]  Laisha Mathew MD as Consulting Physician (Obstetrics & Gynecology)  Bradly Mcmahon DO as Obstetrician (Obstetrics & Gynecology)  Kelechi Abdi MD as Consulting Physician (Gynecologic Oncology)  Matthew Araujo PA-C as Physician Assistant (Physician Assistant)    Medical History Review  Past Medical, Family, and Social History reviewed and does not contribute to the patient presenting condition    Health Maintenance   Topic Date Due    Lipid screen  04/11/2020    A1C test (Diabetic or Prediabetic)  06/03/2020    TSH testing  06/03/2020    Hepatitis C screen  10/24/2023 (Originally 1965)    HIV screen  10/24/2023 (Originally 2/7/1980)    Flu vaccine (1) 09/01/2020    Breast cancer screen  12/18/2021    DTaP/Tdap/Td vaccine (2 - Td) 10/24/2023    Colon cancer screen colonoscopy  07/25/2024    Shingles Vaccine  Completed    Hepatitis A vaccine  Aged Out    Hepatitis B vaccine  Aged Out    Hib vaccine  Aged Out    Meningococcal (ACWY) vaccine  Aged Out    Pneumococcal 0-64 years Vaccine  Aged Out     Patient/Caregiver verbalize understanding of medications.   Yes

## 2020-07-30 NOTE — PATIENT INSTRUCTIONS
You were tested for COVID today. We will call you with results when they are available. If you have not heard from us in 7 days, please call our office. Advance Care Planning  People with COVID-19 may have no symptoms, mild symptoms, such as fever, cough, and shortness of breath or they may have more severe illness, developing severe and fatal pneumonia. As a result, Advance Care Planning with attention to naming a health care decision maker (someone you trust to make healthcare decisions for you if you could not speak for yourself) and sharing other health care preferences is important BEFORE a possible health crisis. Please contact your Primary Care Provider to discuss Advance Care Planning. Preventing the Spread of Coronavirus Disease 2019 in Homes and Residential Communities  For the most recent information go to [email or web address]    Prevention steps for People with confirmed or suspected COVID-19 (including persons under investigation) who do not need to be hospitalized  and   People with confirmed COVID-19 who were hospitalized and determined to be medically stable to go home    Your healthcare provider and public health staff will evaluate whether you can be cared for at home. If it is determined that you do not need to be hospitalized and can be isolated at home, you will be monitored by staff from your local or state health department. You should follow the prevention steps below until a healthcare provider or local or state health department says you can return to your normal activities. Stay home except to get medical care  People who are mildly ill with COVID-19 are able to isolate at home during their illness. You should restrict activities outside your home, except for getting medical care. Do not go to work, school, or public areas. Avoid using public transportation, ride-sharing, or taxis.   Separate yourself from other people and animals in your home  People: As much as possible, you should stay in a specific room and away from other people in your home. Also, you should use a separate bathroom, if available. Animals: You should restrict contact with pets and other animals while you are sick with COVID-19, just like you would around other people. Although there have not been reports of pets or other animals becoming sick with COVID-19, it is still recommended that people sick with COVID-19 limit contact with animals until more information is known about the virus. When possible, have another member of your household care for your animals while you are sick. If you are sick with COVID-19, avoid contact with your pet, including petting, snuggling, being kissed or licked, and sharing food. If you must care for your pet or be around animals while you are sick, wash your hands before and after you interact with pets and wear a facemask. Call ahead before visiting your doctor  If you have a medical appointment, call the healthcare provider and tell them that you have or may have COVID-19. This will help the healthcare providers office take steps to keep other people from getting infected or exposed. Wear a facemask  You should wear a facemask when you are around other people (e.g., sharing a room or vehicle) or pets and before you enter a healthcare providers office. If you are not able to wear a facemask (for example, because it causes trouble breathing), then people who live with you should not stay in the same room with you, or they should wear a facemask if they enter your room. Cover your coughs and sneezes  Cover your mouth and nose with a tissue when you cough or sneeze. Throw used tissues in a lined trash can. Immediately wash your hands with soap and water for at least 20 seconds or, if soap and water are not available, clean your hands with an alcohol-based hand  that contains at least 60% alcohol.   Clean your hands often  Wash your hands often with soap and water for at least 20 seconds, especially after blowing your nose, coughing, or sneezing; going to the bathroom; and before eating or preparing food. If soap and water are not readily available, use an alcohol-based hand  with at least 60% alcohol, covering all surfaces of your hands and rubbing them together until they feel dry. Soap and water are the best option if hands are visibly dirty. Avoid touching your eyes, nose, and mouth with unwashed hands. Avoid sharing personal household items  You should not share dishes, drinking glasses, cups, eating utensils, towels, or bedding with other people or pets in your home. After using these items, they should be washed thoroughly with soap and water. Clean all high-touch surfaces everyday  High touch surfaces include counters, tabletops, doorknobs, bathroom fixtures, toilets, phones, keyboards, tablets, and bedside tables. Also, clean any surfaces that may have blood, stool, or body fluids on them. Use a household cleaning spray or wipe, according to the label instructions. Labels contain instructions for safe and effective use of the cleaning product including precautions you should take when applying the product, such as wearing gloves and making sure you have good ventilation during use of the product. Monitor your symptoms  Seek prompt medical attention if your illness is worsening (e.g., difficulty breathing). Before seeking care, call your healthcare provider and tell them that you have, or are being evaluated for, COVID-19. Put on a facemask before you enter the facility. These steps will help the healthcare providers office to keep other people in the office or waiting room from getting infected or exposed. Ask your healthcare provider to call the local or state health department.  Persons who are placed under active monitoring or facilitated self-monitoring should follow instructions provided by their local health department or occupational health professionals, as appropriate. When working with your local health department check their available hours. If you have a medical emergency and need to call 911, notify the dispatch personnel that you have, or are being evaluated for COVID-19. If possible, put on a facemask before emergency medical services arrive. Discontinuing home isolation  Patients with confirmed COVID-19 should remain under home isolation precautions until the risk of secondary transmission to others is thought to be low. The decision to discontinue home isolation precautions should be made on a case-by-case basis, in consultation with healthcare providers and state and local health departments.

## 2020-07-30 NOTE — PROGRESS NOTES
[address]  [address] [address]  Dept: [phone]  Dept Fax: [phone]    Edmond Concepcion is a 60 y.o. female who presents today for her medical conditions/complaintsas noted below. Edmond Concepcion is c/o of Covid Testing (cough, sob, sore throat, body aches, fever x two days )        HPI:     Cough   This is a new problem. The current episode started yesterday. The problem has been unchanged. The problem occurs constantly. The cough is non-productive. Associated symptoms include a fever (102.5F), headaches, myalgias, a sore throat and shortness of breath. Pertinent negatives include no chills, ear congestion, ear pain, nasal congestion, rash, rhinorrhea or wheezing. Nothing aggravates the symptoms. Treatments tried: bactrim-taking now. The treatment provided no relief. There is no history of asthma, COPD or environmental allergies. PMHx sleep apnea, thyroid disease, GERD  Patient had video visit with PCP and went to ER today. Diagnosed with likely COVID. Elevated WBC, CRP, lactic acid. No known sick contacts  Past Medical History:   Diagnosis Date    Anxiety     Cystocele     Gastroesophageal reflux disease without esophagitis 10/24/2018    Hyperlipidemia     Sleep apnea     USES CPAP MACHINE.     Thyroid disease     Urinary incontinence     Uterine fibroid     Vaginal prolapse     Vaginal spotting     Wears glasses     READING    Past medical history reviewed and pertinent positives/negatives in the HPI    Past Surgical History:   Procedure Laterality Date    BLADDER SUSPENSION  2016    BREAST SURGERY Bilateral 02/2013    breast reduction    CERVICAL CERCLAGE N/A 11/8/2019    SUTURE REPAIR CERVICAL CUP performed by Campbell Collins MD at [address]  2007    COLONOSCOPY  7/25/14    normal    CYSTOSCOPY  7/13/15    Robotic cystocele repair, JORGE, lynx sling, sacrocolpopexy    ENDOMETRIAL ABLATION  2009 ? with bladder sling TVT    ENDOSCOPY, COLON, DIAGNOSTIC  09/15/2017    EGD    HERNIA REPAIR  1770    umbilical    HYSTERECTOMY  12/10/2013    VAGINAL, OVARIES ALSO REMOVED    HYSTERECTOMY, VAGINAL  12/10/2013    [address] and posterior repair    UT EGD TRANSORAL BIOPSY SINGLE/MULTIPLE N/A 9/15/2017    EGD BIOPSY performed by Reyna Robbins DO at [facility]  07/14/2020    PARTIAL VAGINECTOMY     VAGINA SURGERY N/A 7/14/2020    PARTIAL VAGINECTOMY WITH ULTRASONIC SCAPEL ([address] W/ LONG HAND PIECE) performed by Shanita Zuniga MD at [facility] History   Problem Relation Age of Onset    High Cholesterol Mother     High Blood Pressure Father     Heart Disease Father         stent    Cancer Father         bladder    Breast Cancer Maternal Aunt 79    Heart Disease Maternal Grandfather     Arthritis Maternal Grandfather     Cancer Maternal Grandfather         bone CA    Other Paternal Grandmother         Leukemia    Other Paternal Grandfather         Leukemia    Heart Disease Paternal Grandfather     No Known Problems Sister     No Known Problems Brother        Social History     Tobacco Use    Smoking status: Never Smoker    Smokeless tobacco: Never Used   Substance Use Topics    Alcohol use: Yes     Alcohol/week: 1.0 standard drinks     Types: 1 Glasses of wine per week     Comment: 1 time per month      Current Outpatient Medications   Medication Sig Dispense Refill    sulfamethoxazole-trimethoprim (BACTRIM DS) 800-160 MG per tablet Take 1 tablet by mouth 2 times daily for 10 days 20 tablet 0    nitrofurantoin, macrocrystal-monohydrate, (MACROBID) 100 MG capsule Take 1 capsule by mouth 2 times daily for 7 days 14 capsule 0    clindamycin (CLEOCIN) 2 % vaginal cream Place vaginally nightly.  1 Tube 1    ibuprofen (ADVIL;MOTRIN) 800 MG tablet Take 1 tablet by mouth every 8 hours as needed for Pain 30 tablet 0    omeprazole (PRILOSEC) 20 MG delayed release capsule TAKE 1 CAPSULE BY MOUTH ONE TIME A DAY (Patient taking differently: Take 20 mg by mouth Daily ) 90 capsule 3    pravastatin (PRAVACHOL) 40 MG tablet TAKE 1 TABLET BY MOUTH ONE TIME A DAY (Patient taking differently: Take 40 mg by mouth daily ) 90 tablet 3    FLUoxetine (PROZAC) 20 MG capsule TAKE 1 CAPSULE BY MOUTH ONE TIME A DAY (Patient taking differently: Take 20 mg by mouth daily TAKE 1 CAPSULE BY MOUTH ONE TIME A DAY) 90 capsule 3    fluticasone (FLONASE) 50 MCG/ACT nasal spray INHALE ONE SPRAY INTO EACH NOSTRIL DAILY (Patient taking differently: 1 spray by Nasal route daily ) 16 g 3    celecoxib (CELEBREX) 200 MG capsule Take 1 capsule by mouth daily 30 capsule 11    diphenhydrAMINE HCl (BENADRYL ALLERGY PO) Take 1 tablet by mouth as needed      Hormone Cream Base CREA by Does not apply route daily       levothyroxine (SYNTHROID) 112 MCG tablet Take 1 tablet by mouth Daily 90 tablet 3    therapeutic multivitamin-minerals (THERAGRAN-M) tablet Take 1 tablet by mouth daily. No current facility-administered medications for this visit.       Allergies   Allergen Reactions    Cephalexin Rash     Rash when in the sun    Morphine Itching    Percocet [Oxycodone-Acetaminophen] Itching    Seasonal Other (See Comments)     POST NASAL DRIP       Health Maintenance   Topic Date Due    Lipid screen  04/11/2020    A1C test (Diabetic or Prediabetic)  06/03/2020    TSH testing  06/03/2020    Hepatitis C screen  10/24/2023 (Originally 1965)    HIV screen  10/24/2023 (Originally 2/[birth year]/1978)    Flu vaccine (1) 09/01/2020    Breast cancer screen  12/18/2021    DTaP/Tdap/Td vaccine (2 - Td) 10/24/2023    Colon cancer screen colonoscopy  07/25/2024    Shingles Vaccine  Completed    Hepatitis A vaccine  Aged Out    Hepatitis B vaccine  Aged Out    Hib vaccine  Aged Out    Meningococcal (ACWY) vaccine  Aged Out    Pneumococcal 0-64 years Vaccine  Aged Out       :      Review of Systems   Constitutional: Positive for fever (102.5F). Negative for chills. HENT: Positive for sore throat. Negative for congestion, ear pain and rhinorrhea. Respiratory: Positive for cough and shortness of breath. Negative for wheezing. Gastrointestinal: Positive for nausea. Negative for abdominal pain, diarrhea and vomiting. Musculoskeletal: Positive for myalgias. Skin: Negative for pallor and rash. Allergic/Immunologic: Negative for environmental allergies. Neurological: Positive for headaches. Hematological: Negative for adenopathy. Objective:   Patient was evaluated [place] today during this pandemic covid 19 situation. Physical Exam  Vitals signs and nursing note reviewed. Constitutional:       Appearance: Normal appearance. She is obese. HENT:      Head: Normocephalic and atraumatic. Right Ear: Hearing normal.      Left Ear: Hearing normal.      Nose: Nose normal.      Mouth/Throat:      Lips: Pink. Mouth: Mucous membranes are moist.      Pharynx: Oropharynx is clear. Eyes:      Extraocular Movements: Extraocular movements intact. Conjunctiva/sclera: Conjunctivae normal.   Neck:      Musculoskeletal: Normal range of motion. Cardiovascular:      Rate and Rhythm: Normal rate and regular rhythm. Heart sounds: Normal heart sounds. Pulmonary:      Effort: Pulmonary effort is normal.      Breath sounds: Normal breath sounds. Skin:     General: Skin is warm and dry. Neurological:      Mental Status: She is alert and oriented to person, place, and time. Mental status is at baseline. Psychiatric:         Mood and Affect: Mood normal.         Behavior: Behavior normal.         Thought Content: Thought content normal.         Judgment: Judgment normal.       Pulse 98   Temp 98.9 °F (37.2 °C) (Temporal)   [facility] 10/06/2013   SpO2 96%     Assessment:       Diagnosis Orders   1. Suspected COVID-19 virus infection  COVID-19 Ambulatory   2.  Cough         Plan: You were tested for COVID today. We will call you with results when they are available. If you have not heard from us in 7 days, please call our office. Advance Care Planning  People with COVID-19 may have no symptoms, mild symptoms, such as fever, cough, and shortness of breath or they may have more severe illness, developing severe and fatal pneumonia. As a result, Advance Care Planning with attention to naming a health care decision maker (someone you trust to make healthcare decisions for you if you could not speak for yourself) and sharing other health care preferences is important BEFORE a possible health crisis. Please contact your Primary Care Provider to discuss Advance Care Planning. Preventing the Spread of Coronavirus Disease 2019 in Homes and Residential Communities  For the most recent information go to [email or web address]    Prevention steps for People with confirmed or suspected COVID-19 (including persons under investigation) who do not need to be hospitalized  and   People with confirmed COVID-19 who were hospitalized and determined to be medically stable to go home    Your healthcare provider and public health staff will evaluate whether you can be cared for at home. If it is determined that you do not need to be hospitalized and can be isolated at home, you will be monitored by staff from your local or state health department. You should follow the prevention steps below until a healthcare provider or local or state health department says you can return to your normal activities. Stay home except to get medical care  People who are mildly ill with COVID-19 are able to isolate at home during their illness. You should restrict activities outside your home, except for getting medical care. Do not go to work, school, or public areas. Avoid using public transportation, ride-sharing, or taxis.   Separate yourself from other people and animals in your home  People: As much as possible, you should stay in a specific room and away from other people in your home. Also, you should use a separate bathroom, if available. Animals: You should restrict contact with pets and other animals while you are sick with COVID-19, just like you would around other people. Although there have not been reports of pets or other animals becoming sick with COVID-19, it is still recommended that people sick with COVID-19 limit contact with animals until more information is known about the virus. When possible, have another member of your household care for your animals while you are sick. If you are sick with COVID-19, avoid contact with your pet, including petting, snuggling, being kissed or licked, and sharing food. If you must care for your pet or be around animals while you are sick, wash your hands before and after you interact with pets and wear a facemask. Call ahead before visiting your doctor  If you have a medical appointment, call the healthcare provider and tell them that you have or may have COVID-19. This will help the healthcare providers office take steps to keep other people from getting infected or exposed. Wear a facemask  You should wear a facemask when you are around other people (e.g., sharing a room or vehicle) or pets and before you enter a healthcare providers office. If you are not able to wear a facemask (for example, because it causes trouble breathing), then people who live with you should not stay in the same room with you, or they should wear a facemask if they enter your room. Cover your coughs and sneezes  Cover your mouth and nose with a tissue when you cough or sneeze. Throw used tissues in a lined trash can. Immediately wash your hands with soap and water for at least 20 seconds or, if soap and water are not available, clean your hands with an alcohol-based hand  that contains at least 60% alcohol.   Clean your hands often  Wash your hands often with soap and water for at least 20 seconds, especially after blowing your nose, coughing, or sneezing; going to the bathroom; and before eating or preparing food. If soap and water are not readily available, use an alcohol-based hand  with at least 60% alcohol, covering all surfaces of your hands and rubbing them together until they feel dry. Soap and water are the best option if hands are visibly dirty. Avoid touching your eyes, nose, and mouth with unwashed hands. Avoid sharing personal household items  You should not share dishes, drinking glasses, cups, eating utensils, towels, or bedding with other people or pets in your home. After using these items, they should be washed thoroughly with soap and water. Clean all high-touch surfaces everyday  High touch surfaces include counters, tabletops, doorknobs, bathroom fixtures, toilets, phones, keyboards, tablets, and bedside tables. Also, clean any surfaces that may have blood, stool, or body fluids on them. Use a household cleaning spray or wipe, according to the label instructions. Labels contain instructions for safe and effective use of the cleaning product including precautions you should take when applying the product, such as wearing gloves and making sure you have good ventilation during use of the product. Monitor your symptoms  Seek prompt medical attention if your illness is worsening (e.g., difficulty breathing). Before seeking care, call your healthcare provider and tell them that you have, or are being evaluated for, COVID-19. Put on a facemask before you enter the facility. These steps will help the healthcare providers office to keep other people in the office or waiting room from getting infected or exposed. Ask your healthcare provider to call the local or state health department.  Persons who are placed under active monitoring or facilitated self-monitoring should follow instructions provided by their local health department or occupational health professionals, as appropriate. When working with your local health department check their available hours. If you have a medical emergency and need to call 911, notify the dispatch personnel that you have, or are being evaluated for COVID-19. If possible, put on a facemask before emergency medical services arrive. Discontinuing home isolation  Patients with confirmed COVID-19 should remain under home isolation precautions until the risk of secondary transmission to others is thought to be low. The decision to discontinue home isolation precautions should be made on a case-by-case basis, in consultation with healthcare providers and state and local health departments. Orders Placed This Encounter   Procedures    COVID-19 Ambulatory     Oropharyngeal     Standing Status:   Future     Standing Expiration Date:   7/30/2021     Scheduling Instructions:      Saline media preferred given current shortage of viral transport media but both acceptable     Order Specific Question:   Status     Answer:   Symptomatic/Infection Suspected     No orders of the defined types were placed in this encounter. Patient given educational materials - see patient instructions. Discussed use, benefit, and side effects of prescribed medications. All patient questions answered. Pt voiced understanding. Patient agreed with treatment plan. Follow up as directed.      Electronicallysigned by Srinath Tyler MD on 7/30/2020 at 3:18 PM

## 2020-07-30 NOTE — PROGRESS NOTES
SUBJECTIVE:   Lena Huizar is a 60 y.o. female who complains of coryza, congestion, post nasal drip, headache, bilateral sinus pain and fever for 3 days. She denies a history of anorexia, chest pain, chills, dizziness, fatigue, myalgias, nausea, shortness of breath, sweats, vomiting, weakness, wheezing and cough. She denies a history of asthma. Patient does not smoke cigarettes. Pt is currently on Macrobid for a UTI. Pt is 2 weeks s/p partial vaginectomy. OBJECTIVE:  Appearance: alert, well appearing, and in no distress. Pulmonary: Normal effort; talking in complete sentences    ASSESSMENT:   Acute sinusitis    PLAN:  Per orders - Bactrim as ordered  Stop the Macrobid  Symptomatic therapy suggested: push fluids, rest and use acetaminophen, ibuprofen, OTC NS, and an antihistamine-decongestant of choice prn. Call or return to clinic prn if these symptoms worsen or fail to improve as anticipated. Lena Huizar is a 60 y.o. female being evaluated by a Virtual Visit (video visit) encounter to address concerns as mentioned above. A caregiver was present when appropriate. Due to this being a TeleHealth encounter (During [facility]-19 public health emergency), evaluation of the following organ systems was limited: Vitals/Constitutional/EENT/Resp/CV/GI//MS/Neuro/Skin/Heme-Lymph-Imm. Pursuant to the emergency declaration under the [address] [place] [address], [address] [place] [address] and the [employer] Act, this Virtual Visit was conducted with patient's (and/or legal guardian's) consent, to reduce the patient's risk of exposure to COVID-19 and provide necessary medical care. The patient (and/or legal guardian) has also been advised to contact this office for worsening conditions or problems, and seek emergency medical treatment and/or call 911 if deemed necessary.      Patient identification was verified at the start of the visit: Yes    Total time spent for this encounter: Not billed by time    Services were provided through a video synchronous discussion virtually to substitute for in-person clinic visit. Patient and provider were located at their individual homes. --Ruthann Meyer MD on 7/30/2020 at 9:42 AM    An electronic signature was used to authenticate this note.

## 2020-07-30 NOTE — ED PROVIDER NOTES
[address] ED  eMERGENCY dEPARTMENT eNCOUnter      Pt Name: Santiago Solomon  MRN: [id number]  [place] 1965  Date of evaluation: 7/30/20      CHIEF COMPLAINT       Chief Complaint   Patient presents with    Back Pain    Fever    Shortness of Breath    Urinary Tract Infection         HISTORY OF PRESENT ILLNESS    Santiago Solomon is a 60 y.o. female who presents complaining of shortness of breath. Patient has been getting treated for UTI for the last few days. Patient states that she has been having fevers and started developing shortness of breath. Patient states she gets very winded when she is moving. Patient has had headaches sore throat. Patient has had myalgias. Patient denies cough. Patient has had no recent ill contacts that she knows of and she does wear her mask. REVIEW OF SYSTEMS       Review of Systems   Constitutional: Negative for activity change, appetite change, chills, diaphoresis and fever. HENT: Positive for congestion and sore throat. Negative for ear pain, facial swelling, nosebleeds, rhinorrhea, sinus pressure and trouble swallowing. Eyes: Negative for pain, discharge and redness. Respiratory: Positive for shortness of breath. Negative for cough, chest tightness and wheezing. Cardiovascular: Negative for chest pain, palpitations and leg swelling. Gastrointestinal: Negative for abdominal pain, blood in stool, constipation, diarrhea, nausea and vomiting. Genitourinary: Negative for difficulty urinating, dysuria, flank pain, frequency, genital sores and hematuria. Musculoskeletal: Positive for myalgias. Negative for arthralgias, back pain, gait problem, joint swelling and neck pain. Skin: Negative for color change, pallor, rash and wound. Neurological: Positive for headaches. Negative for dizziness, tremors, seizures, syncope, speech difficulty, weakness and numbness.    Psychiatric/Behavioral: Negative for confusion, decreased concentration, hallucinations, self-injury, sleep disturbance and suicidal ideas. PAST MEDICAL HISTORY     Past Medical History:   Diagnosis Date    Anxiety     Cystocele     Gastroesophageal reflux disease without esophagitis 10/24/2018    Hyperlipidemia     Sleep apnea     USES CPAP MACHINE.  Thyroid disease     Urinary incontinence     Uterine fibroid     Vaginal prolapse     Vaginal spotting     Wears glasses     READING       SURGICAL HISTORY       Past Surgical History:   Procedure Laterality Date    BLADDER SUSPENSION  2016    BREAST SURGERY Bilateral 02/2013    breast reduction    CERVICAL CERCLAGE N/A 11/8/2019    SUTURE REPAIR CERVICAL CUP performed by Trina Ramirez MD at [address]  2007    COLONOSCOPY  7/25/14    normal    CYSTOSCOPY  7/13/15    Robotic cystocele repair, JORGE, lynx sling, sacrocolpopexy    ENDOMETRIAL ABLATION  2009 ?     with bladder sling TVT    ENDOSCOPY, COLON, DIAGNOSTIC  09/15/2017    EGD    HERNIA REPAIR  6228    umbilical    HYSTERECTOMY  12/10/2013    VAGINAL, OVARIES ALSO REMOVED    HYSTERECTOMY, VAGINAL  12/10/2013    RAVH and posterior repair    HI EGD TRANSORAL BIOPSY SINGLE/MULTIPLE N/A 9/15/2017    EGD BIOPSY performed by Lea Marcial DO at [address] [place] [address]  07/14/2020    PARTIAL VAGINECTOMY     VAGINA SURGERY N/A 7/14/2020    PARTIAL VAGINECTOMY WITH ULTRASONIC SCAPEL ([address]/ LONG HAND PIECE) performed by Lori Simmons MD at [facility]       Current Discharge Medication List      CONTINUE these medications which have NOT CHANGED    Details   sulfamethoxazole-trimethoprim (BACTRIM DS) 800-160 MG per tablet Take 1 tablet by mouth 2 times daily for 10 days  Qty: 20 tablet, Refills: 0      nitrofurantoin, macrocrystal-monohydrate, (MACROBID) 100 MG capsule Take 1 capsule by mouth 2 times daily for 7 days  Qty: 14 capsule, Refills: 0    Associated Diagnoses: Urinary frequency clindamycin (CLEOCIN) 2 % vaginal cream Place vaginally nightly. Qty: 1 Tube, Refills: 1    Associated Diagnoses: Granulation tissue of vaginal cuff      ibuprofen (ADVIL;MOTRIN) 800 MG tablet Take 1 tablet by mouth every 8 hours as needed for Pain  Qty: 30 tablet, Refills: 0      omeprazole (PRILOSEC) 20 MG delayed release capsule TAKE 1 CAPSULE BY MOUTH ONE TIME A DAY  Qty: 90 capsule, Refills: 3      pravastatin (PRAVACHOL) 40 MG tablet TAKE 1 TABLET BY MOUTH ONE TIME A DAY  Qty: 90 tablet, Refills: 3      FLUoxetine (PROZAC) 20 MG capsule TAKE 1 CAPSULE BY MOUTH ONE TIME A DAY  Qty: 90 capsule, Refills: 3    Associated Diagnoses: Anxiety      fluticasone (FLONASE) 50 MCG/ACT nasal spray INHALE ONE SPRAY INTO EACH NOSTRIL DAILY  Qty: 16 g, Refills: 3      celecoxib (CELEBREX) 200 MG capsule Take 1 capsule by mouth daily  Qty: 30 capsule, Refills: 11      diphenhydrAMINE HCl (BENADRYL ALLERGY PO) Take 1 tablet by mouth as needed      Hormone Cream Base CREA by Does not apply route daily       levothyroxine (SYNTHROID) 112 MCG tablet Take 1 tablet by mouth Daily  Qty: 90 tablet, Refills: 3    Comments: Please fill the 90 days and not the 30 days      therapeutic multivitamin-minerals (THERAGRAN-M) tablet Take 1 tablet by mouth daily. ALLERGIES     is allergic to cephalexin; morphine; percocet [oxycodone-acetaminophen]; and seasonal.    SOCIAL HISTORY      reports that she has never smoked. She has never used smokeless tobacco. She reports current alcohol use of about 1.0 standard drinks of alcohol per week. She reports that she does not use drugs. PHYSICAL EXAM     INITIAL VITALS: BP (!) 109/54   Pulse 90   Temp 100 °F (37.8 °C) (Oral)   Resp 18   Ht 5' 6\" (1.676 m)   Wt 240 lb (108.9 kg)   LMP 10/06/2013   SpO2 93%   BMI 38.74 kg/m²      Physical Exam  Vitals signs and nursing note reviewed. Constitutional:       General: She is not in acute distress.      Appearance: She is well-developed. She is not diaphoretic. HENT:      Head: Normocephalic and atraumatic. Eyes:      General: No scleral icterus. Right eye: No discharge. Left eye: No discharge. Conjunctiva/sclera: Conjunctivae normal.      Pupils: Pupils are equal, round, and reactive to light. Cardiovascular:      Rate and Rhythm: Normal rate and regular rhythm. Heart sounds: Normal heart sounds. No murmur. No friction rub. No gallop. Pulmonary:      Effort: Pulmonary effort is normal. No respiratory distress. Breath sounds: Normal breath sounds. No wheezing or rales. Chest:      Chest wall: No tenderness. Abdominal:      General: Bowel sounds are normal. There is no distension. Palpations: Abdomen is soft. There is no mass. Tenderness: There is no abdominal tenderness. There is no guarding or rebound. Musculoskeletal: Normal range of motion. General: No tenderness. Skin:     General: Skin is warm and dry. Coloration: Skin is not pale. Findings: No erythema or rash. Neurological:      Mental Status: She is alert and oriented to person, place, and time. Cranial Nerves: No cranial nerve deficit. Sensory: No sensory deficit. Motor: No abnormal muscle tone. Coordination: Coordination normal.      Deep Tendon Reflexes: Reflexes normal.   Psychiatric:         Behavior: Behavior normal.         Thought Content: Thought content normal.         Judgment: Judgment normal.         DIAGNOSTIC RESULTS     RADIOLOGY:All plain film, CT,MRI, and formal ultrasound images (except ED bedside ultrasound) are read by the radiologist and the interpretations are directly viewed by the emergency physician.    Xr Chest Portable    Result Date: 7/30/2020  EXAMINATION: ONE XRAY VIEW OF THE CHEST 7/30/2020 12:33 pm COMPARISON: July 14, 2020 HISTORY: ORDERING SYSTEM PROVIDED HISTORY: shortness of breath TECHNOLOGIST PROVIDED HISTORY: shortness of breath Reason for Exam: Fever Acuity: Acute Type of Exam: Initial Additional signs and symptoms: shortness of breath Acute shortness of breath. Initial encounter. FINDINGS: The cardiomediastinal silhouette is within normal range. Lungs are clear. There is no focal pulmonary consolidation, pleural effusion, pneumothorax, or evidence of airspace pulmonary edema. 1. No radiographic finding to account for patient's shortness of breath. LABS: All lab results were reviewed by myself, and all abnormals are listed below. Labs Reviewed   CBC WITH AUTO DIFFERENTIAL - Abnormal; Notable for the following components:       Result Value    WBC 15.1 (*)     Seg Neutrophils 83 (*)     Lymphocytes 6 (*)     Monocytes 10 (*)     Segs Absolute 12.53 (*)     Absolute Lymph # 0.91 (*)     Absolute Mono # 1.51 (*)     All other components within normal limits   C-REACTIVE PROTEIN - Abnormal; Notable for the following components:    CRP 61.3 (*)     All other components within normal limits   BASIC METABOLIC PANEL - Abnormal; Notable for the following components:    Glucose 160 (*)     Potassium 3.6 (*)     All other components within normal limits   LACTIC ACID - Abnormal; Notable for the following components:    Lactic Acid 3.1 (*)     All other components within normal limits         MEDICAL DECISION MAKING:     Patient symptoms are concerning for possible coronavirus but she looks otherwise well and even while walking in she did drop her sats a little bit but did not become hypoxic therefore I do not believe she will need to be admitted. We will do a small work-up to make sure she does have pneumonia.       EMERGENCY DEPARTMENT COURSE:   Vitals:    Vitals:    07/30/20 1217 07/30/20 1335   BP: 136/84 (!) 109/54   Pulse: 120 90   Resp: 17 18   Temp: 102.8 °F (39.3 °C) 100 °F (37.8 °C)   TempSrc: Oral Oral   SpO2: 97% 93%   Weight: 240 lb (108.9 kg)    Height: 5' 6\" (1.676 m)        The patient was given the following medications while in the emergency department:  Orders Placed This Encounter   Medications    0.9 % sodium chloride bolus    ibuprofen (ADVIL;MOTRIN) tablet 400 mg       -------------------------  1:49 PM EDT  Patient has been updated on results and will be discharged home to follow-up for further testing. CONSULTS:  None    PROCEDURES:  None    FINAL IMPRESSION      1.  Suspected COVID-19 virus infection          DISPOSITION/PLAN   DISPOSITION Decision To Discharge 07/30/2020 01:48:52 PM      PATIENT REFERREDTO:  Dino Hebert MD  [address]  [place] [address] [place] [phone]    In 1 week      [facility] ED  [facility], [facility] [address]  [address] [place] [address]  [phone]    If symptoms worsen      DISCHARGEMEDICATIONS:  Current Discharge Medication List          (Please note that portions of this note were completed with a voice recognition program.  Efforts were made to edit thedictations but occasionally words are mis-transcribed.)    Eun Cunha MD  Attending Emergency Physician                        Eun Cunha MD  07/30/20 2002

## 2020-07-30 NOTE — TELEPHONE ENCOUNTER
From: Donta Santo  To: Raudel Cole MD  Sent: 7/30/2020 3:17 AM EDT  Subject: Non-Urgent Medical Question    Chrissy Ortega this may emergent. I started running a low grade fever and body aches yesterday. Slight sore throat, headache , itchy ears and nausea. Fast forward the temp has been rising through the night. 102.9 hasn't come down with alternating Motrin and Tylenol. I also took a zofran for the nausea. .. that's a bit better. No coughing just feel like I have a head cold. I had surgery with dr Paulino Delatorre on the 14th . Went for a after surgery appointment on Tuesday the 28th. Any way I feel like I've been hit by a truck. Let me know what I should do!  Thank you!!

## 2020-07-31 ENCOUNTER — CARE COORDINATION (OUTPATIENT)
Dept: CARE COORDINATION | Age: 55
End: 2020-07-31

## 2020-08-02 LAB — SARS-COV-2, NAA: NOT DETECTED

## 2020-08-03 ENCOUNTER — TELEMEDICINE (OUTPATIENT)
Dept: FAMILY MEDICINE CLINIC | Age: 55
End: 2020-08-03
Payer: COMMERCIAL

## 2020-08-03 PROCEDURE — 99214 OFFICE O/P EST MOD 30 MIN: CPT | Performed by: FAMILY MEDICINE

## 2020-08-03 RX ORDER — IBUPROFEN 800 MG/1
800 TABLET ORAL
Qty: 90 TABLET | Refills: 1 | Status: SHIPPED | OUTPATIENT
Start: 2020-08-03 | End: 2021-01-04

## 2020-08-03 RX ORDER — LEVOTHYROXINE SODIUM 112 UG/1
112 TABLET ORAL DAILY
Qty: 90 TABLET | Refills: 3 | Status: SHIPPED | OUTPATIENT
Start: 2020-08-03 | End: 2021-09-30 | Stop reason: DRUGHIGH

## 2020-08-03 ASSESSMENT — ENCOUNTER SYMPTOMS
DIARRHEA: 0
RHINORRHEA: 0
CONSTIPATION: 0
VOMITING: 0
SHORTNESS OF BREATH: 0
ABDOMINAL DISTENTION: 0
CHEST TIGHTNESS: 0
SORE THROAT: 0
COUGH: 0
BACK PAIN: 0
NAUSEA: 0
ABDOMINAL PAIN: 0

## 2020-08-03 NOTE — CARE COORDINATION
Calls to patient for COVID-19 monitoring due to   ED visit 7/30/20. Patient unavailable. Messages   Left requesting return call to [phone].

## 2020-08-03 NOTE — PROGRESS NOTES
Tere Chavez MD    [address] [place] [address]  [phone]  [address] [address]  Dept: [phone]  Dept Fax: [phone]     Patient ID: Santiago Solomon is a 60 y.o. female. HPI    Pt here today for f/u on chronic medical problems HLD, Hyperglycemia, Hypothyroidism, Anxiety, GERD,go over labs and/or diagnostic studies, and medication refills. Pt denies any fever or chills. Pt today denies any HA, chest pain, or SOB. Pt denies any N/V/D/C or abdominal pain. Pt did do a VV last week for a fever and congestion. She was being treated for a UTI with Macrobid and I switched her to Bactrim to cover URI and UTI. She proceeded to go to the ER the same day for worsening symptoms and diagnosed with \"most likely Covid\" and sent her for testing and her Covid test was negative. She states the fever finally broke over the weekend. She is about 3 weeks s/o partial vaginectomy. She states they did put her on Ibuprofen post-op and she states she felt wonderful with her myalgias while on the Ibuprofen and worked better than the Celebrex. Pt with occasional heartburn, but stable on meds. Pt states mood is stable on meds. Otherwise pt doing well on current tx and no other concerns today. The patient's past medical, surgical, social, and family history as well as his current medications and allergies were reviewed as documented in today's encounter. Current Outpatient Medications on File Prior to Visit   Medication Sig Dispense Refill    sulfamethoxazole-trimethoprim (BACTRIM DS) 800-160 MG per tablet Take 1 tablet by mouth 2 times daily for 10 days 20 tablet 0    clindamycin (CLEOCIN) 2 % vaginal cream Place vaginally nightly.  1 Tube 1    ibuprofen (ADVIL;MOTRIN) 800 MG tablet Take 1 tablet by mouth every 8 hours as needed for Pain 30 tablet 0    omeprazole (PRILOSEC) 20 MG delayed release capsule TAKE 1 CAPSULE BY MOUTH ONE TIME A DAY distress. Appearance: Normal appearance. She is not ill-appearing or toxic-appearing. Pulmonary:      Effort: Pulmonary effort is normal. No tachypnea, accessory muscle usage or respiratory distress. Comments: Patient able to talk in full sentences without difficulty   Neurological:      General: No focal deficit present. Mental Status: She is alert and oriented to person, place, and time. Psychiatric:         Mood and Affect: Mood normal.         Speech: Speech normal.         Behavior: Behavior normal. Behavior is cooperative. Labs reviewed with pt today. Assessment:      Diagnosis Orders   1. Mixed hyperlipidemia     2. Hyperglycemia     3. Hypothyroidism, unspecified type     4. Anxiety     5. Gastroesophageal reflux disease without esophagitis     6. Myalgia      improved         Plan:        Orders Placed This Encounter   Medications    ibuprofen (ADVIL;MOTRIN) 800 MG tablet     Sig: Take 1 tablet by mouth 3 times daily (with meals)     Dispense:  90 tablet     Refill:  1    levothyroxine (SYNTHROID) 112 MCG tablet     Sig: Take 1 tablet by mouth Daily     Dispense:  90 tablet     Refill:  3     Please fill the 90 days and not the 30 days     Will cont with low fat/chol diet and Pravachol as ordered    Continue to watch carbs secondary to increased Triglycerides and BS    Will stop the Celebrex and put her on the Ibuprofen 800mg and take 2-3 times per day prn which has really helped with her myalgias. Will have her cont with the Omeprazole for GI prophylaxis    Will cont with the Prozac as she is doing well    Will have her get her labs ordered at her previous visit and back in 6 weeks    Rest of systems unchanged, continue current treatments. Medications, labs, diagnostic studies, consultations and follow-up as documented in this encounter.  Rest of systems unchanged, continue current treatments    Lowanda Canavan is a 60 y.o. female being evaluated by a Virtual Visit (video visit) encounter to address concerns as mentioned above. A caregiver was present when appropriate. Due to this being a TeleHealth encounter (During [facility]-25 public health emergency), evaluation of the following organ systems was limited: Vitals/Constitutional/EENT/Resp/CV/GI//MS/Neuro/Skin/Heme-Lymph-Imm. Pursuant to the emergency declaration under the [address] [place] [address] and the [employer] Act, this Virtual Visit was conducted with patient's (and/or legal guardian's) consent, to reduce the patient's risk of exposure to COVID-19 and provide necessary medical care. The patient (and/or legal guardian) has also been advised to contact this office for worsening conditions or problems, and seek emergency medical treatment and/or call 911 if deemed necessary. Patient identification was verified at the start of the visit: Yes    Total time spent for this encounter: Not billed by time    Services were provided through a video synchronous discussion virtually to substitute for in-person clinic visit. Patient and provider were located at their individual homes. --Clemente Perales MD on 8/3/2020 at 10:05 AM    An electronic signature was used to authenticate this note. Tere Franco MD

## 2020-08-03 NOTE — PROGRESS NOTES
Visit Information    Have you changed or started any medications since your last visit including any over-the-counter medicines, vitamins, or herbal medicines? no   Have you stopped taking any of your medications? Is so, why? -  no  Are you having any side effects from any of your medications? - no    Have you seen any other physician or provider since your last visit?  no   Have you had any other diagnostic tests since your last visit? yes - in ER   Have you been seen in the emergency room and/or had an admission in a hospital since we last saw you?  yes - ER   Have you had your routine dental cleaning in the past 6 months?  yes -      Do you have an active MyChart account? If no, what is the barrier?   Yes    Patient Care Team:  Manuel Rodriguez MD as PCP - General (Family Medicine)  Manuel Rodriguez MD as PCP - [facility]  Trina Ramirez MD as Consulting Physician (Obstetrics & Gynecology)  Layla Villegas DO as Obstetrician (Obstetrics & Gynecology)  Lori Simmons MD as Consulting Physician (Gynecologic Oncology)  Louie Galvez PA-C as Physician Assistant (Physician Assistant)  Wilver Phan RD, LD as Dietitian    Medical History Review  Past Medical, Family, and Social History reviewed and does not contribute to the patient presenting condition    Health Maintenance   Topic Date Due    Lipid screen  04/11/2020    A1C test (Diabetic or Prediabetic)  06/03/2020    TSH testing  06/03/2020    Hepatitis C screen  10/24/2023 (Originally 1965)    HIV screen  10/24/2023 (Originally 2/7/1980)    Flu vaccine (1) 09/01/2020    Breast cancer screen  12/18/2021    DTaP/Tdap/Td vaccine (2 - Td) 10/24/2023    Colon cancer screen colonoscopy  07/25/2024    Shingles Vaccine  Completed    Hepatitis A vaccine  Aged Out    Hepatitis B vaccine  Aged Out    Hib vaccine  Aged Out    Meningococcal (ACWY) vaccine  Aged Out    Pneumococcal 0-64 years Vaccine  Aged Out     Patient/Caregiver verbalize understanding of medications.   Yes

## 2020-08-14 ENCOUNTER — OFFICE VISIT (OUTPATIENT)
Dept: GYNECOLOGIC ONCOLOGY | Age: 55
End: 2020-08-14

## 2020-08-14 VITALS
BODY MASS INDEX: 40.51 KG/M2 | TEMPERATURE: 96.8 F | WEIGHT: 251 LBS | HEART RATE: 71 BPM | SYSTOLIC BLOOD PRESSURE: 130 MMHG | DIASTOLIC BLOOD PRESSURE: 86 MMHG

## 2020-08-14 PROCEDURE — 99024 POSTOP FOLLOW-UP VISIT: CPT | Performed by: PHYSICIAN ASSISTANT

## 2020-08-14 ASSESSMENT — ENCOUNTER SYMPTOMS
GASTROINTESTINAL NEGATIVE: 1
RESPIRATORY NEGATIVE: 1

## 2020-08-14 NOTE — PROGRESS NOTES
Review of Systems   Constitutional: Negative. HENT:  Negative. Eyes:        Reading Glasses   Respiratory: Negative. Cardiovascular: Negative. Gastrointestinal: Negative. Endocrine: Negative. Genitourinary: Negative. Musculoskeletal: Negative. Skin: Negative. Neurological: Negative. Hematological: Negative. Psychiatric/Behavioral: Negative.

## 2020-08-14 NOTE — PATIENT INSTRUCTIONS
1. Return to clinic in 4 weeks for post operative check  2. Continue pelvic rest including no heavy lifting more than 10 lbs or strenuous activity. Continue nothing in the vagina for at least 4 weeks. May continue to work with restrictions as stated above. Will notify your employer. 3. Finish Macrobid antibiotic, if urinary concerns arise please notify office and we will need to repeat a urinalysis and culture  4.  Call or return to clinic sooner with questions or concerns

## 2020-08-17 ENCOUNTER — TELEPHONE (OUTPATIENT)
Dept: GYNECOLOGIC ONCOLOGY | Age: 55
End: 2020-08-17

## 2020-08-17 NOTE — TELEPHONE ENCOUNTER
Lvm requesting she call and requests return to work with restriction form along with with [employer] for disabilty form faxed to our office to be completed.

## 2020-08-18 NOTE — TELEPHONE ENCOUNTER
Spoke to pt she states she has requested forms to be faxed office for completion. She does not need any other letters in the time being. She will f/u in a day or so to ensure they were sent.

## 2020-09-02 ENCOUNTER — HOSPITAL ENCOUNTER (OUTPATIENT)
Age: 55
Setting detail: SPECIMEN
Discharge: HOME | End: 2020-09-02
Payer: COMMERCIAL

## 2020-09-02 LAB
ALBUMIN SERPL-MCNC: 4.2 G/DL (ref 3.5–5.2)
ALBUMIN/GLOBULIN RATIO: 1.6 (ref 1–2.5)
ALP BLD-CCNC: 66 U/L (ref 35–104)
ALT SERPL-CCNC: 21 U/L (ref 5–33)
ANION GAP SERPL CALCULATED.3IONS-SCNC: 12 MMOL/L (ref 9–17)
AST SERPL-CCNC: 15 U/L
BILIRUB SERPL-MCNC: 0.19 MG/DL (ref 0.3–1.2)
BUN BLDV-MCNC: 19 MG/DL (ref 6–20)
BUN/CREAT BLD: ABNORMAL (ref 9–20)
CALCIUM SERPL-MCNC: 9.4 MG/DL (ref 8.6–10.4)
CHLORIDE BLD-SCNC: 106 MMOL/L (ref 98–107)
CHOLESTEROL/HDL RATIO: 5.6
CHOLESTEROL: 245 MG/DL
CO2: 24 MMOL/L (ref 20–31)
CREAT SERPL-MCNC: 0.7 MG/DL (ref 0.5–0.9)
GFR AFRICAN AMERICAN: >60 ML/MIN
GFR NON-AFRICAN AMERICAN: >60 ML/MIN
GFR SERPL CREATININE-BSD FRML MDRD: ABNORMAL ML/MIN/{1.73_M2}
GFR SERPL CREATININE-BSD FRML MDRD: ABNORMAL ML/MIN/{1.73_M2}
GLUCOSE BLD-MCNC: 104 MG/DL (ref 70–99)
HCT VFR BLD CALC: 42.4 % (ref 36.3–47.1)
HDLC SERPL-MCNC: 44 MG/DL
HEMOGLOBIN: 13.2 G/DL (ref 11.9–15.1)
LDL CHOLESTEROL: 164 MG/DL (ref 0–130)
MCH RBC QN AUTO: 27.9 PG (ref 25.2–33.5)
MCHC RBC AUTO-ENTMCNC: 31.1 G/DL (ref 28.4–34.8)
MCV RBC AUTO: 89.6 FL (ref 82.6–102.9)
NRBC AUTOMATED: 0 PER 100 WBC
PDW BLD-RTO: 14.3 % (ref 11.8–14.4)
PLATELET # BLD: 337 K/UL (ref 138–453)
PMV BLD AUTO: 10.1 FL (ref 8.1–13.5)
POTASSIUM SERPL-SCNC: 4.7 MMOL/L (ref 3.7–5.3)
RBC # BLD: 4.73 M/UL (ref 3.95–5.11)
SODIUM BLD-SCNC: 142 MMOL/L (ref 135–144)
THYROXINE, FREE: 1.2 NG/DL (ref 0.93–1.7)
TOTAL PROTEIN: 6.8 G/DL (ref 6.4–8.3)
TRIGL SERPL-MCNC: 185 MG/DL
TSH SERPL DL<=0.05 MIU/L-ACNC: 1.21 MIU/L (ref 0.3–5)
VLDLC SERPL CALC-MCNC: ABNORMAL MG/DL (ref 1–30)
WBC # BLD: 6.3 K/UL (ref 3.5–11.3)

## 2020-09-03 LAB
ESTIMATED AVERAGE GLUCOSE: 131 MG/DL
HBA1C MFR BLD: 6.2 % (ref 4–6)

## 2020-09-11 ENCOUNTER — OFFICE VISIT (OUTPATIENT)
Dept: GYNECOLOGIC ONCOLOGY | Age: 55
End: 2020-09-11

## 2020-09-11 VITALS — HEART RATE: 61 BPM | SYSTOLIC BLOOD PRESSURE: 148 MMHG | DIASTOLIC BLOOD PRESSURE: 94 MMHG | TEMPERATURE: 96.4 F

## 2020-09-11 PROCEDURE — 99024 POSTOP FOLLOW-UP VISIT: CPT | Performed by: OBSTETRICS & GYNECOLOGY

## 2020-09-11 NOTE — PROGRESS NOTES
Review of Systems   Genitourinary: Positive for vaginal bleeding (spotting increased over last week). All other systems reviewed and are negative.

## 2020-09-11 NOTE — PROGRESS NOTES
[address] [address], [id number] Ciera Funez is a 60 y.o. female who presents for a Post Operative visit today following her surgery of 7/14/2020    HPI:  Whitney Villagran is a 57-year-old female who was referred because of a large amount of granulation tissue in the upper vagina. She had a history of having a robotic vaginal sacral colpopexy approximately 4 to 5 years ago. The patient did well initially but became symptomatic about a year ago with vaginal bleeding. The bleeding was off and on and did follow intercourse. She saw her gynecologist at that time Dr. Isabella Christine who \"pulled a stitch out\". The bleeding continued and Dr. Manjit Castrejon took her to surgery and oversewed the area of bleeding and granulation. However the bleeding began again several months later. Recently because of heavier bleeding she saw Dr. Bandar Nguyen. He noted the large area of granulation tissue in the right upper vagina and treated this with silver nitrate. She returned several weeks later however the area had actually increased in size. She was retreated again with the silver nitrate. She has continued to have problems. Dr. Poornima Chiang therefore referred her to Dr. Paulett Collet at [facility] gynecologic oncology and she was seen on 7/2/2020. A large patch of exophytic granulation tissue was noted approximately 3 cm x 2 cm in the right upper vagina. There was no evidence of sutures or foreign material.  There was no purulence or foul odor. The patient had no nodularity or other pathology noted on bimanual examination. She did have a moderately large cystocele and rectocele but has been asymptomatic as far as those go. Surgical removal of the granulation tissue with an oversewing of the area and vaginal repair was suggested in order to promote better healing. She agreed. She was taken to the operating room on 7/14/2020.   At that time a partial upper vaginectomy with ultrasonic scalpel was performed along with a vaginal repair of the right upper vagina. The patient did well postoperatively and has been seen back on one occasion on 8/14/2020 by Terri Escobedo PA-C. Good but incomplete healing was noted at that time. The PDS sutures were still in place and there was still a moderate amount of induration present. She was asked to return a month later and comes in today for her second postoperative checkup. She states she has not been performing any heavy lifting. She has not been having any intercourse. She has noted no bleeding. She has no pain. She has had no discharge. ROS:  I have personally reviewed and agree with the review of systems done by my ancillary staff in the [facility] documentation. Objective:  Vitals:    09/11/20 1530 09/11/20 1549   BP: (!) 156/91 (!) 148/94   Pulse: 61    Temp: 96.4 °F (35.8 °C)    TempSrc: Temporal        Physical Exam:  General: Patient is in no distress. Pelvic examination: Normal external genitalia. No lesions are seen. Normal-appearing urethra, lower vagina, perineum and anus. A medium size lighted speculum was placed. There was no blood or discharge noted in the vaginal vault. The right upper and upper mid vagina was visualized and the PDS sutures are still in place. They appear to be softening somewhat however. Healing appears fairly good. Sutures were left in place. Bimanual exam reveals only a minimal amount of induration at the wound site. Assessment:  Good postoperative healing thus far. Plan: I would like to see the patient back again in another month. If sutures are still present we will remove them at that time. If any granulation tissue is present we will probably excise it at that time. Follow Up Instructions: Patient may return to work at this time. She should continue to avoid sexual intercourse until her next visit.         Electronically signed by Divina Lynne MD on 9/11/20 at 3:50 PM EDT

## 2020-09-18 ENCOUNTER — TELEPHONE (OUTPATIENT)
Dept: FAMILY MEDICINE CLINIC | Age: 55
End: 2020-09-18

## 2020-09-18 ENCOUNTER — TELEMEDICINE (OUTPATIENT)
Dept: FAMILY MEDICINE CLINIC | Age: 55
End: 2020-09-18
Payer: COMMERCIAL

## 2020-09-18 PROCEDURE — 99214 OFFICE O/P EST MOD 30 MIN: CPT | Performed by: FAMILY MEDICINE

## 2020-09-18 RX ORDER — ATORVASTATIN CALCIUM 20 MG/1
20 TABLET, FILM COATED ORAL DAILY
Qty: 90 TABLET | Refills: 3 | Status: SHIPPED | OUTPATIENT
Start: 2020-09-18 | End: 2021-09-20

## 2020-09-18 ASSESSMENT — ENCOUNTER SYMPTOMS
SHORTNESS OF BREATH: 0
ABDOMINAL DISTENTION: 0
BACK PAIN: 0
ABDOMINAL PAIN: 0
RHINORRHEA: 0
VOMITING: 0
COUGH: 0
CONSTIPATION: 0
DIARRHEA: 0
SORE THROAT: 0
NAUSEA: 0
CHEST TIGHTNESS: 0

## 2020-09-18 NOTE — PROGRESS NOTES
Visit Information    Have you changed or started any medications since your last visit including any over-the-counter medicines, vitamins, or herbal medicines? no   Have you stopped taking any of your medications? Is so, why? -  no  Are you having any side effects from any of your medications? - no    Have you seen any other physician or provider since your last visit? yes - Dr. Bryant Martin   Have you had any other diagnostic tests since your last visit?  no   Have you been seen in the emergency room and/or had an admission in a hospital since we last saw you?  no   Have you had your routine dental cleaning in the past 6 months?  yes -     Do you have an active MyChart account? If no, what is the barrier?   Yes    Patient Care Team:  Viktoriya Harrison MD as PCP - General (Family Medicine)  Viktoriya Harrison MD as PCP - [facility]  Cami Angeles MD as Consulting Physician (Obstetrics & Gynecology)  Stacy Truong DO as Obstetrician (Obstetrics & Gynecology)  Sari Castleman, MD as Consulting Physician (Gynecologic Oncology)  Andrae Pack PA-C as Physician Assistant (Physician Assistant)    Medical History Review  Past Medical, Family, and Social History reviewed and does not contribute to the patient presenting condition    Health Maintenance   Topic Date Due    Flu vaccine (1) 09/01/2020    Hepatitis C screen  10/24/2023 (Originally 1965)    HIV screen  10/24/2023 (Originally 2/7/1980)    A1C test (Diabetic or Prediabetic)  09/02/2021    Lipid screen  09/02/2021    TSH testing  09/02/2021    Breast cancer screen  12/18/2021    DTaP/Tdap/Td vaccine (2 - Td) 10/24/2023    Colon cancer screen colonoscopy  07/25/2024    Shingles Vaccine  Completed    Hepatitis A vaccine  Aged Out    Hepatitis B vaccine  Aged Out    Hib vaccine  Aged Out    Meningococcal (ACWY) vaccine  Aged Out    Pneumococcal 0-64 years Vaccine  Aged Out

## 2020-09-18 NOTE — PROGRESS NOTES
Tere Estrada MD    [address] [place] [address]  [id number]  [address] [address]  Dept: [phone]  Dept Fax: [phone]     Patient ID: Pricila Gordon is a 60 y.o. female. HPI    Pt here today for f/u on chronic medical problems HLD, Hyperglycemia, Hypothyroidism, GERD, Anxiety,go over labs and/or diagnostic studies, and medication refills. Pt denies any fever or chills. Pt today denies any HA, chest pain, or SOB. Pt denies any N/V/D/C or abdominal pain. Pt states mood is stable on meds. Pt with occasional heartburn, but stable on meds. Otherwise pt doing well on current tx and no other concerns today. The patient's past medical, surgical, social, and family history as well as his current medications and allergies were reviewed as documented in today's encounter. Current Outpatient Medications on File Prior to Visit   Medication Sig Dispense Refill    levothyroxine (SYNTHROID) 112 MCG tablet Take 1 tablet by mouth Daily 90 tablet 3    ibuprofen (ADVIL;MOTRIN) 800 MG tablet Take 1 tablet by mouth every 8 hours as needed for Pain 30 tablet 0    omeprazole (PRILOSEC) 20 MG delayed release capsule TAKE 1 CAPSULE BY MOUTH ONE TIME A DAY (Patient taking differently: Take 20 mg by mouth Daily ) 90 capsule 3    FLUoxetine (PROZAC) 20 MG capsule TAKE 1 CAPSULE BY MOUTH ONE TIME A DAY (Patient taking differently: Take 20 mg by mouth daily TAKE 1 CAPSULE BY MOUTH ONE TIME A DAY) 90 capsule 3    fluticasone (FLONASE) 50 MCG/ACT nasal spray INHALE ONE SPRAY INTO EACH NOSTRIL DAILY (Patient taking differently: 1 spray by Nasal route daily ) 16 g 3    diphenhydrAMINE HCl (BENADRYL ALLERGY PO) Take 1 tablet by mouth as needed      Hormone Cream Base CREA by Does not apply route daily       therapeutic multivitamin-minerals (THERAGRAN-M) tablet Take 1 tablet by mouth daily.         ibuprofen (ADVIL;MOTRIN) 800 MG tablet Take 1 tablet by mouth 3 times daily (with meals) 90 tablet 1     No current facility-administered medications on file prior to visit. Subjective:     Review of Systems   Constitutional: Negative for appetite change, fatigue and fever. HENT: Negative for congestion, ear pain, rhinorrhea and sore throat. Respiratory: Negative for cough, chest tightness and shortness of breath. Cardiovascular: Negative for chest pain and palpitations. Gastrointestinal: Negative for abdominal distention, abdominal pain, constipation, diarrhea, nausea and vomiting. Occ heartburn   Genitourinary: Negative for difficulty urinating and dysuria. Musculoskeletal: Negative for arthralgias, back pain and myalgias. Skin: Negative for rash. Neurological: Negative for dizziness, weakness, light-headedness and headaches. Hematological: Negative for adenopathy. Psychiatric/Behavioral: Negative for behavioral problems and sleep disturbance. The patient is not nervous/anxious (stable). Objective:     Physical Exam  Vitals signs reviewed. Constitutional:       General: She is not in acute distress. Appearance: She is well-developed. HENT:      Head: Normocephalic and atraumatic. Right Ear: External ear normal.      Left Ear: External ear normal.      Nose: Nose normal.      Mouth/Throat:      Pharynx: No oropharyngeal exudate. Eyes:      Conjunctiva/sclera: Conjunctivae normal.      Pupils: Pupils are equal, round, and reactive to light. Neck:      Musculoskeletal: Normal range of motion. Cardiovascular:      Rate and Rhythm: Normal rate and regular rhythm. Heart sounds: Normal heart sounds. No murmur. Pulmonary:      Effort: Pulmonary effort is normal. No respiratory distress. Breath sounds: Normal breath sounds. No wheezing. Chest:      Chest wall: No tenderness. Abdominal:      General: Bowel sounds are normal. There is no distension. Palpations: Abdomen is soft. There is no mass. Tenderness: There is no abdominal tenderness. Musculoskeletal: Normal range of motion. General: No tenderness. Lymphadenopathy:      Cervical: No cervical adenopathy. Skin:     Findings: No rash. Neurological:      Mental Status: She is alert and oriented to person, place, and time. Deep Tendon Reflexes: Reflexes are normal and symmetric. Psychiatric:         Behavior: Behavior normal.     Labs reviewed with pt today. Assessment:      Diagnosis Orders   1. Mixed hyperlipidemia     2. Hypothyroidism, unspecified type     3. Hyperglycemia     4. Gastroesophageal reflux disease without esophagitis     5. Anxiety           Plan:        Orders Placed This Encounter   Medications    atorvastatin (LIPITOR) 20 MG tablet     Sig: Take 1 tablet by mouth daily     Dispense:  90 tablet     Refill:  3     Will cont with low fat/chol diet and stop the Pravachol and will start the Lipitor 20mg daily as ordered    Continue to watch carbs secondary to increased Triglycerides and BS    Will cont with the Prozac as prescribed as she is doing well    Rest of systems unchanged, continue current treatments. Medications, labs, diagnostic studies, consultations and follow-up as documented in this encounter. Rest of systems unchanged, continue current treatments    Yin Sim is a 60 y.o. female being evaluated by a Virtual Visit (video visit) encounter to address concerns as mentioned above. A caregiver was present when appropriate. Due to this being a TeleHealth encounter (During TURVN-57 public health emergency), evaluation of the following organ systems was limited: Vitals/Constitutional/EENT/Resp/CV/GI//MS/Neuro/Skin/Heme-Lymph-Imm.   Pursuant to the emergency declaration under the [facility], [facility]5 waiver authority and the [employer] and [employer] Act, this Virtual Visit was conducted with patient's (and/or legal guardian's) consent, to reduce the patient's risk of exposure to COVID-19 and provide necessary medical care. The patient (and/or legal guardian) has also been advised to contact this office for worsening conditions or problems, and seek emergency medical treatment and/or call 911 if deemed necessary. Patient identification was verified at the start of the visit: Yes    Total time spent for this encounter: Not billed by time    Services were provided through a video synchronous discussion virtually to substitute for in-person clinic visit. Patient and provider were located at their individual homes. --Stephanie Handley MD on 9/18/2020 at 9:50 AM    An electronic signature was used to authenticate this note. Tere Mar MD

## 2020-10-08 ENCOUNTER — TELEPHONE (OUTPATIENT)
Dept: GYNECOLOGIC ONCOLOGY | Age: 55
End: 2020-10-08

## 2020-10-08 NOTE — TELEPHONE ENCOUNTER
Please call pt and offer her a sooner appt with me on Monday or Tuesday next week if she is amendable, thanks!

## 2020-10-13 ENCOUNTER — TELEPHONE (OUTPATIENT)
Dept: GYNECOLOGIC ONCOLOGY | Age: 55
End: 2020-10-13

## 2020-10-13 NOTE — TELEPHONE ENCOUNTER
LVM for pt to return call to modify her appt date and time given scheduling changes, she may be seen today later this afternoon or Wednesday in [facility]

## 2020-10-16 NOTE — TELEPHONE ENCOUNTER
Pt needs to have Hormone Base Cream refilled.   Would like called to [facility] at [phone]

## 2020-10-30 ENCOUNTER — HOSPITAL ENCOUNTER (OUTPATIENT)
Age: 55
Setting detail: SPECIMEN
Discharge: HOME | End: 2020-10-30
Payer: COMMERCIAL

## 2020-10-30 ENCOUNTER — OFFICE VISIT (OUTPATIENT)
Dept: GYNECOLOGIC ONCOLOGY | Age: 55
End: 2020-10-30
Payer: COMMERCIAL

## 2020-10-30 VITALS
OXYGEN SATURATION: 98 % | BODY MASS INDEX: 39.57 KG/M2 | DIASTOLIC BLOOD PRESSURE: 90 MMHG | TEMPERATURE: 96.3 F | HEART RATE: 59 BPM | WEIGHT: 246.2 LBS | HEIGHT: 66 IN | SYSTOLIC BLOOD PRESSURE: 161 MMHG

## 2020-10-30 LAB
-: ABNORMAL
AMORPHOUS: ABNORMAL
BACTERIA: ABNORMAL
BILIRUBIN URINE: NEGATIVE
CASTS UA: ABNORMAL /LPF (ref 0–8)
COLOR: YELLOW
COMMENT UA: ABNORMAL
CRYSTALS, UA: ABNORMAL /HPF
EPITHELIAL CELLS UA: ABNORMAL /HPF (ref 0–5)
GLUCOSE URINE: NEGATIVE
KETONES, URINE: NEGATIVE
LEUKOCYTE ESTERASE, URINE: ABNORMAL
MUCUS: ABNORMAL
NITRITE, URINE: NEGATIVE
OTHER OBSERVATIONS UA: ABNORMAL
PH UA: 6.5 (ref 5–8)
PROTEIN UA: NEGATIVE
RBC UA: ABNORMAL /HPF (ref 0–4)
RENAL EPITHELIAL, UA: ABNORMAL /HPF
SPECIFIC GRAVITY UA: 1.01 (ref 1–1.03)
TRICHOMONAS: ABNORMAL
TURBIDITY: CLEAR
URINE HGB: ABNORMAL
UROBILINOGEN, URINE: NORMAL
WBC UA: ABNORMAL /HPF (ref 0–5)
YEAST: ABNORMAL

## 2020-10-30 PROCEDURE — 99213 OFFICE O/P EST LOW 20 MIN: CPT | Performed by: PHYSICIAN ASSISTANT

## 2020-10-30 PROCEDURE — 81003 URINALYSIS AUTO W/O SCOPE: CPT | Performed by: PHYSICIAN ASSISTANT

## 2020-10-30 NOTE — PATIENT INSTRUCTIONS
1. Avoid intercourse for additional 3-4 weeks then may resume in a gradual fashion  2. Referral placed to Dr. Moises Parra office to re-establish care to discuss symptoms  3. Advised to recheck blood pressure at home and notify PCP if elevation above 14/90 persists   4.  Return to clinic as needed in the future

## 2020-10-30 NOTE — PROGRESS NOTES
[facility], [id number] Ciera Muro is a 60 y.o. female who presents for a Post Operative visit    CC/HPI:  She is a  female who presents today following her surgery of a partial vaginectomy with ultrasonic scalpel on 2020. Her surgical history is pertinent for a robotic vaginal colpopexy in 2015 with Dr. Moises John urogynecology, she did well initially however she began to be symptomatic with vaginal bleeding over 1 year ago. She then followed with her local OB/GYN. She has had a stitch removed from the vagina that was felt to be related to her vaginal repair, and she has been taken to the operating room for vaginal repair to oversew the regulation tissue in the past as well. However most recently the patient followed with Dr. Sana Rodriguez and it was noted that she did have a large amount of granulation tissue in the right upper vagina this has been treated with silver nitrate. She then returned weeks later and the granulated area had increased, it appears she had an adverse reaction to the silver nitrate. She was retreated with silver nitrate and was referred to [facility] oncology for further evaluation and treatment. Pt was seen by [facility] on 2020 and examination was performed and it was felt that there was a \"large patch of exophytic granulation tissue noted approximately 3 cm x 2 cm in the right upper vagina there is no evidence of suture or foreign material\". Therefore discussion was held and she was taken to the OR on 2020 for a partial upper vaginectomy with ultrasonic scalpel and vaginal repair. Final pathology was submitted and revealed granulation tissue with severe acute and chronic inflammation. Normal squamous epithelium. Negative for dysplasia or malignancy. She did well post operatively and was discharged home on POD #0.  She was instructed to use intravaginal Clindamycin twice daily. She followed up on 8/14/2020 for a post operative visit, she was noted to be healing at this time there is still evidence of a suture present as well. She then followed up on 9/11/2020 for a postoperative check, it was noted on physical exam that the PDS suture was still in place. Healing was noted to be fairly good at this time. She was advised to follow-up today for a repeat pelvic examination and assessment of healing. Today, she is generally doing well. She has noted daily very light vaginal spotting. She denies vaginal discharge or odor. She does describe urinary \"pressure and some increased frequency\". She denies hematuria or dysuria. She denies fever or chills. She has been abstaining with vaginal intercourse and remaining on pelvic rest. She denies chest pain or shortness of breath. No headache or vision changes. ROS:  I have personally reviewed and agree with the review of systems done by my ancillary staff in the [facility] documentation. Objective:  Vitals:    10/30/20 1507 10/30/20 1539   BP: (!) 149/85 (!) 161/90   Pulse: 59    Temp: 96.3 °F (35.7 °C)    TempSrc: Temporal    SpO2: 98%    Weight: 246 lb 3.2 oz (111.7 kg)    Height: 5' 6\" (1.676 m)        Physical Exam:  General: well-appearing, no acute distress    Pelvic examination with medium size speculum reveals the PDS suture is in place at the midline vagina, it appears to be softened and loose. There is no blood in the vaginal canal. There is no discharge present in the vaginal canal, no evidence of infectious process. The surrounding vaginal mucosa is normal in appearance. The PDS suture was then removed with ease. There was a very minute area of granulation tissue still present at the posterior vagina this is less then 3 mm in size. It appears the vaginal repair is continuing to heal appropriately. Assessment:  1. Hx granulation of vagina, severe    2.  S/p partial vaginectomy ultrasonic scalpel and vaginal repair    Post Operative Changes:  Stable, healing appropriately    Plan  Follow Up Instructions: Advised to avoid sexual intercourse for another 3 to 4 weeks to allow for prolonged healing. Will await urinalysis taken today and notify pt accordingly. Referral has been placed to urogynecology Dr. Ramonita Zavala to reestablish care, for vaginal prolapse is a patient is seeking intervention. Advised to recheck blood pressure in a controlled environment and notify PCP if continued elevation or symptoms develop. She may return as needed in the future if symptoms worsen or recur in the future we did discuss the possibility of repeating the ultrasonic scalpel procedure as pt seemed to do quite well with this treatment.      Electronically signed by Oneil Navarro PA-C on 10/30/2020 at 3:32 PM EDT

## 2020-10-30 NOTE — PROGRESS NOTES
Review of Systems   Genitourinary: Positive for difficulty urinating and vaginal bleeding (spotting the same as before). Pressure and urgency for a couple of hours once a week   All other systems reviewed and are negative.

## 2020-11-01 LAB
CULTURE: ABNORMAL
Lab: ABNORMAL
SPECIMEN DESCRIPTION: ABNORMAL

## 2020-11-02 ENCOUNTER — TELEPHONE (OUTPATIENT)
Dept: GYNECOLOGIC ONCOLOGY | Age: 55
End: 2020-11-02

## 2020-11-02 RX ORDER — NITROFURANTOIN 25; 75 MG/1; MG/1
100 CAPSULE ORAL 2 TIMES DAILY
Qty: 14 CAPSULE | Refills: 0 | Status: SHIPPED | OUTPATIENT
Start: 2020-11-02 | End: 2020-11-09

## 2020-11-02 RX ORDER — NITROFURANTOIN 25; 75 MG/1; MG/1
100 CAPSULE ORAL 2 TIMES DAILY
Qty: 14 CAPSULE | Refills: 0 | Status: SHIPPED | OUTPATIENT
Start: 2020-11-02 | End: 2020-11-02

## 2020-11-16 ENCOUNTER — PATIENT MESSAGE (OUTPATIENT)
Dept: GYNECOLOGIC ONCOLOGY | Age: 55
End: 2020-11-16

## 2020-11-16 RX ORDER — CEPHALEXIN 500 MG/1
500 CAPSULE ORAL 2 TIMES DAILY
Qty: 10 CAPSULE | Refills: 0 | Status: SHIPPED | OUTPATIENT
Start: 2020-11-16 | End: 2020-12-10 | Stop reason: SDUPTHER

## 2020-11-16 NOTE — TELEPHONE ENCOUNTER
From: [facility]  To: Jeanne Jones PA-C  Sent: 11/16/2020 2:54 PM EST  Subject: Prescription Question    I finished all of my antibiotics and now I feel like I have a uti again! Can you call in another one? Please let me know. .. starting to feel miserable again.  Thank you,   Julia Hoffman

## 2020-11-16 NOTE — TELEPHONE ENCOUNTER
Called and spoke to pt regarding urine culture and sensitivity, more susceptible to cephalexin. Assessed pt mediation allergy, pt states she can tolerate keflex however did have one isolated side effect of a rash when taking medication on vacation and in extreme sun. Will send rx over and advised pt if symptoms continue she will need to f/u with repeat urinalysis for culture. Pt voiced understanding.

## 2020-12-01 ENCOUNTER — VIRTUAL VISIT (OUTPATIENT)
Dept: FAMILY MEDICINE CLINIC | Age: 55
End: 2020-12-01
Payer: COMMERCIAL

## 2020-12-01 ENCOUNTER — PATIENT MESSAGE (OUTPATIENT)
Dept: FAMILY MEDICINE CLINIC | Age: 55
End: 2020-12-01

## 2020-12-01 PROCEDURE — 99213 OFFICE O/P EST LOW 20 MIN: CPT | Performed by: FAMILY MEDICINE

## 2020-12-01 RX ORDER — MULTIVIT-MIN/IRON/FOLIC ACID/K 18-600-40
CAPSULE ORAL
COMMUNITY
End: 2021-12-13 | Stop reason: DRUGHIGH

## 2020-12-01 RX ORDER — ASCORBIC ACID 500 MG
500 TABLET ORAL DAILY
COMMUNITY

## 2020-12-01 RX ORDER — ZINC GLUCONATE 50 MG
50 TABLET ORAL DAILY
COMMUNITY
End: 2022-09-19

## 2020-12-01 ASSESSMENT — ENCOUNTER SYMPTOMS
DIARRHEA: 0
SHORTNESS OF BREATH: 0
ABDOMINAL DISTENTION: 0
SORE THROAT: 0
VOMITING: 0
CONSTIPATION: 0
BACK PAIN: 0
COUGH: 1
NAUSEA: 0
CHEST TIGHTNESS: 0
RHINORRHEA: 0
ABDOMINAL PAIN: 0

## 2020-12-01 NOTE — TELEPHONE ENCOUNTER
From: Bettie Santo  To: Lorri Batres MD  Sent: 12/1/2020 11:27 AM EST  Subject: Non-Urgent Medical Question    I need to get released to go back to work on Monday, Dec 7. Can I do a virtual visit to accomplish this?

## 2020-12-01 NOTE — LETTER
NOTIFICATION RETURN TO WORK / SCHOOL    12/1/2020    Ms. Rhoda Santo  [address] [address]      To Whom It May Concern:    Rhoda Santo was tested for COVID-19 on 11/23, and the result was positive. She has self quarantined for 14 days. She may return to work on 12/7. I recommend:return without restrictions    If there are questions or concerns, please have the patient contact our office.         Sincerely,      Evan Soriano MD

## 2020-12-01 NOTE — PROGRESS NOTES
Tere Contreras MD  [address] [facility]  [id number]  [address] [address]  Dept: [phone]  Dept Fax: [phone]     Patient ID: Leita Gaucher is a 60 y.o. female. HPI    Pt here today for an acute visit secondary to covid infection and needs a RTW note. She came down with symptoms on 11/19 and was tested on 12/23 and came back positive. She did also have a rapid test on 11/25 and also came back positive. She is feeling much better, but still with a cough and congestion. Pt denies any fever or chills. Pt today denies any HA, chest pain, or SOB. Pt denies any N/V/D/C or abdominal pain. Otherwise pt doing well on current tx and no other concerns today. The patient's past medical, surgical, social, and family history as well as his current medications and allergies were reviewed as documented in today's encounter.       Current Outpatient Medications on File Prior to Visit   Medication Sig Dispense Refill    vitamin C (ASCORBIC ACID) 500 MG tablet Take 500 mg by mouth daily      zinc gluconate 50 MG tablet Take 50 mg by mouth daily      Cholecalciferol (VITAMIN D) 50 MCG (2000 UT) CAPS capsule Take by mouth      atorvastatin (LIPITOR) 20 MG tablet Take 1 tablet by mouth daily 90 tablet 3    ibuprofen (ADVIL;MOTRIN) 800 MG tablet Take 1 tablet by mouth 3 times daily (with meals) 90 tablet 1    levothyroxine (SYNTHROID) 112 MCG tablet Take 1 tablet by mouth Daily 90 tablet 3    omeprazole (PRILOSEC) 20 MG delayed release capsule TAKE 1 CAPSULE BY MOUTH ONE TIME A DAY (Patient taking differently: Take 20 mg by mouth Daily ) 90 capsule 3    FLUoxetine (PROZAC) 20 MG capsule TAKE 1 CAPSULE BY MOUTH ONE TIME A DAY (Patient taking differently: Take 20 mg by mouth daily TAKE 1 CAPSULE BY MOUTH ONE TIME A DAY) 90 capsule 3    fluticasone (FLONASE) 50 MCG/ACT nasal spray INHALE ONE SPRAY INTO EACH NOSTRIL DAILY (Patient taking differently: 1 spray by Nasal route daily ) 16 g 3    diphenhydrAMINE HCl (BENADRYL ALLERGY PO) Take 1 tablet by mouth as needed      Hormone Cream Base CREA by Does not apply route daily       therapeutic multivitamin-minerals (THERAGRAN-M) tablet Take 1 tablet by mouth daily.  ibuprofen (ADVIL;MOTRIN) 800 MG tablet Take 1 tablet by mouth every 8 hours as needed for Pain (Patient not taking: Reported on 12/1/2020) 30 tablet 0     No current facility-administered medications on file prior to visit. Subjective:     Review of Systems   Constitutional: Positive for fatigue. Negative for appetite change and fever. HENT: Positive for congestion. Negative for ear pain, rhinorrhea and sore throat. Respiratory: Positive for cough. Negative for chest tightness and shortness of breath. Cardiovascular: Negative for chest pain and palpitations. Gastrointestinal: Negative for abdominal distention, abdominal pain, constipation, diarrhea, nausea and vomiting. Genitourinary: Negative for difficulty urinating and dysuria. Musculoskeletal: Negative for arthralgias, back pain and myalgias. Skin: Negative for rash. Neurological: Negative for dizziness, weakness, light-headedness and headaches. Hematological: Negative for adenopathy. Psychiatric/Behavioral: Negative for behavioral problems and sleep disturbance. The patient is not nervous/anxious. Objective:     Physical Exam  Vitals reviewed: Vital signs unavailable, as this is a virtual visit. Constitutional:       General: She is not in acute distress. Appearance: Normal appearance. She is not ill-appearing or toxic-appearing. Pulmonary:      Effort: Pulmonary effort is normal. No tachypnea, accessory muscle usage or respiratory distress. Comments: Patient able to talk in full sentences without difficulty   Neurological:      General: No focal deficit present.       Mental Status: She is alert and oriented to person, place, and time. Psychiatric:         Mood and Affect: Mood normal.         Speech: Speech normal.         Behavior: Behavior normal. Behavior is cooperative. Assessment:      Diagnosis Orders   1. COVID-19 virus infection         Plan:     Pt has self quarantined for 8 days now and her symptoms are improving. Will quarantine for another 6 days and will be able to RTW on Monday 12/7 with no restrictions if her symptoms cont to improve    Will cont with a decongestant and Tylenol prn and call if any change or worsening symptoms    Rest of systems unchanged, continue current treatments. Medications, labs, diagnostic studies, consultations and follow-up as documented in this encounter. Rest of systems unchanged, continue current treatments    Singh Gilbert is a 60 y.o. female being evaluated by a Virtual Visit (video visit) encounter to address concerns as mentioned above. A caregiver was present when appropriate. Due to this being a TeleHealth encounter (During [facility] public [facility] emergency), evaluation of the following organ systems was limited: Vitals/Constitutional/EENT/Resp/CV/GI//MS/Neuro/Skin/Heme-Lymph-Imm. Pursuant to the emergency declaration under the [address] [place] [address], [address] [place] [address] authority and the [employer] Act, this Virtual Visit was conducted with patient's (and/or legal guardian's) consent, to reduce the patient's risk of exposure to COVID-19 and provide necessary medical care. The patient (and/or legal guardian) has also been advised to contact this office for worsening conditions or problems, and seek emergency medical treatment and/or call 911 if deemed necessary.      Patient identification was verified at the start of the visit: Yes    Total time spent for this encounter: Not billed by time    Services were provided through a video synchronous discussion virtually to substitute for in-person

## 2020-12-07 ENCOUNTER — TELEPHONE (OUTPATIENT)
Dept: FAMILY MEDICINE CLINIC | Age: 55
End: 2020-12-07

## 2020-12-24 ENCOUNTER — E-VISIT (OUTPATIENT)
Dept: FAMILY MEDICINE CLINIC | Age: 55
End: 2020-12-24
Payer: COMMERCIAL

## 2020-12-24 PROCEDURE — 99422 OL DIG E/M SVC 11-20 MIN: CPT | Performed by: FAMILY MEDICINE

## 2020-12-24 RX ORDER — NITROFURANTOIN 25; 75 MG/1; MG/1
100 CAPSULE ORAL 2 TIMES DAILY
Qty: 20 CAPSULE | Refills: 0 | Status: SHIPPED | OUTPATIENT
Start: 2020-12-24 | End: 2021-01-03

## 2020-12-24 NOTE — PROGRESS NOTES
HPI: as per patient provided history  Exam: N/A (electronic visit)  ASSESSMENT/PLAN:  1. Acute cystitis without hematuria  Macrobid, fluids, and cranberry juice. Patient instructed to call the office if worsens, or fails to improve as anticipated. 11-20 minutes were spent on the digital evaluation and management of this patient.

## 2021-01-04 RX ORDER — IBUPROFEN 800 MG/1
TABLET ORAL
Qty: 90 TABLET | Refills: 3 | Status: SHIPPED | OUTPATIENT
Start: 2021-01-04 | End: 2022-03-14 | Stop reason: ALTCHOICE

## 2021-02-01 ENCOUNTER — HOSPITAL ENCOUNTER (OUTPATIENT)
Age: 56
Setting detail: SPECIMEN
Discharge: HOME | End: 2021-02-01
Payer: COMMERCIAL

## 2021-02-01 DIAGNOSIS — E78.2 MIXED HYPERLIPIDEMIA: ICD-10-CM

## 2021-02-01 DIAGNOSIS — R73.9 HYPERGLYCEMIA: ICD-10-CM

## 2021-02-01 DIAGNOSIS — E03.9 HYPOTHYROIDISM, UNSPECIFIED TYPE: ICD-10-CM

## 2021-02-01 LAB
ALBUMIN SERPL-MCNC: 4.3 G/DL (ref 3.5–5.2)
ALBUMIN/GLOBULIN RATIO: 1.7 (ref 1–2.5)
ALP BLD-CCNC: 73 U/L (ref 35–104)
ALT SERPL-CCNC: 39 U/L (ref 5–33)
ANION GAP SERPL CALCULATED.3IONS-SCNC: 13 MMOL/L (ref 9–17)
AST SERPL-CCNC: 26 U/L
BILIRUB SERPL-MCNC: 0.3 MG/DL (ref 0.3–1.2)
BUN BLDV-MCNC: 11 MG/DL (ref 6–20)
BUN/CREAT BLD: ABNORMAL (ref 9–20)
CALCIUM SERPL-MCNC: 9 MG/DL (ref 8.6–10.4)
CHLORIDE BLD-SCNC: 106 MMOL/L (ref 98–107)
CHOLESTEROL/HDL RATIO: 4.9
CHOLESTEROL: 209 MG/DL
CO2: 27 MMOL/L (ref 20–31)
CREAT SERPL-MCNC: 0.8 MG/DL (ref 0.5–0.9)
GFR AFRICAN AMERICAN: >60 ML/MIN
GFR NON-AFRICAN AMERICAN: >60 ML/MIN
GFR SERPL CREATININE-BSD FRML MDRD: ABNORMAL ML/MIN/{1.73_M2}
GFR SERPL CREATININE-BSD FRML MDRD: ABNORMAL ML/MIN/{1.73_M2}
GLUCOSE BLD-MCNC: 96 MG/DL (ref 70–99)
HCT VFR BLD CALC: 42.5 % (ref 36.3–47.1)
HDLC SERPL-MCNC: 43 MG/DL
HEMOGLOBIN: 13.3 G/DL (ref 11.9–15.1)
LDL CHOLESTEROL: 117 MG/DL (ref 0–130)
MCH RBC QN AUTO: 28 PG (ref 25.2–33.5)
MCHC RBC AUTO-ENTMCNC: 31.3 G/DL (ref 28.4–34.8)
MCV RBC AUTO: 89.5 FL (ref 82.6–102.9)
NRBC AUTOMATED: 0 PER 100 WBC
PDW BLD-RTO: 13.9 % (ref 11.8–14.4)
PLATELET # BLD: 379 K/UL (ref 138–453)
PMV BLD AUTO: 10 FL (ref 8.1–13.5)
POTASSIUM SERPL-SCNC: 4.2 MMOL/L (ref 3.7–5.3)
RBC # BLD: 4.75 M/UL (ref 3.95–5.11)
SODIUM BLD-SCNC: 146 MMOL/L (ref 135–144)
THYROXINE, FREE: 1.14 NG/DL (ref 0.93–1.7)
TOTAL PROTEIN: 6.9 G/DL (ref 6.4–8.3)
TRIGL SERPL-MCNC: 247 MG/DL
TSH SERPL DL<=0.05 MIU/L-ACNC: 2.4 MIU/L (ref 0.3–5)
VLDLC SERPL CALC-MCNC: ABNORMAL MG/DL (ref 1–30)
WBC # BLD: 7.4 K/UL (ref 3.5–11.3)

## 2021-02-02 LAB
ESTIMATED AVERAGE GLUCOSE: 123 MG/DL
HBA1C MFR BLD: 5.9 % (ref 4–6)

## 2021-03-05 ENCOUNTER — HOSPITAL ENCOUNTER (OUTPATIENT)
Age: 56
Discharge: HOME | End: 2021-03-05
Payer: COMMERCIAL

## 2021-03-05 LAB
ESTRADIOL LEVEL: 19 PG/ML (ref 5–50)
HCT VFR BLD CALC: 38.8 % (ref 36–46)
HEMOGLOBIN: 13 G/DL (ref 12–16)
HOMOCYSTEINE: 9 UMOL/L
PROGESTERONE LEVEL: 0.27 NG/ML
T3 FREE: 2.85 PG/ML (ref 2.02–4.43)
T3 TOTAL: 101 NG/DL (ref 60–181)
T4 TOTAL: 7.8 UG/DL (ref 4.5–10.9)
TESTOSTERONE TOTAL: 23 NG/DL (ref 20–70)
THYROXINE, FREE: 1.27 NG/DL (ref 0.93–1.7)
TSH SERPL DL<=0.05 MIU/L-ACNC: 0.46 MIU/L (ref 0.3–5)
VITAMIN D 25-HYDROXY: 30.6 NG/ML (ref 30–100)

## 2021-03-05 PROCEDURE — 82627 DEHYDROEPIANDROSTERONE: CPT

## 2021-03-05 PROCEDURE — 84480 ASSAY TRIIODOTHYRONINE (T3): CPT

## 2021-03-05 PROCEDURE — 84403 ASSAY OF TOTAL TESTOSTERONE: CPT

## 2021-03-05 PROCEDURE — 85018 HEMOGLOBIN: CPT

## 2021-03-05 PROCEDURE — 84439 ASSAY OF FREE THYROXINE: CPT

## 2021-03-05 PROCEDURE — 84436 ASSAY OF TOTAL THYROXINE: CPT

## 2021-03-05 PROCEDURE — 83090 ASSAY OF HOMOCYSTEINE: CPT

## 2021-03-05 PROCEDURE — 82670 ASSAY OF TOTAL ESTRADIOL: CPT

## 2021-03-05 PROCEDURE — 86800 THYROGLOBULIN ANTIBODY: CPT

## 2021-03-05 PROCEDURE — 85014 HEMATOCRIT: CPT

## 2021-03-05 PROCEDURE — 84481 FREE ASSAY (FT-3): CPT

## 2021-03-05 PROCEDURE — 84144 ASSAY OF PROGESTERONE: CPT

## 2021-03-05 PROCEDURE — 82306 VITAMIN D 25 HYDROXY: CPT

## 2021-03-05 PROCEDURE — 84482 T3 REVERSE: CPT

## 2021-03-05 PROCEDURE — 36415 COLL VENOUS BLD VENIPUNCTURE: CPT

## 2021-03-05 PROCEDURE — 86376 MICROSOMAL ANTIBODY EACH: CPT

## 2021-03-05 PROCEDURE — 84443 ASSAY THYROID STIM HORMONE: CPT

## 2021-03-08 LAB
THYROGLOBULIN AB: 693 IU/ML (ref 0–40)
THYROID PEROXIDASE (TPO) AB: 45 IU/ML (ref 0–25)

## 2021-03-09 ENCOUNTER — HOSPITAL ENCOUNTER (OUTPATIENT)
Dept: WOMENS IMAGING | Age: 56
Discharge: HOME | End: 2021-03-11
Payer: COMMERCIAL

## 2021-03-09 DIAGNOSIS — Z12.31 VISIT FOR SCREENING MAMMOGRAM: ICD-10-CM

## 2021-03-09 DIAGNOSIS — Z12.31 OTHER SCREENING MAMMOGRAM: ICD-10-CM

## 2021-03-09 LAB — DHEAS (DHEA SULFATE): <15 UG/DL (ref 26–200)

## 2021-03-09 PROCEDURE — 77063 BREAST TOMOSYNTHESIS BI: CPT

## 2021-03-10 LAB — T3 REVERSE: 13.2 NG/DL (ref 9–27)

## 2021-03-31 ENCOUNTER — TELEPHONE (OUTPATIENT)
Dept: FAMILY MEDICINE CLINIC | Age: 56
End: 2021-03-31

## 2021-03-31 ENCOUNTER — TELEMEDICINE (OUTPATIENT)
Dept: FAMILY MEDICINE CLINIC | Age: 56
End: 2021-03-31
Payer: COMMERCIAL

## 2021-03-31 DIAGNOSIS — E03.9 HYPOTHYROIDISM, UNSPECIFIED TYPE: ICD-10-CM

## 2021-03-31 DIAGNOSIS — F41.9 ANXIETY: ICD-10-CM

## 2021-03-31 DIAGNOSIS — R73.9 HYPERGLYCEMIA: ICD-10-CM

## 2021-03-31 DIAGNOSIS — E78.2 MIXED HYPERLIPIDEMIA: Primary | ICD-10-CM

## 2021-03-31 DIAGNOSIS — K21.9 GASTROESOPHAGEAL REFLUX DISEASE WITHOUT ESOPHAGITIS: ICD-10-CM

## 2021-03-31 PROBLEM — Z90.710 HISTORY OF TOTAL HYSTERECTOMY: Status: RESOLVED | Noted: 2020-05-07 | Resolved: 2021-03-31

## 2021-03-31 PROBLEM — Z98.890 POSTOPERATIVE STATE: Status: RESOLVED | Noted: 2020-07-14 | Resolved: 2021-03-31

## 2021-03-31 PROBLEM — N89.8 GRANULATION TISSUE OF VAGINAL CUFF: Status: RESOLVED | Noted: 2020-05-07 | Resolved: 2021-03-31

## 2021-03-31 PROBLEM — R89.9 ABNORMAL LABORATORY TEST: Status: RESOLVED | Noted: 2020-05-07 | Resolved: 2021-03-31

## 2021-03-31 PROBLEM — N84.2 VAGINAL POLYP: Status: RESOLVED | Noted: 2019-11-08 | Resolved: 2021-03-31

## 2021-03-31 PROCEDURE — 99214 OFFICE O/P EST MOD 30 MIN: CPT | Performed by: FAMILY MEDICINE

## 2021-03-31 RX ORDER — ASPIRIN 81 MG/1
81 TABLET ORAL DAILY
COMMUNITY
End: 2022-09-19

## 2021-03-31 ASSESSMENT — ENCOUNTER SYMPTOMS
SHORTNESS OF BREATH: 0
NAUSEA: 0
DIARRHEA: 0
RHINORRHEA: 0
SORE THROAT: 0
CHEST TIGHTNESS: 0
CONSTIPATION: 0
VOMITING: 0
BACK PAIN: 0
ABDOMINAL PAIN: 0
ABDOMINAL DISTENTION: 0
COUGH: 0

## 2021-03-31 ASSESSMENT — PATIENT HEALTH QUESTIONNAIRE - PHQ9
SUM OF ALL RESPONSES TO PHQ QUESTIONS 1-9: 0
SUM OF ALL RESPONSES TO PHQ QUESTIONS 1-9: 0
1. LITTLE INTEREST OR PLEASURE IN DOING THINGS: 0
SUM OF ALL RESPONSES TO PHQ QUESTIONS 1-9: 0

## 2021-03-31 NOTE — PROGRESS NOTES
Kristi R. Aleda Prader, MD    [facility]  [id number]  [address] [address]  Dept: [phone]  Dept Fax: [phone]     Patient ID: Lupe Santizo is a 60 y.o. female. HPI    Established patient presenting today via virtual visit for f/u on chronic medical problems, go over labs and/or diagnostic studies, and medication refills. Pt denies any fever or chills. Pt today denies any HA, chest pain, or SOB. Pt denies any N/V/D/C or abdominal pain. Pt states mood is stable on meds. Pt with occasional heartburn, but stable on meds. Otherwise pt doing well on current tx and no other concerns today. The patient's past medical, surgical, social, and family history as well as his current medications and allergies were reviewed as documented in today's encounter. My previous office notes, labs and diagnostic studies were reviewed prior to and during encounter.     Current Outpatient Medications on File Prior to Visit   Medication Sig Dispense Refill    aspirin 81 MG EC tablet Take 81 mg by mouth daily      ibuprofen (ADVIL;MOTRIN) 800 MG tablet TAKE 1 TABLET BY MOUTH THREE TIMES A DAY WITH MEALS 90 tablet 3    vitamin C (ASCORBIC ACID) 500 MG tablet Take 500 mg by mouth daily      zinc gluconate 50 MG tablet Take 50 mg by mouth daily      Cholecalciferol (VITAMIN D) 50 MCG (2000 UT) CAPS capsule Take by mouth      atorvastatin (LIPITOR) 20 MG tablet Take 1 tablet by mouth daily 90 tablet 3    levothyroxine (SYNTHROID) 112 MCG tablet Take 1 tablet by mouth Daily 90 tablet 3    omeprazole (PRILOSEC) 20 MG delayed release capsule TAKE 1 CAPSULE BY MOUTH ONE TIME A DAY (Patient taking differently: Take 20 mg by mouth Daily ) 90 capsule 3    FLUoxetine (PROZAC) 20 MG capsule TAKE 1 CAPSULE BY MOUTH ONE TIME A DAY (Patient taking differently: Take 20 mg by mouth daily TAKE 1 CAPSULE BY MOUTH ONE TIME A DAY) 90 capsule 3    fluticasone (FLONASE) 50 MCG/ACT nasal spray INHALE ONE SPRAY INTO EACH NOSTRIL DAILY (Patient taking differently: 1 spray by Nasal route daily ) 16 g 3    diphenhydrAMINE HCl (BENADRYL ALLERGY PO) Take 1 tablet by mouth as needed      Hormone Cream Base CREA by Does not apply route daily       therapeutic multivitamin-minerals (THERAGRAN-M) tablet Take 1 tablet by mouth daily. No current facility-administered medications on file prior to visit. Subjective:     Review of Systems   Constitutional: Negative for appetite change, fatigue and fever. HENT: Negative for congestion, ear pain, rhinorrhea and sore throat. Respiratory: Negative for cough, chest tightness and shortness of breath. Cardiovascular: Negative for chest pain and palpitations. Gastrointestinal: Negative for abdominal distention, abdominal pain, constipation, diarrhea, nausea and vomiting. Occ heartburn   Genitourinary: Negative for difficulty urinating and dysuria. Musculoskeletal: Negative for arthralgias, back pain and myalgias. Skin: Negative for rash. Neurological: Negative for dizziness, weakness, light-headedness and headaches. Hematological: Negative for adenopathy. Psychiatric/Behavioral: Negative for behavioral problems and sleep disturbance. The patient is not nervous/anxious (stable). Objective:     Physical Exam  Vitals signs reviewed. Constitutional:       General: She is not in acute distress. Appearance: She is well-developed. HENT:      Head: Normocephalic and atraumatic. Right Ear: External ear normal.      Left Ear: External ear normal.      Nose: Nose normal.      Mouth/Throat:      Pharynx: No oropharyngeal exudate. Eyes:      Conjunctiva/sclera: Conjunctivae normal.      Pupils: Pupils are equal, round, and reactive to light. Neck:      Musculoskeletal: Normal range of motion. Cardiovascular:      Rate and Rhythm: Normal rate and regular rhythm.       Heart sounds: Normal heart sounds. No murmur. Pulmonary:      Effort: Pulmonary effort is normal. No respiratory distress. Breath sounds: Normal breath sounds. No wheezing. Chest:      Chest wall: No tenderness. Abdominal:      General: Bowel sounds are normal. There is no distension. Palpations: Abdomen is soft. There is no mass. Tenderness: There is no abdominal tenderness. Musculoskeletal: Normal range of motion. General: No tenderness. Lymphadenopathy:      Cervical: No cervical adenopathy. Skin:     Findings: No rash. Neurological:      Mental Status: She is alert and oriented to person, place, and time. Deep Tendon Reflexes: Reflexes are normal and symmetric. Psychiatric:         Behavior: Behavior normal.         Assessment:      Diagnosis Orders   1. Mixed hyperlipidemia  Lipid Panel   2. Hyperglycemia  CBC    Comprehensive Metabolic Panel    Hemoglobin A1C   3. Hypothyroidism, unspecified type  T4, Free    TSH without Reflex   4. Gastroesophageal reflux disease without esophagitis     5.  Anxiety           Plan:     Orders Placed This Encounter   Procedures    CBC     Standing Status:   Future     Standing Expiration Date:   3/31/2022    Comprehensive Metabolic Panel     Standing Status:   Future     Standing Expiration Date:   3/31/2022    Hemoglobin A1C     Standing Status:   Future     Standing Expiration Date:   3/31/2022    Lipid Panel     Standing Status:   Future     Standing Expiration Date:   3/31/2022     Order Specific Question:   Is Patient Fasting?/# of Hours     Answer:   8-10 Hours    T4, Free     Standing Status:   Future     Standing Expiration Date:   3/31/2022    TSH without Reflex     Standing Status:   Future     Standing Expiration Date:   3/31/2022        Will cont with current treatment as pt is currently stable on current treatment    Will cont with low fat/chol diet and Lipitor as ordered    Continue to watch carbs secondary to increased homes.    --Judy Ty MD on 3/31/2021 at 7:54 AM    An electronic signature was used to authenticate this note. Tere Mccray MD

## 2021-05-18 ENCOUNTER — HOSPITAL ENCOUNTER (OUTPATIENT)
Age: 56
Discharge: HOME | End: 2021-05-18
Payer: COMMERCIAL

## 2021-05-18 LAB
ALBUMIN SERPL-MCNC: 4.2 G/DL (ref 3.5–5.2)
ALBUMIN/GLOBULIN RATIO: ABNORMAL (ref 1–2.5)
ALP BLD-CCNC: 78 U/L (ref 35–104)
ALT SERPL-CCNC: 16 U/L (ref 5–33)
ANION GAP SERPL CALCULATED.3IONS-SCNC: 12 MMOL/L (ref 9–17)
AST SERPL-CCNC: 20 U/L
BILIRUB SERPL-MCNC: 0.32 MG/DL (ref 0.3–1.2)
BUN BLDV-MCNC: 9 MG/DL (ref 6–20)
BUN/CREAT BLD: ABNORMAL (ref 9–20)
CALCIUM SERPL-MCNC: 9.3 MG/DL (ref 8.6–10.4)
CHLORIDE BLD-SCNC: 103 MMOL/L (ref 98–107)
CO2: 26 MMOL/L (ref 20–31)
CREAT SERPL-MCNC: 0.67 MG/DL (ref 0.5–0.9)
GFR AFRICAN AMERICAN: >60 ML/MIN
GFR NON-AFRICAN AMERICAN: >60 ML/MIN
GFR SERPL CREATININE-BSD FRML MDRD: ABNORMAL ML/MIN/{1.73_M2}
GFR SERPL CREATININE-BSD FRML MDRD: ABNORMAL ML/MIN/{1.73_M2}
GLUCOSE BLD-MCNC: 107 MG/DL (ref 70–99)
IRON SATURATION: 22 % (ref 20–55)
IRON: 66 UG/DL (ref 37–145)
POTASSIUM SERPL-SCNC: 4 MMOL/L (ref 3.7–5.3)
SODIUM BLD-SCNC: 141 MMOL/L (ref 135–144)
T3 FREE: 2.64 PG/ML (ref 2.02–4.43)
THYROXINE, FREE: 1.14 NG/DL (ref 0.93–1.7)
TOTAL IRON BINDING CAPACITY: 305 UG/DL (ref 250–450)
TOTAL PROTEIN: 7.2 G/DL (ref 6.4–8.3)
TSH SERPL DL<=0.05 MIU/L-ACNC: 4.34 MIU/L (ref 0.3–5)
UNSATURATED IRON BINDING CAPACITY: 239 UG/DL (ref 112–347)

## 2021-05-18 PROCEDURE — 84439 ASSAY OF FREE THYROXINE: CPT

## 2021-05-18 PROCEDURE — 83540 ASSAY OF IRON: CPT

## 2021-05-18 PROCEDURE — 83550 IRON BINDING TEST: CPT

## 2021-05-18 PROCEDURE — 36415 COLL VENOUS BLD VENIPUNCTURE: CPT

## 2021-05-18 PROCEDURE — 82306 VITAMIN D 25 HYDROXY: CPT

## 2021-05-18 PROCEDURE — 84481 FREE ASSAY (FT-3): CPT

## 2021-05-18 PROCEDURE — 84443 ASSAY THYROID STIM HORMONE: CPT

## 2021-05-18 PROCEDURE — 80053 COMPREHEN METABOLIC PANEL: CPT

## 2021-05-19 ENCOUNTER — HOSPITAL ENCOUNTER (OUTPATIENT)
Age: 56
Setting detail: SPECIMEN
Discharge: HOME | End: 2021-05-19
Payer: COMMERCIAL

## 2021-05-19 LAB — VITAMIN D 25-HYDROXY: 27.1 NG/ML (ref 30–100)

## 2021-05-19 PROCEDURE — 82533 TOTAL CORTISOL: CPT

## 2021-05-21 LAB — CORTISOL SALIVARY: 0.03 UG/DL

## 2021-06-21 ENCOUNTER — OFFICE VISIT (OUTPATIENT)
Dept: ORTHOPEDIC SURGERY | Age: 56
End: 2021-06-21
Payer: COMMERCIAL

## 2021-06-21 VITALS
HEIGHT: 66 IN | BODY MASS INDEX: 39.53 KG/M2 | OXYGEN SATURATION: 98 % | RESPIRATION RATE: 12 BRPM | HEART RATE: 83 BPM | WEIGHT: 246 LBS

## 2021-06-21 DIAGNOSIS — G89.29 CHRONIC PAIN OF LEFT KNEE: Primary | ICD-10-CM

## 2021-06-21 DIAGNOSIS — M25.562 CHRONIC PAIN OF LEFT KNEE: Primary | ICD-10-CM

## 2021-06-21 DIAGNOSIS — M17.10 PATELLOFEMORAL ARTHRITIS: ICD-10-CM

## 2021-06-21 PROCEDURE — 99214 OFFICE O/P EST MOD 30 MIN: CPT | Performed by: PHYSICIAN ASSISTANT

## 2021-06-21 PROCEDURE — 20610 DRAIN/INJ JOINT/BURSA W/O US: CPT | Performed by: PHYSICIAN ASSISTANT

## 2021-06-21 RX ORDER — TRIAMCINOLONE ACETONIDE 40 MG/ML
40 INJECTION, SUSPENSION INTRA-ARTICULAR; INTRAMUSCULAR ONCE
Status: COMPLETED | OUTPATIENT
Start: 2021-06-21 | End: 2021-06-21

## 2021-06-21 RX ORDER — LIDOCAINE HYDROCHLORIDE 10 MG/ML
4 INJECTION, SOLUTION INFILTRATION; PERINEURAL ONCE
Status: COMPLETED | OUTPATIENT
Start: 2021-06-21 | End: 2021-06-21

## 2021-06-21 RX ADMIN — TRIAMCINOLONE ACETONIDE 40 MG: 40 INJECTION, SUSPENSION INTRA-ARTICULAR; INTRAMUSCULAR at 09:57

## 2021-06-21 RX ADMIN — LIDOCAINE HYDROCHLORIDE 4 ML: 10 INJECTION, SOLUTION INFILTRATION; PERINEURAL at 09:57

## 2021-06-21 NOTE — PROGRESS NOTES
[address] [facility], [address] [place], [address] [place] [address], [address] [facility]           Dept Phone: [phone]           Dept Fax:  [phone]  [address] [facility], [place]          Dept Phone: [phone]           Dept Fax:  [phone]      Chief Compliant:  Chief Complaint   Patient presents with    Knee Pain     left        History of Present Illness: This is a 60 y.o. female who presents to the clinic today for evaluation of had concerns including Knee Pain (left). Patient reports chronic left knee pain most severe behind the patella on the lateral aspect. Pain is aggravated by steps and rising from seated position. She had an MRI back in June 2021 which was negative for any internal derangement however did demonstrate some moderate degenerative changes specifically in the patellofemoral compartment. She is offered a cortisone injection at that time however opted to hold off and just treat the medication conservatively with ibuprofen. She presents today due to continued knee pain for reevaluation in hopes for a possible injection. Patient denies any joint warmth, redness, fever or chills.          Past History:    Current Outpatient Medications:     aspirin 81 MG EC tablet, Take 81 mg by mouth daily, Disp: , Rfl:     ibuprofen (ADVIL;MOTRIN) 800 MG tablet, TAKE 1 TABLET BY MOUTH THREE TIMES A DAY WITH MEALS, Disp: 90 tablet, Rfl: 3    vitamin C (ASCORBIC ACID) 500 MG tablet, Take 500 mg by mouth daily, Disp: , Rfl:     zinc gluconate 50 MG tablet, Take 50 mg by mouth daily, Disp: , Rfl:     Cholecalciferol (VITAMIN D) 50 MCG (2000 UT) CAPS capsule, Take by mouth, Disp: , Rfl:     atorvastatin (LIPITOR) 20 MG tablet, Take 1 tablet by mouth daily, Disp: 90 tablet, Rfl: 3    levothyroxine (SYNTHROID) 112 MCG tablet, Take 1 tablet by mouth Daily, Disp: 90 tablet, Rfl: 3    omeprazole (PRILOSEC) 20 MG delayed release capsule, TAKE 1 CAPSULE BY MOUTH ONE TIME A DAY (Patient taking differently: Take 20 mg by mouth Daily ), Disp: 90 capsule, Rfl: 3    FLUoxetine (PROZAC) 20 MG capsule, TAKE 1 CAPSULE BY MOUTH ONE TIME A DAY (Patient taking differently: Take 20 mg by mouth daily TAKE 1 CAPSULE BY MOUTH ONE TIME A DAY), Disp: 90 capsule, Rfl: 3    fluticasone (FLONASE) 50 MCG/ACT nasal spray, INHALE ONE SPRAY INTO EACH NOSTRIL DAILY (Patient taking differently: 1 spray by Nasal route daily ), Disp: 16 g, Rfl: 3    diphenhydrAMINE HCl (BENADRYL ALLERGY PO), Take 1 tablet by mouth as needed, Disp: , Rfl:     Hormone Cream Base CREA, by Does not apply route daily , Disp: , Rfl:     therapeutic multivitamin-minerals (THERAGRAN-M) tablet, Take 1 tablet by mouth daily. , Disp: , Rfl:   Allergies   Allergen Reactions    Cephalexin Rash     Rash when in the sun    Morphine Itching    Percocet [Oxycodone-Acetaminophen] Itching    Seasonal Other (See Comments)     POST NASAL DRIP     Social History     Socioeconomic History    Marital status:      Spouse name: Not on file    Number of children: Not on file    Years of education: Not on file    Highest education level: Not on file   Occupational History    Not on file   Tobacco Use    Smoking status: Never Smoker    Smokeless tobacco: Never Used   Vaping Use    Vaping Use: Never used   Substance and Sexual Activity    Alcohol use:  Yes     Alcohol/week: 1.0 standard drinks     Types: 1 Glasses of wine per week     Comment: 1 time per month    Drug use: No    Sexual activity: Not Currently     Partners: Male   Other Topics Concern    Not on file   Social History Narrative    Not on file     Social Determinants of Health     Financial Resource Strain:     Difficulty of Paying Living Expenses:    Food Insecurity:     Worried About Running Out of Food in the Last Year:     [employer] in the Last Year:    Transportation Needs:     Lack of Transportation (Medical):  Lack of Transportation (Non-Medical):    Physical Activity:     Days of Exercise per Week:     Minutes of Exercise per Session:    Stress:     Feeling of Stress :    Social Connections:     Frequency of Communication with Friends and Family:     Frequency of Social Gatherings with Friends and Family:     Attends Jainism Services:     Active Member of Clubs or Organizations:     Attends Club or Organization Meetings:     Marital Status:    Intimate Partner Violence:     Fear of Current or Ex-Partner:     Emotionally Abused:     Physically Abused:     Sexually Abused:      Past Medical History:   Diagnosis Date    Anxiety     Cystocele     Gastroesophageal reflux disease without esophagitis 10/24/2018    Hyperlipidemia     Sleep apnea     USES CPAP MACHINE.  Thyroid disease     Urinary incontinence     Uterine fibroid     Vaginal prolapse     Vaginal spotting     Wears glasses     READING     Past Surgical History:   Procedure Laterality Date    BLADDER SUSPENSION  2016    BREAST SURGERY Bilateral 02/2013    breast reduction    CERVICAL CERCLAGE N/A 11/8/2019    SUTURE REPAIR CERVICAL CUP performed by Woodrow Sosa MD at [id number]  2007    COLONOSCOPY  7/25/14    normal    CYSTOSCOPY  7/13/15    Robotic cystocele repair, JORGE, lynx sling, sacrocolpopexy    ENDOMETRIAL ABLATION  2009 ?     with bladder sling TVT    ENDOSCOPY, COLON, DIAGNOSTIC  09/15/2017    EGD    HERNIA REPAIR  1437    umbilical    HYSTERECTOMY  12/10/2013    VAGINAL, OVARIES ALSO REMOVED    HYSTERECTOMY, VAGINAL  12/10/2013    RAVH and posterior repair    AK EGD TRANSORAL BIOPSY SINGLE/MULTIPLE N/A 9/15/2017    EGD BIOPSY performed by Javad Mason DO at [address] [place] [address]  07/14/2020    PARTIAL VAGINECTOMY     VAGINA SURGERY N/A 7/14/2020    PARTIAL VAGINECTOMY WITH ULTRASONIC SCAPEL ([facility] W/ LONG HAND PIECE) performed by Juan Francisco Kahn MD at [facility] History   Problem Relation Age of Onset    High Cholesterol Mother     High Blood Pressure Father     Heart Disease Father         stent    Cancer Father         bladder    Breast Cancer Maternal Aunt 79    Heart Disease Maternal Grandfather     Arthritis Maternal Grandfather     Cancer Maternal Grandfather         bone CA    Other Paternal Grandmother         Leukemia    Other Paternal Grandfather         Leukemia    Heart Disease Paternal Grandfather     No Known Problems Sister     No Known Problems Brother         Review of Systems   Constitutional: Negative for fever, chills, sweats. Eyes: Negative for changes in vision, or pain. HENT: Negative for ear ache, epistaxis, or sore throat. Respiratory/Cardio: Negative for Chest pain, palpitations, SOB, or cough. Gastrointestinal: Negative for abdominal pain, N/V/D. Genitourinary: Negative for dysuria, frequency, urgency, or hematuria. Neurological: Negative for headache, numbness, or weakness. Integumentary: Negative for rash, itching, laceration, or abrasion. Musculoskeletal: Positive for Knee Pain (left)       Physical Exam:  Constitutional: Patient is oriented to person, place, and time. Patient appears well-developed and well nourished. HENT: Negative otherwise noted  Head: Normocephalic and Atraumatic  Nose: Normal  Eyes: Conjunctivae and EOM are normal  Neck: Normal range of motion Neck supple. Respiratory/Cardio: Effort normal. No respiratory distress. Musculoskeletal:    left Knee:     Skin: warm and dry, no rash or erythema  Vasculature: 2+ pedal pulses bilaterally  Neuro: Sensation grossly intact to light touch diffusely  Alignment: Normal  Tenderness: Moderate tenderness over the lateral border the patella. No tenderness to either joint line no tenderness to quad/patellar tendon, pes anserine bursa or posterior knee.   Effusion: None    ROM: (Degrees)       A P       Extension  0 0       Flexion   120 130       Crepitation  Yes       Muscle strength:         Flexion   5      Extension  5      SLR   5        Extensor lag   n          Special testing:  y    Pain with deep knee flexion     y    Patellar grind       y    Patellar apprehension      n    Patellar glide         n    Lachman       n    Anterior drawer      n    Pivot shift       n    Posterior drawer      n    Dial test       n    Posterolateral drawer      n    Posterior Sag       n    MCL        n    LCL          n    Medial joint line tenderness     n    Lateral joint line tenderness     n    Appley's         Neurological: Patient is alert and oriented to person, place, and time. Normal strenght. No sensory deficit. Skin: Skin is warm and dry  Psychiatric: Behavior is normal. Thought content normal.  Nursing note and vitals reviewed. Labs and Imaging:     [facility] RAJNI DIGITAL SCREEN SELF REFERRAL W OR WO CAD BILATERAL  Narrative: EXAMINATION:  SCREENING DIGITAL BILATERAL  MAMMOGRAM WITH TOMOSYNTHESIS, 3/9/2021    TECHNIQUE:  Screening mammography of the bilateral breasts was performed with  tomosynthesis. 2D standard and 3D tomosynthesis combination imaging  performed through both breasts in the MLO and CC projection. Computer aided  detection was utilized in the interpretation of this exam.    COMPARISON:  12/18/2019, 08/30/2017    HISTORY:  Screening. History of breast reduction. FINDINGS:  The breast tissue is fatty replaced. There is no suspicious mass, suspicious  microcalcification, or area of architectural distortion. Impression: No mammographic evidence of malignancy. BI-RADS 1    BIRADS:  BIRADS - CATEGORY 1    Negative, no evidence of malignancy. Normal interval follow-up is  recommended in 12 months. OVERALL ASSESSMENT - NEGATIVE    A letter of notification will be sent to the patient regarding the results.     The [employer] recommends annual mammograms for women 40  years and older. X-rays taken in clinic today and preliminarily reviewed by me:  AP bilateral knees standing lateral view of the left knee demonstrates moderate patellofemoral degenerative changes with minimal medial compartmental narrowing. No evidence of acute fracture. Right knee with minimal degenerative changes on AP view. Orders Placed This Encounter   Procedures    XR KNEE LEFT (1-2 VIEWS)     Standing Status:   Future     Number of Occurrences:   1     Standing Expiration Date:   6/21/2022       Assessment and Plan:  1. Chronic pain of left knee    2. Patellofemoral arthritis          PLAN:  Dale Escamilla is a 60 y.o. old female with left patellofemoral knee osteoarthritis. I had a discussion with the patient with regards to the nature and extent of her problem. We also discussed treatment options available to her including non-operative and operative intervention. To this end we discussed use of NSAIDs, cortisone and viscosupplementation injections, weight loss, activity modification, physical therapy, bracing, and use of assistive walking devices. We also had discussions about total knee arthroplasties. As outlined above she has attempted treatment to include use of meloxicam, ibuprofen and activity modification. At this time she would like to proceed with corticosteroid injection. 1.  Kenalog injection given as outlined below. 2.  Patient is educated on postinjection protocol  3. Return to clinic in 4 to 6 weeks for reevaluation however she may call return sooner for any questions or    Procedure Note: Left Knee Kenalog Injection   An informed verbal consent for the procedure was obtained and risks including, but not limited to: allergy to medications, injection, bleeding, stiffness of joint, recurrence of symptoms, loss of function, swelling, drainage, irrigation, need for surgery and pseudo-septic inflammation, were explained to the patient.  Also,

## 2021-06-22 ENCOUNTER — HOSPITAL ENCOUNTER (OUTPATIENT)
Age: 56
Discharge: HOME | End: 2021-06-22
Payer: COMMERCIAL

## 2021-06-22 LAB
ESTIMATED AVERAGE GLUCOSE: 123 MG/DL
GLUCOSE FASTING: 134 MG/DL (ref 70–99)
HBA1C MFR BLD: 5.9 % (ref 4–6)
THYROXINE, FREE: 1.52 NG/DL (ref 0.93–1.7)
TSH SERPL DL<=0.05 MIU/L-ACNC: 0.09 MIU/L (ref 0.3–5)
VITAMIN D 25-HYDROXY: 32.4 NG/ML (ref 30–100)

## 2021-06-22 PROCEDURE — 82947 ASSAY GLUCOSE BLOOD QUANT: CPT

## 2021-06-22 PROCEDURE — 84443 ASSAY THYROID STIM HORMONE: CPT

## 2021-06-22 PROCEDURE — 83036 HEMOGLOBIN GLYCOSYLATED A1C: CPT

## 2021-06-22 PROCEDURE — 82306 VITAMIN D 25 HYDROXY: CPT

## 2021-06-22 PROCEDURE — 84439 ASSAY OF FREE THYROXINE: CPT

## 2021-06-22 PROCEDURE — 36415 COLL VENOUS BLD VENIPUNCTURE: CPT

## 2021-07-06 DIAGNOSIS — F41.9 ANXIETY: ICD-10-CM

## 2021-07-06 RX ORDER — OMEPRAZOLE 20 MG/1
20 CAPSULE, DELAYED RELEASE ORAL DAILY
Qty: 90 CAPSULE | Refills: 3 | Status: SHIPPED | OUTPATIENT
Start: 2021-07-06 | End: 2022-07-26

## 2021-07-06 RX ORDER — FLUOXETINE HYDROCHLORIDE 20 MG/1
20 CAPSULE ORAL DAILY
Qty: 90 CAPSULE | Refills: 3 | Status: SHIPPED | OUTPATIENT
Start: 2021-07-06 | End: 2022-07-26

## 2021-07-20 ENCOUNTER — HOSPITAL ENCOUNTER (OUTPATIENT)
Age: 56
Discharge: HOME | End: 2021-07-20
Payer: COMMERCIAL

## 2021-07-20 LAB
THYROXINE, FREE: 0.97 NG/DL (ref 0.93–1.7)
TSH SERPL DL<=0.05 MIU/L-ACNC: 2.39 MIU/L (ref 0.3–5)

## 2021-07-20 PROCEDURE — 84443 ASSAY THYROID STIM HORMONE: CPT

## 2021-07-20 PROCEDURE — 36415 COLL VENOUS BLD VENIPUNCTURE: CPT

## 2021-07-20 PROCEDURE — 84439 ASSAY OF FREE THYROXINE: CPT

## 2021-08-20 RX ORDER — FLUTICASONE PROPIONATE 50 MCG
SPRAY, SUSPENSION (ML) NASAL
Qty: 1 BOTTLE | Refills: 3 | Status: SHIPPED | OUTPATIENT
Start: 2021-08-20 | End: 2022-09-19 | Stop reason: SDUPTHER

## 2021-09-15 LAB
CHOLESTEROL/HDL RATIO: 4.9
CHOLESTEROL: 181 MG/DL
GLUCOSE BLD-MCNC: 107 MG/DL (ref 70–99)
HDLC SERPL-MCNC: 37 MG/DL
LDL CHOLESTEROL: 108 MG/DL (ref 0–130)
PATIENT FASTING?: YES
TRIGL SERPL-MCNC: 179 MG/DL
VLDLC SERPL CALC-MCNC: ABNORMAL MG/DL (ref 1–30)

## 2021-09-20 RX ORDER — ATORVASTATIN CALCIUM 20 MG/1
TABLET, FILM COATED ORAL
Qty: 90 TABLET | Refills: 3 | Status: SHIPPED | OUTPATIENT
Start: 2021-09-20

## 2021-09-30 ENCOUNTER — TELEMEDICINE (OUTPATIENT)
Dept: FAMILY MEDICINE CLINIC | Age: 56
End: 2021-09-30
Payer: COMMERCIAL

## 2021-09-30 ENCOUNTER — TELEPHONE (OUTPATIENT)
Dept: FAMILY MEDICINE CLINIC | Age: 56
End: 2021-09-30

## 2021-09-30 DIAGNOSIS — E03.9 HYPOTHYROIDISM, UNSPECIFIED TYPE: ICD-10-CM

## 2021-09-30 DIAGNOSIS — F41.9 ANXIETY: ICD-10-CM

## 2021-09-30 DIAGNOSIS — K21.9 GASTROESOPHAGEAL REFLUX DISEASE WITHOUT ESOPHAGITIS: ICD-10-CM

## 2021-09-30 DIAGNOSIS — E78.2 MIXED HYPERLIPIDEMIA: Primary | ICD-10-CM

## 2021-09-30 DIAGNOSIS — R73.9 HYPERGLYCEMIA: ICD-10-CM

## 2021-09-30 PROCEDURE — 99214 OFFICE O/P EST MOD 30 MIN: CPT | Performed by: FAMILY MEDICINE

## 2021-09-30 RX ORDER — LEVOTHYROXINE SODIUM 0.12 MG/1
1 TABLET ORAL DAILY
COMMUNITY
Start: 2021-09-15 | End: 2021-12-13 | Stop reason: DRUGHIGH

## 2021-09-30 SDOH — ECONOMIC STABILITY: FOOD INSECURITY: WITHIN THE PAST 12 MONTHS, YOU WORRIED THAT YOUR FOOD WOULD RUN OUT BEFORE YOU GOT MONEY TO BUY MORE.: NEVER TRUE

## 2021-09-30 SDOH — ECONOMIC STABILITY: FOOD INSECURITY: WITHIN THE PAST 12 MONTHS, THE FOOD YOU BOUGHT JUST DIDN'T LAST AND YOU DIDN'T HAVE MONEY TO GET MORE.: NEVER TRUE

## 2021-09-30 ASSESSMENT — ENCOUNTER SYMPTOMS
COUGH: 0
RHINORRHEA: 0
SORE THROAT: 0
ABDOMINAL DISTENTION: 0
DIARRHEA: 0
SHORTNESS OF BREATH: 0
ABDOMINAL PAIN: 0
VOMITING: 0
CHEST TIGHTNESS: 0
CONSTIPATION: 0
BACK PAIN: 0
NAUSEA: 0

## 2021-09-30 ASSESSMENT — SOCIAL DETERMINANTS OF HEALTH (SDOH): HOW HARD IS IT FOR YOU TO PAY FOR THE VERY BASICS LIKE FOOD, HOUSING, MEDICAL CARE, AND HEATING?: NOT HARD AT ALL

## 2021-09-30 NOTE — PROGRESS NOTES
Tere Arzate MD    [address] [place] [address]  [id number]  [address]  [address] [facility] [address] [address]  Dept: [phone]  Dept Fax: [phone]     Patient ID: Niranjan Benton is a 60 y.o. female. HPI    Established patient here today for a VV and f/u on chronic medical problems, go over labs and/or diagnostic studies, and medication refills. Pt denies any fever or chills. Pt today denies any HA, chest pain, or SOB. Pt denies any N/V/D/C or abdominal pain. Pt with occasional heartburn, but stable on meds. Pt states mood is stable on meds. Otherwise pt doing well on current tx and no other concerns today. The patient's past medical, surgical, social, and family history as well as his current medications and allergies were reviewed as documented in today's encounter. My previous office notes, consult notes, labs and diagnostic studies were reviewed prior to and during encounter.     Current Outpatient Medications on File Prior to Visit   Medication Sig Dispense Refill    levothyroxine (SYNTHROID) 125 MCG tablet Take 1 tablet by mouth daily      atorvastatin (LIPITOR) 20 MG tablet TAKE 1 TABLET BY MOUTH ONE TIME A DAY 90 tablet 3    fluticasone (FLONASE) 50 MCG/ACT nasal spray USE 1 SPRAY IN EACH NOSTRIL DAILY 1 Bottle 3    omeprazole (PRILOSEC) 20 MG delayed release capsule Take 1 capsule by mouth Daily 90 capsule 3    FLUoxetine (PROZAC) 20 MG capsule Take 1 capsule by mouth daily TAKE 1 CAPSULE BY MOUTH ONE TIME A DAY 90 capsule 3    aspirin 81 MG EC tablet Take 81 mg by mouth daily      ibuprofen (ADVIL;MOTRIN) 800 MG tablet TAKE 1 TABLET BY MOUTH THREE TIMES A DAY WITH MEALS 90 tablet 3    vitamin C (ASCORBIC ACID) 500 MG tablet Take 500 mg by mouth daily      zinc gluconate 50 MG tablet Take 50 mg by mouth daily      Cholecalciferol (VITAMIN D) 50 MCG (2000 UT) CAPS capsule Take by mouth      diphenhydrAMINE HCl (BENADRYL ALLERGY PO) Take 1 tablet by mouth as needed      Hormone Cream Base CREA by Does not apply route daily        No current facility-administered medications on file prior to visit. Subjective:     Review of Systems   Constitutional: Negative for appetite change, fatigue and fever. HENT: Negative for congestion, ear pain, rhinorrhea and sore throat. Respiratory: Negative for cough, chest tightness and shortness of breath. Cardiovascular: Negative for chest pain and palpitations. Gastrointestinal: Negative for abdominal distention, abdominal pain, constipation, diarrhea, nausea and vomiting. Occ heartburn   Genitourinary: Negative for difficulty urinating and dysuria. Musculoskeletal: Negative for arthralgias, back pain and myalgias. Skin: Negative for rash. Neurological: Negative for dizziness, weakness, light-headedness and headaches. Hematological: Negative for adenopathy. Psychiatric/Behavioral: Negative for behavioral problems and sleep disturbance. The patient is not nervous/anxious (stable). Objective:     Physical Exam  Vitals reviewed: Vital signs unavailable, as this is a virtual visit. Constitutional:       General: She is not in acute distress. Appearance: Normal appearance. She is not ill-appearing or toxic-appearing. Pulmonary:      Effort: Pulmonary effort is normal. No tachypnea, accessory muscle usage or respiratory distress. Comments: Patient able to talk in full sentences without difficulty   Neurological:      General: No focal deficit present. Mental Status: She is alert and oriented to person, place, and time. Psychiatric:         Mood and Affect: Mood normal.         Speech: Speech normal.         Behavior: Behavior normal. Behavior is cooperative. Assessment:      Diagnosis Orders   1. Mixed hyperlipidemia  Lipid Panel   2. Hyperglycemia  CBC    Comprehensive Metabolic Panel    Hemoglobin A1C   3.  Hypothyroidism, unspecified type  TSH without Reflex    T4, Free   4. Gastroesophageal reflux disease without esophagitis     5. Anxiety           Plan:     Orders Placed This Encounter   Procedures    CBC     Standing Status:   Future     Standing Expiration Date:   9/30/2022    Comprehensive Metabolic Panel     Standing Status:   Future     Standing Expiration Date:   9/30/2022    Lipid Panel     Standing Status:   Future     Standing Expiration Date:   9/30/2022     Order Specific Question:   Is Patient Fasting?/# of Hours     Answer:   8-10 Hours    Hemoglobin A1C     Standing Status:   Future     Standing Expiration Date:   9/30/2022    TSH without Reflex     Standing Status:   Future     Standing Expiration Date:   9/30/2022    T4, Free     Standing Status:   Future     Standing Expiration Date:   9/30/2022      Will cont with current treatment as pt is currently stable on current treatment    Will cont with low fat/chol diet and Lipitor as ordered and more strict with her diet    Continue to watch carbs secondary to increased Triglycerides and BS    Will cont with the Synthroid as prescribed as the thyroid levels are stable    Will cont with the Prozac as prescribed as he/she is doing well    Rest of systems unchanged, continue current treatments. Medications, labs, diagnostic studies, consultations and follow-up as documented in this encounter. Rest of systems unchanged, continue current treatments    On this date September 30, 2021,  I have spent greater than 50% of visit reviewing previous notes, test results and face to face with the patient discussing the diagnoses, importance of compliance with the treatment plan, counseling, coordinating care as well as documenting on the day of the visit. Tarah Stanton, was evaluated through a synchronous (real-time) audio-video encounter. The patient (or guardian if applicable) is aware that this is a billable service.  Verbal consent to proceed has been obtained within the past 12 months. The visit was conducted pursuant to the emergency declaration under the [facility], [address] [place] [address] authority and the [employer] and [employer] [place] [employer] Act. Patient identification was verified, and a caregiver was present when appropriate. The patient was located in a state where the provider was credentialed to provide care. Total time spent for this encounter: Not billed by time    --Ceci Rosario MD on 9/30/2021 at 8:27 AM    An electronic signature was used to authenticate this note. Kristi R. Claudean Bill, MD

## 2021-10-20 ENCOUNTER — TELEPHONE (OUTPATIENT)
Dept: FAMILY MEDICINE CLINIC | Age: 56
End: 2021-10-20

## 2021-10-20 ENCOUNTER — VIRTUAL VISIT (OUTPATIENT)
Dept: FAMILY MEDICINE CLINIC | Age: 56
End: 2021-10-20
Payer: COMMERCIAL

## 2021-10-20 DIAGNOSIS — M54.32 LEFT SIDED SCIATICA: Primary | ICD-10-CM

## 2021-10-20 PROCEDURE — 99213 OFFICE O/P EST LOW 20 MIN: CPT | Performed by: NURSE PRACTITIONER

## 2021-10-20 RX ORDER — PREDNISONE 20 MG/1
TABLET ORAL
Qty: 17 TABLET | Refills: 0 | Status: SHIPPED | OUTPATIENT
Start: 2021-10-20 | End: 2021-10-30

## 2021-10-20 NOTE — TELEPHONE ENCOUNTER
Patient scheduled for an appointment.
Skin normal color for race, warm, dry and intact. No evidence of rash.

## 2021-10-20 NOTE — LETTER
[id number]  [address] , [facility] [address]  [address] [address]  Phone: [phone]  Fax: [phone]    CHRIS Thomas CNP        October 20, 2021     Patient: Kellie Llamas   YOB: 1965   Date of Visit: 10/20/2021       To Whom it May Concern:    Kellie Llamas was seen in my clinic on 10/20/2021. She may return to work on 10/25/2021. If you have any questions or concerns, please don't hesitate to call.     Sincerely,           CHRIS Thomas CNP

## 2021-10-20 NOTE — PROGRESS NOTES
Berlin Brady, APRN-CNP  [address] FAMILY [facility]  [phone]  [address] [address]  Dept: [phone]  Dept Fax: [phone]     Patient ID: Tarah Díaz is a 60 y.o. female Established patient of Dr. Viridiana Villarreal. HPI    Virtual visit today for left hip/buttocks pain on left side that started with a little tinge pain and it is constant but if she moves a certain way gets excruciating pain. She was getting into her car when it happened originally, denies any numbness or tingling, pain does radiate to the front of hip. She is taking ibuprofen and robaxin and did seem to help but pain will still flare up. Otherwise pt doing well on current tx and no other concerns today. The patient's past medical, surgical, social, and family history as well as his current medications and allergies were reviewed as documented in today's encounter by JESSI Huertas. Previous office notes, labs, imaging and hospital records were reviewed prior to and during encounter.     Current Outpatient Medications on File Prior to Visit   Medication Sig Dispense Refill    levothyroxine (SYNTHROID) 125 MCG tablet Take 1 tablet by mouth daily      atorvastatin (LIPITOR) 20 MG tablet TAKE 1 TABLET BY MOUTH ONE TIME A DAY 90 tablet 3    fluticasone (FLONASE) 50 MCG/ACT nasal spray USE 1 SPRAY IN EACH NOSTRIL DAILY 1 Bottle 3    omeprazole (PRILOSEC) 20 MG delayed release capsule Take 1 capsule by mouth Daily 90 capsule 3    FLUoxetine (PROZAC) 20 MG capsule Take 1 capsule by mouth daily TAKE 1 CAPSULE BY MOUTH ONE TIME A DAY 90 capsule 3    aspirin 81 MG EC tablet Take 81 mg by mouth daily      ibuprofen (ADVIL;MOTRIN) 800 MG tablet TAKE 1 TABLET BY MOUTH THREE TIMES A DAY WITH MEALS 90 tablet 3    vitamin C (ASCORBIC ACID) 500 MG tablet Take 500 mg by mouth daily      zinc gluconate 50 MG tablet Take 50 mg by mouth daily      Cholecalciferol (VITAMIN D) 50 MCG (2000 UT) CAPS capsule Take by mouth      diphenhydrAMINE HCl (BENADRYL ALLERGY PO) Take 1 tablet by mouth as needed      Hormone Cream Base CREA by Does not apply route daily        No current facility-administered medications on file prior to visit. Subjective:     Review of Systems   Musculoskeletal:        Left hip/ buttock pain       Allergies   Allergen Reactions    Cephalexin Rash     Rash when in the sun    Morphine Itching    Percocet [Oxycodone-Acetaminophen] Itching    Seasonal Other (See Comments)     POST NASAL DRIP   ,   Past Medical History:   Diagnosis Date    Anxiety     Cystocele     Gastroesophageal reflux disease without esophagitis 10/24/2018    Hyperlipidemia     Sleep apnea     USES CPAP MACHINE.  Thyroid disease     Urinary incontinence     Uterine fibroid     Vaginal prolapse     Vaginal spotting     Wears glasses     READING   ,   Past Surgical History:   Procedure Laterality Date    BLADDER SUSPENSION  2016    BREAST SURGERY Bilateral 02/2013    breast reduction    CERVICAL CERCLAGE N/A 11/8/2019    SUTURE REPAIR CERVICAL CUP performed by Alan Bojorquez MD at [address]  2007    COLONOSCOPY  7/25/14    normal    CYSTOSCOPY  7/13/15    Robotic cystocele repair, JORGE, lynx sling, sacrocolpopexy    ENDOMETRIAL ABLATION  2009 ?     with bladder sling TVT    ENDOSCOPY, COLON, DIAGNOSTIC  09/15/2017    EGD    HERNIA REPAIR  8145    umbilical    HYSTERECTOMY  12/10/2013    VAGINAL, OVARIES ALSO REMOVED    HYSTERECTOMY, VAGINAL  12/10/2013    RAVH and posterior repair    MT EGD TRANSORAL BIOPSY SINGLE/MULTIPLE N/A 9/15/2017    EGD BIOPSY performed by Pooja Ross DO at [address] [place] [address]  07/14/2020    PARTIAL VAGINECTOMY     VAGINA SURGERY N/A 7/14/2020    PARTIAL VAGINECTOMY WITH ULTRASONIC SCAPEL ([address] W/ LONG HAND PIECE) performed by Shannan Babinski, MD at [place] [address]   ,   Social History     Tobacco Use    Smoking status: Never Smoker    Smokeless tobacco: Never Used   Vaping Use    Vaping Use: Never used   Substance Use Topics    Alcohol use:  Yes     Alcohol/week: 1.0 standard drinks     Types: 1 Glasses of wine per week     Comment: 1 time per month    Drug use: No   ,   Family History   Problem Relation Age of Onset    High Cholesterol Mother     High Blood Pressure Father     Heart Disease Father         stent    Cancer Father         bladder    Breast Cancer Maternal Aunt 79    Heart Disease Maternal Grandfather     Arthritis Maternal Grandfather     Cancer Maternal Grandfather         bone CA    Other Paternal Grandmother         Leukemia    Other Paternal Grandfather         Leukemia    Heart Disease Paternal Grandfather     No Known Problems Sister     No Known Problems Brother    ,   Immunization History   Administered Date(s) Administered    COVID-19, Moderna, PF, 100mcg/0.5mL 09/03/2021    Influenza Virus Vaccine 10/27/2017, 10/23/2018, 10/15/2019, 10/13/2020    Tdap (Boostrix, Adacel) 10/24/2013    Zoster Recombinant (Shingrix) 01/12/2019, 04/07/2019   ,   Health Maintenance   Topic Date Due    COVID-19 Vaccine (2 - Moderna 2-dose series) 10/01/2021    Flu vaccine (1) 09/30/2022 (Originally 9/1/2021)    Hepatitis C screen  10/24/2023 (Originally 1965)    HIV screen  10/24/2023 (Originally 2/7/1978)    A1C test (Diabetic or Prediabetic)  06/22/2022    TSH testing  07/20/2022    Lipid screen  09/15/2022    Breast cancer screen  03/09/2023    DTaP/Tdap/Td vaccine (2 - Td or Tdap) 10/24/2023    Colon cancer screen colonoscopy  07/25/2024    Shingles Vaccine  Completed    Hepatitis A vaccine  Aged Out    Hepatitis B vaccine  Aged Out    Hib vaccine  Aged Out    Meningococcal (ACWY) vaccine  Aged Out    Pneumococcal 0-64 years Vaccine  Aged Out     Objective:     Physical Exam  PHYSICAL EXAMINATION:  [ INSTRUCTIONS:  \"[x]\" Indicates a positive item  \"[]\" Indicates a negative item  -- DELETE ALL ITEMS NOT EXAMINED]  Vital Signs: Not completed due to virtual visit. Constitutional: [x] Appears well-developed and well-nourished [x] No apparent distress                            [] Abnormal-   Mental status  [x] Alert and awake  [x] Oriented to person/place/time [x]Able to follow commands       Eyes:  EOM    [x]  Normal  [] Abnormal-  Sclera  [x]  Normal  [] Abnormal -         Discharge [x]  None visible  [] Abnormal -     HENT:   [x] Normocephalic, atraumatic. [] Abnormal   [x] Mouth/Throat: Mucous membranes are moist.      External Ears [x] Normal  [] Abnormal-      Neck: [x] No visualized mass      Pulmonary/Chest: [x] Respiratory effort normal.  [x] No visualized signs of difficulty breathing or respiratory distress        [] Abnormal-      Neurological:        [x] No Facial Asymmetry (Cranial nerve 7 motor function) (limited exam to video visit)                       [x] No gaze palsy        [] Abnormal-         Skin:                     [x] No significant exanthematous lesions or discoloration noted on facial skin         [] Abnormal-                                  Psychiatric:           [x] Normal Affect [x] No Hallucinations        [] Abnormal-      Other pertinent observable physical exam findings- Patient appears generally well, is speaking full sentences clearly without any observable SOB, no cough, no diaphoresis. - pain to center of left mid buttocks area    Assessment:      Diagnosis Orders   1. Left sided sciatica  predniSONE (DELTASONE) 20 MG tablet     Plan:     - will start prednisone taper dose. - rest, continue to take ibuprofen and robaxin as needed. - she is going to miss work tonight and doesn't return until next Monday, letter given to be off until 10/25/2021.     - Rest of systems unchanged, continue current treatments. - Medications, labs, diagnostic studies, consultations and follow-up as documented in this encounter.  Rest of systems unchanged, continue current treatments    Lizy Horowitz is a 60 y.o. female being evaluated by a Virtual Visit (video visit) encounter to address concerns as mentioned above. A caregiver was present when appropriate. Due to this being a TeleHealth encounter (During FBBIJ-87 public health emergency), evaluation of the following organ systems was limited: Vitals/Constitutional/EENT/Resp/CV/GI//MS/Neuro/Skin/Heme-Lymph-Imm. Pursuant to the emergency declaration under the [address] [place] [address], [address] [place] [address] and the [employer] and [employer] Act, this Virtual Visit was conducted with patient's (and/or legal guardian's) consent, to reduce the patient's risk of exposure to COVID-19 and provide necessary medical care. The patient (and/or legal guardian) has also been advised to contact this office for worsening conditions or problems, and seek emergency medical treatment and/or call 911 if deemed necessary. Patient identification was verified at the start of the visit: Yes    Total time spent for this encounter: Not billed by time    - pt verbalized understanding plan of care. Services were provided through a video synchronous discussion virtually to substitute for in-person clinic visit. Patient and provider were located at their individual homes. On this date 10/20/2021 I have spent 20 minutes reviewing previous notes, test results and face to face with the patient discussing the diagnosis and importance of compliance with the treatment plan as well as documenting on the day of the visit. --CHRIS Moss CNP on 10/20/2021 at 1:22 PM    An electronic signature was used to authenticate this note.     VALDEMAR Moss

## 2021-10-20 NOTE — PATIENT INSTRUCTIONS
Steroids are good medications, but do have side effects I would like to be aware of: They can cause your heart to race, can cause a hot flash sensation, can cause a short term increase in your appetite and can cause insomnia if you take them too late so I would not recommend taking them later than 5-6 pm. Last thing to keep in mind is they taste terrible so please take them with something other than plain water.

## 2021-11-30 ENCOUNTER — HOSPITAL ENCOUNTER (OUTPATIENT)
Age: 56
Discharge: HOME | End: 2021-11-30
Payer: COMMERCIAL

## 2021-11-30 LAB
THYROXINE, FREE: 1.43 NG/DL (ref 0.93–1.7)
TSH SERPL DL<=0.05 MIU/L-ACNC: 0.16 MIU/L (ref 0.3–5)
VITAMIN D 25-HYDROXY: 43.5 NG/ML (ref 30–100)

## 2021-11-30 PROCEDURE — 82306 VITAMIN D 25 HYDROXY: CPT

## 2021-11-30 PROCEDURE — 84439 ASSAY OF FREE THYROXINE: CPT

## 2021-11-30 PROCEDURE — 36415 COLL VENOUS BLD VENIPUNCTURE: CPT

## 2021-11-30 PROCEDURE — 84443 ASSAY THYROID STIM HORMONE: CPT

## 2021-12-13 ENCOUNTER — OFFICE VISIT (OUTPATIENT)
Dept: FAMILY MEDICINE CLINIC | Age: 56
End: 2021-12-13
Payer: COMMERCIAL

## 2021-12-13 VITALS
WEIGHT: 209 LBS | BODY MASS INDEX: 34.82 KG/M2 | OXYGEN SATURATION: 98 % | DIASTOLIC BLOOD PRESSURE: 78 MMHG | HEIGHT: 65 IN | RESPIRATION RATE: 16 BRPM | TEMPERATURE: 98.5 F | SYSTOLIC BLOOD PRESSURE: 116 MMHG | HEART RATE: 78 BPM

## 2021-12-13 DIAGNOSIS — K21.9 GASTROESOPHAGEAL REFLUX DISEASE WITHOUT ESOPHAGITIS: ICD-10-CM

## 2021-12-13 DIAGNOSIS — E03.9 HYPOTHYROIDISM, UNSPECIFIED TYPE: ICD-10-CM

## 2021-12-13 DIAGNOSIS — Z23 NEED FOR VACCINATION: ICD-10-CM

## 2021-12-13 DIAGNOSIS — F41.9 ANXIETY: ICD-10-CM

## 2021-12-13 DIAGNOSIS — E78.2 MIXED HYPERLIPIDEMIA: Primary | ICD-10-CM

## 2021-12-13 DIAGNOSIS — R73.9 HYPERGLYCEMIA: ICD-10-CM

## 2021-12-13 PROCEDURE — 90471 IMMUNIZATION ADMIN: CPT | Performed by: FAMILY MEDICINE

## 2021-12-13 PROCEDURE — 90674 CCIIV4 VAC NO PRSV 0.5 ML IM: CPT | Performed by: FAMILY MEDICINE

## 2021-12-13 PROCEDURE — 99214 OFFICE O/P EST MOD 30 MIN: CPT | Performed by: FAMILY MEDICINE

## 2021-12-13 RX ORDER — LEVOTHYROXINE SODIUM 112 UG/1
1 TABLET ORAL DAILY
COMMUNITY
Start: 2021-12-02

## 2021-12-13 RX ORDER — CHOLECALCIFEROL (VITAMIN D3) 125 MCG
1 CAPSULE ORAL DAILY
COMMUNITY

## 2021-12-13 ASSESSMENT — ENCOUNTER SYMPTOMS
COUGH: 0
SHORTNESS OF BREATH: 0
SORE THROAT: 0
DIARRHEA: 0
RHINORRHEA: 0
NAUSEA: 0
BACK PAIN: 0
ABDOMINAL PAIN: 0
CHEST TIGHTNESS: 0
CONSTIPATION: 0
ABDOMINAL DISTENTION: 0
VOMITING: 0

## 2021-12-13 NOTE — PROGRESS NOTES
Vaccine Information Sheet, \"Influenza - Inactivated\"  given to Hillary Lewis, or parent/legal guardian of  Hillary Lewis and verbalized understanding. Patient responses:    Have you ever had a reaction to a flu vaccine? No  Are you able to eat eggs without adverse effects? Yes  Do you have any current illness? No  Have you ever had Guillian [place] Syndrome? No    Flu vaccine given per order. Please see immunization tab.

## 2021-12-13 NOTE — PROGRESS NOTES
Tere Miranda MD    [address] [place] [address] FAMILY [facility]  [id number]  [address] [address]  Dept: [phone]  Dept Fax: [phone]     Patient ID: Leo Oneil is a 60 y.o. female. HPI    Established patient here today for f/u on chronic medical problems, go over labs and/or diagnostic studies, and medication refills. Pt denies any fever or chills. Pt today denies any HA, chest pain, or SOB. Pt denies any N/V/D/C or abdominal pain. Pt with occasional heartburn, but stable on meds. Pt states mood is stable on meds. Otherwise pt doing well on current tx and no other concerns today. The patient's past medical, surgical, social, and family history as well as his current medications and allergies were reviewed as documented in today's encounter. My previous office notes, consult notes, labs and diagnostic studies were reviewed prior to and during encounter. Current Outpatient Medications on File Prior to Visit   Medication Sig Dispense Refill    levothyroxine (SYNTHROID) 112 MCG tablet Take 1 tablet by mouth daily      Phentermine-Topiramate 11.25-69 MG CP24 Take 1 capsule by mouth daily.       Cholecalciferol (VITAMIN D) 125 MCG (5000 UT) CAPS Take 1 capsule by mouth daily      atorvastatin (LIPITOR) 20 MG tablet TAKE 1 TABLET BY MOUTH ONE TIME A DAY 90 tablet 3    fluticasone (FLONASE) 50 MCG/ACT nasal spray USE 1 SPRAY IN EACH NOSTRIL DAILY 1 Bottle 3    omeprazole (PRILOSEC) 20 MG delayed release capsule Take 1 capsule by mouth Daily 90 capsule 3    FLUoxetine (PROZAC) 20 MG capsule Take 1 capsule by mouth daily TAKE 1 CAPSULE BY MOUTH ONE TIME A DAY 90 capsule 3    aspirin 81 MG EC tablet Take 81 mg by mouth daily      ibuprofen (ADVIL;MOTRIN) 800 MG tablet TAKE 1 TABLET BY MOUTH THREE TIMES A DAY WITH MEALS 90 tablet 3    vitamin C (ASCORBIC ACID) 500 MG tablet Take 500 mg by mouth daily      zinc gluconate 50 MG tablet Take 50 mg by mouth daily      diphenhydrAMINE HCl (BENADRYL ALLERGY PO) Take 1 tablet by mouth as needed       No current facility-administered medications on file prior to visit. Subjective:     Review of Systems   Constitutional: Negative for appetite change, fatigue and fever. HENT: Negative for congestion, ear pain, rhinorrhea and sore throat. Respiratory: Negative for cough, chest tightness and shortness of breath. Cardiovascular: Negative for chest pain and palpitations. Gastrointestinal: Negative for abdominal distention, abdominal pain, constipation, diarrhea, nausea and vomiting. Occ heartburn   Genitourinary: Negative for difficulty urinating and dysuria. Musculoskeletal: Negative for arthralgias, back pain and myalgias. Skin: Negative for rash. Neurological: Negative for dizziness, weakness, light-headedness and headaches. Hematological: Negative for adenopathy. Psychiatric/Behavioral: Negative for behavioral problems and sleep disturbance. The patient is nervous/anxious (stable). Objective:     Physical Exam    Assessment:      Diagnosis Orders   1. Mixed hyperlipidemia  Lipid Panel   2. Hyperglycemia  CBC    Comprehensive Metabolic Panel    Hemoglobin A1C   3. Hypothyroidism, unspecified type     4. Gastroesophageal reflux disease without esophagitis     5. Anxiety     6.  Need for vaccination  INFLUENZA, MDCK QUADV, 2 YRS AND OLDER, IM, PF, PREFILL SYR OR SDV, 0.5ML (FLUCELVAX QUADV, PF)         Plan:     Orders Placed This Encounter   Procedures    INFLUENZA, MDCK QUADV, 2 YRS AND OLDER, IM, PF, PREFILL SYR OR SDV, 0.5ML (FLUCELVAX QUADV, PF)    CBC     Standing Status:   Future     Standing Expiration Date:   12/13/2022    Comprehensive Metabolic Panel     Standing Status:   Future     Standing Expiration Date:   12/13/2022    Lipid Panel     Standing Status:   Future     Standing Expiration Date:   12/13/2022     Order Specific Question:   Is Patient Fasting?/# of Hours     Answer:   8-10 Hours    Hemoglobin A1C     Standing Status:   Future     Standing Expiration Date:   12/13/2022      Will cont with current treatment as pt is currently stable on current treatment    Will cont with low fat/chol diet and Lipitor as ordered    Continue to watch carbs secondary to increased Triglycerides and BS    Will cont with the Synthroid as prescribed as the thyroid levels are stable and does follow with Dr. Gabino Escobar for her dosing and labs    Will cont with the Prozac as prescribed as she is doing well    Will cont to follow with Dr. Gabino Escobar as instructed and he does have her on the Phentermine-Topiramate for weight loss    Rest of systems unchanged, continue current treatments. Medications, labs, diagnostic studies, consultations and follow-up as documented in this encounter. Rest of systems unchanged, continue current treatments    On this date December 13, 2021,  I have spent greater than 50% of visit reviewing previous notes, test results and face to face with the patient discussing the diagnoses, importance of compliance with the treatment plan, counseling, coordinating care as well as documenting on the day of the visit. Tere Sosa MD

## 2022-02-14 ENCOUNTER — TELEPHONE (OUTPATIENT)
Dept: FAMILY MEDICINE CLINIC | Age: 57
End: 2022-02-14

## 2022-02-14 RX ORDER — VALACYCLOVIR HYDROCHLORIDE 1 G/1
2000 TABLET, FILM COATED ORAL 2 TIMES DAILY
Qty: 4 TABLET | Refills: 0 | Status: SHIPPED | OUTPATIENT
Start: 2022-02-14 | End: 2022-10-20 | Stop reason: SDUPTHER

## 2022-03-08 ENCOUNTER — HOSPITAL ENCOUNTER (OUTPATIENT)
Age: 57
Discharge: HOME | End: 2022-03-08
Payer: COMMERCIAL

## 2022-03-08 LAB
THYROXINE, FREE: 1.39 NG/DL (ref 0.93–1.7)
TSH SERPL DL<=0.05 MIU/L-ACNC: 0.05 MIU/L (ref 0.3–5)

## 2022-03-08 PROCEDURE — 84443 ASSAY THYROID STIM HORMONE: CPT

## 2022-03-08 PROCEDURE — 84439 ASSAY OF FREE THYROXINE: CPT

## 2022-03-08 PROCEDURE — 36415 COLL VENOUS BLD VENIPUNCTURE: CPT

## 2022-03-14 ENCOUNTER — HOSPITAL ENCOUNTER (OUTPATIENT)
Age: 57
Setting detail: SPECIMEN
Discharge: HOME | End: 2022-03-14

## 2022-03-14 ENCOUNTER — OFFICE VISIT (OUTPATIENT)
Dept: FAMILY MEDICINE CLINIC | Age: 57
End: 2022-03-14
Payer: COMMERCIAL

## 2022-03-14 VITALS
HEART RATE: 84 BPM | BODY MASS INDEX: 32.35 KG/M2 | OXYGEN SATURATION: 98 % | WEIGHT: 193 LBS | SYSTOLIC BLOOD PRESSURE: 118 MMHG | DIASTOLIC BLOOD PRESSURE: 76 MMHG

## 2022-03-14 DIAGNOSIS — Z12.31 ENCOUNTER FOR SCREENING MAMMOGRAM FOR HIGH-RISK PATIENT: ICD-10-CM

## 2022-03-14 DIAGNOSIS — E03.9 HYPOTHYROIDISM, UNSPECIFIED TYPE: ICD-10-CM

## 2022-03-14 DIAGNOSIS — R73.9 HYPERGLYCEMIA: ICD-10-CM

## 2022-03-14 DIAGNOSIS — E78.2 MIXED HYPERLIPIDEMIA: Primary | ICD-10-CM

## 2022-03-14 DIAGNOSIS — E78.2 MIXED HYPERLIPIDEMIA: ICD-10-CM

## 2022-03-14 DIAGNOSIS — F41.9 ANXIETY: ICD-10-CM

## 2022-03-14 DIAGNOSIS — K21.9 GASTROESOPHAGEAL REFLUX DISEASE WITHOUT ESOPHAGITIS: ICD-10-CM

## 2022-03-14 PROBLEM — F15.929 OTHER STIMULANT USE, UNSPECIFIED WITH INTOXICATION, UNSPECIFIED (HCC): Status: ACTIVE | Noted: 2022-03-14

## 2022-03-14 PROBLEM — R63.5 ABNORMAL WEIGHT GAIN: Status: ACTIVE | Noted: 2022-03-14

## 2022-03-14 PROBLEM — N60.09 SOLITARY CYST OF BREAST: Status: ACTIVE | Noted: 2022-03-14

## 2022-03-14 PROBLEM — R31.29 MICROSCOPIC HEMATURIA: Status: ACTIVE | Noted: 2022-03-14

## 2022-03-14 PROBLEM — N35.919 URETHRAL STRICTURE: Status: ACTIVE | Noted: 2022-03-14

## 2022-03-14 PROBLEM — N93.9 VAGINAL BLEEDING: Status: ACTIVE | Noted: 2022-03-14

## 2022-03-14 PROBLEM — B37.9 CANDIDIASIS: Status: ACTIVE | Noted: 2022-03-14

## 2022-03-14 PROBLEM — R10.2 PELVIC AND PERINEAL PAIN: Status: ACTIVE | Noted: 2022-03-14

## 2022-03-14 PROBLEM — F10.959 ALCOHOL-INDUCED PSYCHOSIS (HCC): Status: ACTIVE | Noted: 2022-03-14

## 2022-03-14 PROBLEM — R82.89: Status: ACTIVE | Noted: 2022-03-14

## 2022-03-14 PROBLEM — R35.0 FREQUENCY OF MICTURITION: Status: ACTIVE | Noted: 2022-03-14

## 2022-03-14 PROBLEM — N39.3 STRESS INCONTINENCE OF URINE: Status: ACTIVE | Noted: 2022-03-14

## 2022-03-14 LAB
ESTIMATED AVERAGE GLUCOSE: 120 MG/DL
HBA1C MFR BLD: 5.8 % (ref 4–6)
HCT VFR BLD CALC: 41.3 % (ref 36.3–47.1)
HEMOGLOBIN: 13.6 G/DL (ref 11.9–15.1)
MCH RBC QN AUTO: 29.4 PG (ref 25.2–33.5)
MCHC RBC AUTO-ENTMCNC: 32.9 G/DL (ref 28.4–34.8)
MCV RBC AUTO: 89.2 FL (ref 82.6–102.9)
NRBC AUTOMATED: 0 PER 100 WBC
PDW BLD-RTO: 12.9 % (ref 11.8–14.4)
PLATELET # BLD: 319 K/UL (ref 138–453)
PMV BLD AUTO: 10 FL (ref 8.1–13.5)
RBC # BLD: 4.63 M/UL (ref 3.95–5.11)
WBC # BLD: 6.9 K/UL (ref 3.5–11.3)

## 2022-03-14 PROCEDURE — 99214 OFFICE O/P EST MOD 30 MIN: CPT | Performed by: FAMILY MEDICINE

## 2022-03-14 RX ORDER — PHENTERMINE AND TOPIRAMATE 15; 92 MG/1; MG/1
CAPSULE, EXTENDED RELEASE ORAL
COMMUNITY

## 2022-03-14 ASSESSMENT — ENCOUNTER SYMPTOMS
SHORTNESS OF BREATH: 0
RHINORRHEA: 0
CHEST TIGHTNESS: 0
NAUSEA: 0
ABDOMINAL DISTENTION: 0
CONSTIPATION: 0
ABDOMINAL PAIN: 0
COUGH: 0
DIARRHEA: 0
VOMITING: 0
SORE THROAT: 0
BACK PAIN: 0

## 2022-03-14 ASSESSMENT — PATIENT HEALTH QUESTIONNAIRE - PHQ9
SUM OF ALL RESPONSES TO PHQ QUESTIONS 1-9: 0
SUM OF ALL RESPONSES TO PHQ9 QUESTIONS 1 & 2: 0
2. FEELING DOWN, DEPRESSED OR HOPELESS: 0
1. LITTLE INTEREST OR PLEASURE IN DOING THINGS: 0
SUM OF ALL RESPONSES TO PHQ QUESTIONS 1-9: 0

## 2022-03-14 NOTE — PROGRESS NOTES
Tere Orona MD    [address] [place] [address] [facility]  [id number]  [address] [address]  Dept: [phone]  Dept Fax: [phone]     Patient ID: Margoth Flores is a 59 y.o. female. Established patient here today for f/u on chronic medical problems, go over labs and/or diagnostic studies, and medication refills. Pt denies any fever or chills. Pt today denies any HA, chest pain, or SOB. Pt denies any N/V/D/C or abdominal pain. Pt with occasional heartburn, but stable on meds. Pt states mood is stable on meds. She states she is going through a divorce and she is doing OK with it, but her  is not. Otherwise pt doing well on current tx and no other concerns today. The patient's past medical, surgical, social, and family history as well as his current medications and allergies were reviewed as documented in today's encounter. My previous office notes, consult notes, labs and diagnostic studies were reviewed prior to and during encounter. Current Outpatient Medications on File Prior to Visit   Medication Sig Dispense Refill    Phentermine-Topiramate (QSYMIA) 15-92 MG CP24 Take by mouth.       levothyroxine (SYNTHROID) 112 MCG tablet Take 1 tablet by mouth daily      Cholecalciferol (VITAMIN D) 125 MCG (5000 UT) CAPS Take 1 capsule by mouth daily      atorvastatin (LIPITOR) 20 MG tablet TAKE 1 TABLET BY MOUTH ONE TIME A DAY 90 tablet 3    fluticasone (FLONASE) 50 MCG/ACT nasal spray USE 1 SPRAY IN EACH NOSTRIL DAILY 1 Bottle 3    omeprazole (PRILOSEC) 20 MG delayed release capsule Take 1 capsule by mouth Daily 90 capsule 3    FLUoxetine (PROZAC) 20 MG capsule Take 1 capsule by mouth daily TAKE 1 CAPSULE BY MOUTH ONE TIME A DAY 90 capsule 3    aspirin 81 MG EC tablet Take 81 mg by mouth daily      vitamin C (ASCORBIC ACID) 500 MG tablet Take 500 mg by mouth daily      diphenhydrAMINE HCl (BENADRYL ALLERGY PO) Take 1 tablet by mouth as needed      zinc gluconate 50 MG tablet Take 50 mg by mouth daily (Patient not taking: Reported on 3/14/2022)       No current facility-administered medications on file prior to visit. Subjective:     Review of Systems   Constitutional: Negative for appetite change, fatigue and fever. HENT: Negative for congestion, ear pain, rhinorrhea and sore throat. Respiratory: Negative for cough, chest tightness and shortness of breath. Cardiovascular: Negative for chest pain and palpitations. Gastrointestinal: Negative for abdominal distention, abdominal pain, constipation, diarrhea, nausea and vomiting. Occ heartburn   Genitourinary: Negative for difficulty urinating and dysuria. Musculoskeletal: Negative for arthralgias, back pain and myalgias. Skin: Negative for rash. Neurological: Negative for dizziness, weakness, light-headedness and headaches. Hematological: Negative for adenopathy. Psychiatric/Behavioral: Negative for behavioral problems and sleep disturbance. The patient is nervous/anxious (stable). Objective:     Physical Exam  Vitals reviewed. Constitutional:       General: She is not in acute distress. Appearance: She is well-developed. HENT:      Head: Normocephalic and atraumatic. Right Ear: External ear normal.      Left Ear: External ear normal.      Nose: Nose normal.      Mouth/Throat:      Pharynx: No oropharyngeal exudate. Eyes:      Conjunctiva/sclera: Conjunctivae normal.      Pupils: Pupils are equal, round, and reactive to light. Cardiovascular:      Rate and Rhythm: Normal rate and regular rhythm. Heart sounds: Normal heart sounds. No murmur heard. Pulmonary:      Effort: Pulmonary effort is normal. No respiratory distress. Breath sounds: Normal breath sounds. No wheezing. Chest:      Chest wall: No tenderness. Abdominal:      General: Bowel sounds are normal. There is no distension.       Palpations: Abdomen is soft. There is no mass. Tenderness: There is no abdominal tenderness. Musculoskeletal:         General: No tenderness. Normal range of motion. Cervical back: Normal range of motion. Lymphadenopathy:      Cervical: No cervical adenopathy. Skin:     Findings: No rash. Neurological:      Mental Status: She is alert and oriented to person, place, and time. Deep Tendon Reflexes: Reflexes are normal and symmetric. Psychiatric:         Behavior: Behavior normal.         Assessment:      Diagnosis Orders   1. Mixed hyperlipidemia  Lipid Panel   2. Encounter for screening mammogram for high-risk patient  LUCY DIGITAL SCREEN W OR WO CAD BILATERAL   3. Hyperglycemia  CBC    Comprehensive Metabolic Panel    Hemoglobin A1C   4. Hypothyroidism, unspecified type     5. Gastroesophageal reflux disease without esophagitis     6.  Anxiety           Plan:     Orders Placed This Encounter   Procedures    LUCY DIGITAL SCREEN W OR WO CAD BILATERAL     Standing Status:   Future     Standing Expiration Date:   5/10/2023     Order Specific Question:   Reason for exam:     Answer:   screening    CBC     Standing Status:   Future     Standing Expiration Date:   3/14/2023    Comprehensive Metabolic Panel     Standing Status:   Future     Standing Expiration Date:   3/14/2023    Lipid Panel     Standing Status:   Future     Standing Expiration Date:   3/14/2023     Order Specific Question:   Is Patient Fasting?/# of Hours     Answer:   8-10 Hours    Hemoglobin A1C     Standing Status:   Future     Standing Expiration Date:   3/14/2023      Will cont with current treatment as pt is currently stable on current treatment    Will cont with low fat/chol diet and Lipitor as ordered    Continue to watch carbs secondary to increased Triglycerides and BS    Will cont with the Synthroid as prescribed and she does follow with Dr. Gil Rascon for her dosing and labs    Will cont with the Prozac as prescribed as she is doing well    Will cont to follow with Dr. Kehinde Gonzalez as instructed and he does have her on the Phentermine-Topiramate for weight loss    Will call her with result of the labs she just had done this morning    Rest of systems unchanged, continue current treatments. Medications, labs, diagnostic studies, consultations and follow-up as documented in this encounter. Rest of systems unchanged, continue current treatments    On this date March 14, 2022,  I have spent greater than 50% of visit reviewing previous notes, test results and face to face with the patient discussing the diagnoses, importance of compliance with the treatment plan, counseling, coordinating care as well as documenting on the day of the visit. Tere Mohamud MD

## 2022-03-15 LAB
ALBUMIN SERPL-MCNC: 4.2 G/DL (ref 3.5–5.2)
ALBUMIN/GLOBULIN RATIO: 1.8 (ref 1–2.5)
ALP BLD-CCNC: 58 U/L (ref 35–104)
ALT SERPL-CCNC: 21 U/L (ref 5–33)
ANION GAP SERPL CALCULATED.3IONS-SCNC: 18 MMOL/L (ref 9–17)
AST SERPL-CCNC: 18 U/L
BILIRUB SERPL-MCNC: 0.27 MG/DL (ref 0.3–1.2)
BUN BLDV-MCNC: 9 MG/DL (ref 6–20)
CALCIUM SERPL-MCNC: 9.8 MG/DL (ref 8.6–10.4)
CHLORIDE BLD-SCNC: 108 MMOL/L (ref 98–107)
CHOLESTEROL/HDL RATIO: 3.5
CHOLESTEROL: 156 MG/DL
CO2: 22 MMOL/L (ref 20–31)
CREAT SERPL-MCNC: 0.78 MG/DL (ref 0.5–0.9)
GFR AFRICAN AMERICAN: >60 ML/MIN
GFR NON-AFRICAN AMERICAN: >60 ML/MIN
GFR SERPL CREATININE-BSD FRML MDRD: ABNORMAL ML/MIN/{1.73_M2}
GLUCOSE BLD-MCNC: 104 MG/DL (ref 70–99)
HDLC SERPL-MCNC: 44 MG/DL
LDL CHOLESTEROL: 90 MG/DL (ref 0–130)
POTASSIUM SERPL-SCNC: 4.4 MMOL/L (ref 3.7–5.3)
SODIUM BLD-SCNC: 148 MMOL/L (ref 135–144)
TOTAL PROTEIN: 6.6 G/DL (ref 6.4–8.3)
TRIGL SERPL-MCNC: 109 MG/DL

## 2022-03-30 ENCOUNTER — HOSPITAL ENCOUNTER (OUTPATIENT)
Dept: WOMENS IMAGING | Age: 57
Discharge: HOME | End: 2022-04-01
Payer: COMMERCIAL

## 2022-03-30 DIAGNOSIS — Z12.31 ENCOUNTER FOR SCREENING MAMMOGRAM FOR HIGH-RISK PATIENT: ICD-10-CM

## 2022-03-30 PROCEDURE — 77063 BREAST TOMOSYNTHESIS BI: CPT

## 2022-05-16 ENCOUNTER — HOSPITAL ENCOUNTER (OUTPATIENT)
Age: 57
Discharge: HOME | End: 2022-05-16
Payer: COMMERCIAL

## 2022-05-16 LAB
HCT VFR BLD CALC: 37.8 % (ref 36–46)
HEMOGLOBIN: 13 G/DL (ref 12–16)
MCH RBC QN AUTO: 29.7 PG (ref 26–34)
MCHC RBC AUTO-ENTMCNC: 34.3 G/DL (ref 31–37)
MCV RBC AUTO: 86.5 FL (ref 80–100)
PDW BLD-RTO: 13.9 % (ref 11.5–14.9)
PLATELET # BLD: 272 K/UL (ref 150–450)
PMV BLD AUTO: 7.2 FL (ref 6–12)
RBC # BLD: 4.37 M/UL (ref 4–5.2)
WBC # BLD: 6.1 K/UL (ref 3.5–11)

## 2022-05-16 PROCEDURE — 85027 COMPLETE CBC AUTOMATED: CPT

## 2022-05-16 PROCEDURE — 36415 COLL VENOUS BLD VENIPUNCTURE: CPT

## 2022-05-16 PROCEDURE — 93005 ELECTROCARDIOGRAM TRACING: CPT | Performed by: PLASTIC SURGERY

## 2022-05-17 LAB
EKG ATRIAL RATE: 67 BPM
EKG P AXIS: 49 DEGREES
EKG P-R INTERVAL: 162 MS
EKG Q-T INTERVAL: 434 MS
EKG QRS DURATION: 116 MS
EKG QTC CALCULATION (BAZETT): 458 MS
EKG R AXIS: -25 DEGREES
EKG T AXIS: 53 DEGREES
EKG VENTRICULAR RATE: 67 BPM

## 2022-05-17 PROCEDURE — 93010 ELECTROCARDIOGRAM REPORT: CPT | Performed by: INTERNAL MEDICINE

## 2022-06-02 ENCOUNTER — HOSPITAL ENCOUNTER (OUTPATIENT)
Age: 57
Discharge: HOME | End: 2022-06-02
Payer: COMMERCIAL

## 2022-06-02 LAB
THYROXINE, FREE: 1.5 NG/DL (ref 0.93–1.7)
TSH SERPL DL<=0.05 MIU/L-ACNC: 0.14 UIU/ML (ref 0.3–5)

## 2022-06-02 PROCEDURE — 84443 ASSAY THYROID STIM HORMONE: CPT

## 2022-06-02 PROCEDURE — 36415 COLL VENOUS BLD VENIPUNCTURE: CPT

## 2022-06-02 PROCEDURE — 84439 ASSAY OF FREE THYROXINE: CPT

## 2022-07-19 ENCOUNTER — HOSPITAL ENCOUNTER (OUTPATIENT)
Age: 57
Discharge: HOME | End: 2022-07-19
Payer: COMMERCIAL

## 2022-07-19 LAB
ESTRADIOL LEVEL: 64.3 PG/ML (ref 5–50)
PROGESTERONE LEVEL: 0.27 NG/ML
TESTOSTERONE TOTAL: 11 NG/DL (ref 20–70)

## 2022-07-19 PROCEDURE — 84403 ASSAY OF TOTAL TESTOSTERONE: CPT

## 2022-07-19 PROCEDURE — 36415 COLL VENOUS BLD VENIPUNCTURE: CPT

## 2022-07-19 PROCEDURE — 82670 ASSAY OF TOTAL ESTRADIOL: CPT

## 2022-07-19 PROCEDURE — 84144 ASSAY OF PROGESTERONE: CPT

## 2022-07-26 DIAGNOSIS — F41.9 ANXIETY: ICD-10-CM

## 2022-07-26 RX ORDER — FLUOXETINE HYDROCHLORIDE 20 MG/1
CAPSULE ORAL
Qty: 90 CAPSULE | Refills: 3 | Status: SHIPPED | OUTPATIENT
Start: 2022-07-26

## 2022-07-26 RX ORDER — OMEPRAZOLE 20 MG/1
CAPSULE, DELAYED RELEASE ORAL
Qty: 90 CAPSULE | Refills: 3 | Status: SHIPPED | OUTPATIENT
Start: 2022-07-26

## 2022-09-01 ENCOUNTER — OFFICE VISIT (OUTPATIENT)
Dept: ORTHOPEDIC SURGERY | Age: 57
End: 2022-09-01
Payer: COMMERCIAL

## 2022-09-01 VITALS — HEIGHT: 66 IN | WEIGHT: 166 LBS | BODY MASS INDEX: 26.68 KG/M2

## 2022-09-01 DIAGNOSIS — G89.29 CHRONIC PAIN OF LEFT KNEE: ICD-10-CM

## 2022-09-01 DIAGNOSIS — M25.562 CHRONIC PAIN OF LEFT KNEE: ICD-10-CM

## 2022-09-01 DIAGNOSIS — M17.10 PATELLOFEMORAL ARTHRITIS: Primary | ICD-10-CM

## 2022-09-01 PROCEDURE — 20610 DRAIN/INJ JOINT/BURSA W/O US: CPT | Performed by: PHYSICIAN ASSISTANT

## 2022-09-01 RX ORDER — BUPIVACAINE HYDROCHLORIDE 5 MG/ML
2 INJECTION, SOLUTION PERINEURAL ONCE
Status: COMPLETED | OUTPATIENT
Start: 2022-09-01 | End: 2022-09-01

## 2022-09-01 RX ORDER — LIDOCAINE HYDROCHLORIDE 10 MG/ML
2 INJECTION, SOLUTION INFILTRATION; PERINEURAL ONCE
Status: COMPLETED | OUTPATIENT
Start: 2022-09-01 | End: 2022-09-01

## 2022-09-01 RX ORDER — BETAMETHASONE SODIUM PHOSPHATE AND BETAMETHASONE ACETATE 3; 3 MG/ML; MG/ML
12 INJECTION, SUSPENSION INTRA-ARTICULAR; INTRALESIONAL; INTRAMUSCULAR; SOFT TISSUE ONCE
Status: SHIPPED | OUTPATIENT
Start: 2022-09-01

## 2022-09-01 RX ORDER — TRIAMCINOLONE ACETONIDE 40 MG/ML
40 INJECTION, SUSPENSION INTRA-ARTICULAR; INTRAMUSCULAR ONCE
Status: COMPLETED | OUTPATIENT
Start: 2022-09-01 | End: 2022-09-01

## 2022-09-01 RX ADMIN — LIDOCAINE HYDROCHLORIDE 2 ML: 10 INJECTION, SOLUTION INFILTRATION; PERINEURAL at 08:38

## 2022-09-01 RX ADMIN — BUPIVACAINE HYDROCHLORIDE 10 MG: 5 INJECTION, SOLUTION PERINEURAL at 08:37

## 2022-09-01 RX ADMIN — TRIAMCINOLONE ACETONIDE 40 MG: 40 INJECTION, SUSPENSION INTRA-ARTICULAR; INTRAMUSCULAR at 09:25

## 2022-09-01 NOTE — PROGRESS NOTES
[facility], [address]           Dept Phone: [phone]           Dept Fax:  [phone] [address] [facility]            [address] [place]          Dept Phone: [phone]           Dept Fax:  [phone]      Chief Compliant:  Chief Complaint   Patient presents with    Knee Pain     left        History of Present Illness: This is a 59 y.o. female who presents to the clinic today for evaluation of had concerns including Knee Pain (left). Ms. Leonardo Rodriguez is a 53-year-old female who returns today for reevaluation of left knee pain. Patient was seen in June 2021 for similar left knee pain found to have patellofemoral OA underwent a Kenalog injection at that time which she prior reports provided significant relief of pain for nearly a year. Patient reports her pain did gradually begin end of May early June does not recall any specific injury or trauma but does believe she may have \"tweaked it\" at that time. She denies any knee joint warmth, redness, fever or chills. She does note intermittent swelling that seems to be activity related.            Past History:    Current Outpatient Medications:     FLUoxetine (PROZAC) 20 MG capsule, TAKE 1 CAPSULE BY MOUTH ONE TIME A DAY, Disp: 90 capsule, Rfl: 3    omeprazole (PRILOSEC) 20 MG delayed release capsule, TAKE 1 CAPSULE BY MOUTH ONE TIME A DAY, Disp: 90 capsule, Rfl: 3    Phentermine-Topiramate (QSYMIA) 15-92 MG CP24, Take by mouth., Disp: , Rfl:     levothyroxine (SYNTHROID) 112 MCG tablet, Take 1 tablet by mouth daily, Disp: , Rfl:     Cholecalciferol (VITAMIN D) 125 MCG (5000 UT) CAPS, Take 1 capsule by mouth daily, Disp: , Rfl:     atorvastatin (LIPITOR) 20 MG tablet, TAKE 1 TABLET BY MOUTH ONE TIME A DAY, Disp: 90 tablet, Rfl: 3    fluticasone (FLONASE) 50 MCG/ACT nasal spray, USE 1 SPRAY IN EACH NOSTRIL DAILY, Disp: 1 Bottle, Rfl: 3    aspirin 81 MG EC tablet, Take 81 mg by mouth daily, Disp: , Rfl:     vitamin C (ASCORBIC ACID) 500 MG tablet, Take 500 mg by mouth daily, Disp: , Rfl:     zinc gluconate 50 MG tablet, Take 50 mg by mouth daily (Patient not taking: Reported on 3/14/2022), Disp: , Rfl:     diphenhydrAMINE HCl (BENADRYL ALLERGY PO), Take 1 tablet by mouth as needed, Disp: , Rfl:   Allergies   Allergen Reactions    Other      Other reaction(s): Rash while in sun, Itching  Other reaction(s): Itching;    Cephalexin Rash     Rash when in the sun    Morphine Itching    Percocet [Oxycodone-Acetaminophen] Itching    Seasonal Other (See Comments)     POST NASAL DRIP  Other reaction(s): Post Nasal Drip     Social History     Socioeconomic History    Marital status:      Spouse name: Not on file    Number of children: Not on file    Years of education: Not on file    Highest education level: Not on file   Occupational History    Not on file   Tobacco Use    Smoking status: Never    Smokeless tobacco: Never   Vaping Use    Vaping Use: Never used   Substance and Sexual Activity    Alcohol use:  Yes     Alcohol/week: 1.0 standard drink     Types: 1 Glasses of wine per week     Comment: 1 time per month    Drug use: No    Sexual activity: Not Currently     Partners: Male   Other Topics Concern    Not on file   Social History Narrative    Not on file     Social Determinants of Health     Financial Resource Strain: Low Risk     Difficulty of Paying Living Expenses: Not hard at all   Food Insecurity: No Food Insecurity    Worried About Running Out of Food in the Last Year: Never true    Ran Out of Food in the Last Year: Never true   Transportation Needs: Not on file   Physical Activity: Not on file   Stress: Not on file   Social Connections: Not on file   Intimate Partner Violence: Not on file   Housing Stability: Not on file     Past Medical History:   Diagnosis Date    Anxiety Cystocele     Gastroesophageal reflux disease without esophagitis 10/24/2018    Hyperlipidemia     Sleep apnea     USES CPAP MACHINE. Thyroid disease     Urinary incontinence     Uterine fibroid     Vaginal prolapse     Vaginal spotting     Wears glasses     READING     Past Surgical History:   Procedure Laterality Date    BLADDER SUSPENSION  2016    BREAST REDUCTION SURGERY      BREAST SURGERY Bilateral 02/2013    breast reduction    CERVICAL CERCLAGE N/A 11/8/2019    SUTURE REPAIR CERVICAL CUP performed by Berhane Miranda MD at [facility]  2007    COLONOSCOPY  7/25/14    normal    CYSTOSCOPY  7/13/15    Robotic cystocele repair, JORGE, lynx sling, sacrocolpopexy    ENDOMETRIAL ABLATION  2009 ? with bladder sling TVT    ENDOSCOPY, COLON, DIAGNOSTIC  09/15/2017    EGD    HERNIA REPAIR  1818    umbilical    HYSTERECTOMY (CERVIX STATUS UNKNOWN)  12/10/2013    VAGINAL, OVARIES ALSO REMOVED    HYSTERECTOMY, VAGINAL  12/10/2013    RAVH and posterior repair    OVARY REMOVAL      [place] EGD TRANSORAL BIOPSY SINGLE/MULTIPLE N/A 9/15/2017    EGD BIOPSY performed by Shabbir Francis DO at [facility]  07/14/2020    PARTIAL VAGINECTOMY     VAGINA SURGERY N/A 7/14/2020    PARTIAL VAGINECTOMY WITH ULTRASONIC SCAPEL ([address] W/ LONG HAND PIECE) performed by Brendon Reeves MD at [facility] History   Problem Relation Age of Onset    High Cholesterol Mother     High Blood Pressure Father     Heart Disease Father         stent    Cancer Father         bladder    Breast Cancer Maternal Aunt 79    Heart Disease Maternal Grandfather     Arthritis Maternal Grandfather     Cancer Maternal Grandfather         bone CA    Other Paternal Grandmother         Leukemia    Other Paternal Grandfather         Leukemia    Heart Disease Paternal Grandfather     No Known Problems Sister     No Known Problems Brother         Review of Systems   Constitutional: Negative for fever, chills, sweats.    Eyes: Negative for changes in vision, or pain. HENT: Negative for ear ache, epistaxis, or sore throat. Respiratory/Cardio: Negative for Chest pain, palpitations, SOB, or cough. Gastrointestinal: Negative for abdominal pain, N/V/D. Genitourinary: Negative for dysuria, frequency, urgency, or hematuria. Neurological: Negative for headache, numbness, or weakness. Integumentary: Negative for rash, itching, laceration, or abrasion. Musculoskeletal: Positive for Knee Pain (left)       Physical Exam:  Constitutional: Patient is oriented to person, place, and time. Patient appears well-developed and well nourished. HENT: Negative otherwise noted  Head: Normocephalic and Atraumatic  Nose: Normal  Eyes: Conjunctivae and EOM are normal  Neck: Normal range of motion Neck supple. Respiratory/Cardio: Effort normal. No respiratory distress. Musculoskeletal:    Left Knee:     Skin: warm and dry, no rash or erythema  Vasculature: 2+ pedal pulses bilaterally  Neuro: Sensation grossly intact to light touch diffusely  Alignment: Normal  Tenderness: Mild tenderness to the medial border the patella. No tenderness to either joint line. No tenderness to quad/patellar tendon, pes anserine bursa or posterior knee.   Effusion: Trace    ROM: (Degrees)       A P       Extension  0 0       Flexion   115 120       Crepitation  Yes       Muscle strength:         Flexion   5      Extension  5      SLR   5        Extensor lag   y          Special testing:  y    Pain with deep knee flexion     n    Patellar grind       y    Patellar apprehension      n    Patellar glide         n    Lachman       n    Anterior drawer      n    Pivot shift       n    Posterior drawer      n    Dial test       n    Posterolateral drawer      n    Posterior Sag       n    MCL        n    LCL          n    Medial joint line tenderness     n    Lateral joint line tenderness     n    McMurrey's         Neurological: Patient is alert and oriented to person, place, and time. Normal strenght. No sensory deficit. Skin: Skin is warm and dry  Psychiatric: Behavior is normal. Thought content normal.  Nursing note and vitals reviewed. Labs and Imaging:       X-rays taken in clinic today and preliminarily reviewed by me 9/1/22:  AP bilateral knees standing lateral view of the left knee demonstrates left knee with mild to moderate tricompartmental degenerative changes. Tricompartmental osteophyte formation. Without evidence of acute fracture or other acute osseous abnormality. Orders Placed This Encounter   Procedures    XR KNEE LEFT (1-2 VIEWS)     Standing Status:   Future     Number of Occurrences:   1     Standing Expiration Date:   8/29/2023       Assessment and Plan:  1. Chronic pain of left knee    2. Patellofemoral arthritis          PLAN:  Dagoberto Lora is a 59 y.o. old female who presents to the clinic today for evaluation of left knee pain concerning for patellofemoral OA. Patient responded very well to Kenalog injection at an visit on June 2021 requesting repeat injection today. Considered in the differential are collateral/cruciate ligament sprain versus tear, meniscal injury, septic joint and fracture. There is no evidence of fracture or degenerative changes on radiographs today. No evidence of ligamentous laxity or meniscal pathology on exam.    We discussed treatment options available to her, including nonoperative and operative intervention. At this time I believe she will benefit from conservative management. Intra-articular Kenalog injection provided as outlined below patient tolerated injection well educated on postinjection care  I'll see her back my clinic on a as needed basis.   Procedure Note: Left knee Kenalog Injection   An informed verbal consent for the procedure was obtained and risks including, but not limited to: allergy to medications, injection, bleeding, stiffness of joint, recurrence of symptoms, loss of function, swelling, drainage, irrigation, need for surgery and pseudo-septic inflammation, were explained to the patient. Also, discussed was the potential for further injections, irrigation and debridement and surgery. Alternate means of treatment have also been discussed with the patient. Following an appropriate discussion with the patient regarding the risks and benefits of the procedure she consented to proceed. her left knee was prepped using betadine solution and alcohol swab. Using aseptic technique and through a anterolateral approach, her left knee was injected superficially with 4 cc mixture of 2 cc Lidocaine without epi and 2 cc of Marcaine and subsequently with 1 cc of 40 mg/mL Kenalog into the left knee. A band aid was applied to the injection site. she tolerated the injection with no immediate adverse reactions. Electronically signed by Diania Gowers, PA on 9/1/22 at 8:32 AM EDT        Please note that this chart was generated using voice recognition Dragon dictation software. Although every effort was made to ensure the accuracy of this automated transcription, some errors in transcription may have occurred.

## 2022-09-19 ENCOUNTER — OFFICE VISIT (OUTPATIENT)
Dept: FAMILY MEDICINE CLINIC | Age: 57
End: 2022-09-19
Payer: COMMERCIAL

## 2022-09-19 VITALS
OXYGEN SATURATION: 98 % | DIASTOLIC BLOOD PRESSURE: 76 MMHG | BODY MASS INDEX: 26.63 KG/M2 | WEIGHT: 165 LBS | SYSTOLIC BLOOD PRESSURE: 118 MMHG | HEART RATE: 64 BPM

## 2022-09-19 DIAGNOSIS — E03.9 HYPOTHYROIDISM, UNSPECIFIED TYPE: ICD-10-CM

## 2022-09-19 DIAGNOSIS — E78.00 PURE HYPERCHOLESTEROLEMIA: Primary | ICD-10-CM

## 2022-09-19 DIAGNOSIS — R73.9 HYPERGLYCEMIA: ICD-10-CM

## 2022-09-19 DIAGNOSIS — K21.9 GASTRO-ESOPHAGEAL REFLUX DISEASE WITHOUT ESOPHAGITIS: ICD-10-CM

## 2022-09-19 DIAGNOSIS — F41.9 ANXIETY: ICD-10-CM

## 2022-09-19 PROBLEM — N39.3 STRESS INCONTINENCE OF URINE: Status: RESOLVED | Noted: 2022-03-14 | Resolved: 2022-09-19

## 2022-09-19 PROBLEM — N35.919 URETHRAL STRICTURE: Status: RESOLVED | Noted: 2022-03-14 | Resolved: 2022-09-19

## 2022-09-19 PROBLEM — R35.0 FREQUENCY OF MICTURITION: Status: RESOLVED | Noted: 2022-03-14 | Resolved: 2022-09-19

## 2022-09-19 PROBLEM — N60.09 SOLITARY CYST OF BREAST: Status: RESOLVED | Noted: 2022-03-14 | Resolved: 2022-09-19

## 2022-09-19 PROBLEM — F10.959 ALCOHOL-INDUCED PSYCHOSIS (HCC): Status: RESOLVED | Noted: 2022-03-14 | Resolved: 2022-09-19

## 2022-09-19 PROBLEM — B37.9 CANDIDIASIS: Status: RESOLVED | Noted: 2022-03-14 | Resolved: 2022-09-19

## 2022-09-19 PROBLEM — F15.929 OTHER STIMULANT USE, UNSPECIFIED WITH INTOXICATION, UNSPECIFIED (HCC): Status: RESOLVED | Noted: 2022-03-14 | Resolved: 2022-09-19

## 2022-09-19 PROBLEM — R63.5 ABNORMAL WEIGHT GAIN: Status: RESOLVED | Noted: 2022-03-14 | Resolved: 2022-09-19

## 2022-09-19 PROBLEM — R31.29 MICROSCOPIC HEMATURIA: Status: RESOLVED | Noted: 2022-03-14 | Resolved: 2022-09-19

## 2022-09-19 PROBLEM — N93.9 VAGINAL BLEEDING: Status: RESOLVED | Noted: 2022-03-14 | Resolved: 2022-09-19

## 2022-09-19 PROBLEM — R82.89: Status: RESOLVED | Noted: 2022-03-14 | Resolved: 2022-09-19

## 2022-09-19 PROBLEM — R10.2 PELVIC AND PERINEAL PAIN: Status: RESOLVED | Noted: 2022-03-14 | Resolved: 2022-09-19

## 2022-09-19 PROCEDURE — 99214 OFFICE O/P EST MOD 30 MIN: CPT | Performed by: FAMILY MEDICINE

## 2022-09-19 RX ORDER — FLUTICASONE PROPIONATE 50 MCG
SPRAY, SUSPENSION (ML) NASAL
Qty: 3 EACH | Refills: 3 | Status: SHIPPED | OUTPATIENT
Start: 2022-09-19

## 2022-09-19 ASSESSMENT — ENCOUNTER SYMPTOMS
CHEST TIGHTNESS: 0
CONSTIPATION: 0
ABDOMINAL PAIN: 0
RHINORRHEA: 0
SHORTNESS OF BREATH: 0
SORE THROAT: 0
ABDOMINAL DISTENTION: 0
COUGH: 0
VOMITING: 0
DIARRHEA: 0
NAUSEA: 0
BACK PAIN: 0

## 2022-09-19 ASSESSMENT — PATIENT HEALTH QUESTIONNAIRE - PHQ9
1. LITTLE INTEREST OR PLEASURE IN DOING THINGS: 0
2. FEELING DOWN, DEPRESSED OR HOPELESS: 0
SUM OF ALL RESPONSES TO PHQ QUESTIONS 1-9: 0
SUM OF ALL RESPONSES TO PHQ9 QUESTIONS 1 & 2: 0

## 2022-09-19 NOTE — PROGRESS NOTES
Tere Grier MD    [address] [place] [address]  [id number] [address], [address]  [address] [address]  Dept: [phone]  Dept Fax: [phone]     Patient ID: Royal Mendoza is a 59 y.o. female. Established patient here today for f/u on chronic medical problems, go over labs and/or diagnostic studies, and medication refills. Pt denies any fever or chills. Pt today denies any HA, chest pain, or SOB. Pt denies any N/V/D/C or abdominal pain. Pt with occasional heartburn, but stable on meds. Pt states mood is stable on meds. Pt has been watching her diet and exercising and is on the Qsymia and has lost almost 90#'s. She feels great. Otherwise pt doing well on current tx and no other concerns today. The patient's past medical, surgical, social, and family history as well as his current medications and allergies were reviewed as documented in today's encounter. My previous office notes, consult notes, labs and diagnostic studies were reviewed prior to and during encounter.     Current Outpatient Medications on File Prior to Visit   Medication Sig Dispense Refill    FLUoxetine (PROZAC) 20 MG capsule TAKE 1 CAPSULE BY MOUTH ONE TIME A DAY 90 capsule 3    omeprazole (PRILOSEC) 20 MG delayed release capsule TAKE 1 CAPSULE BY MOUTH ONE TIME A DAY 90 capsule 3    Phentermine-Topiramate (QSYMIA) 15-92 MG CP24 Take by mouth.      levothyroxine (SYNTHROID) 112 MCG tablet Take 1 tablet by mouth daily      Cholecalciferol (VITAMIN D) 125 MCG (5000 UT) CAPS Take 1 capsule by mouth daily      atorvastatin (LIPITOR) 20 MG tablet TAKE 1 TABLET BY MOUTH ONE TIME A DAY 90 tablet 3    vitamin C (ASCORBIC ACID) 500 MG tablet Take 500 mg by mouth daily      diphenhydrAMINE HCl (BENADRYL ALLERGY PO) Take 1 tablet by mouth as needed      aspirin 81 MG EC tablet Take 81 mg by mouth daily      zinc gluconate 50 MG tablet Take 50 mg by mouth daily (Patient not taking: Reported on 3/14/2022)       Current Facility-Administered Medications on File Prior to Visit   Medication Dose Route Frequency Provider Last Rate Last Admin    betamethasone acetate-betamethasone sodium phosphate (CELESTONE) injection 12 mg  12 mg [facility]ICUlar [facility]            Subjective:     Review of Systems   Constitutional:  Negative for appetite change, fatigue and fever. HENT:  Negative for congestion, ear pain, rhinorrhea and sore throat. Respiratory:  Negative for cough, chest tightness and shortness of breath. Cardiovascular:  Negative for chest pain and palpitations. Gastrointestinal:  Negative for abdominal distention, abdominal pain, constipation, diarrhea, nausea and vomiting. Occ heartburn   Genitourinary:  Negative for difficulty urinating and dysuria. Musculoskeletal:  Negative for arthralgias, back pain and myalgias. Skin:  Negative for rash. Neurological:  Negative for dizziness, weakness, light-headedness and headaches. Hematological:  Negative for adenopathy. Psychiatric/Behavioral:  Negative for behavioral problems and sleep disturbance. The patient is nervous/anxious (stable). Objective:     Physical Exam  Vitals reviewed. Constitutional:       General: She is not in acute distress. Appearance: She is well-developed. HENT:      Head: Normocephalic and atraumatic. Right Ear: External ear normal.      Left Ear: External ear normal.      Nose: Nose normal.      Mouth/Throat:      Pharynx: No oropharyngeal exudate. Eyes:      Conjunctiva/sclera: Conjunctivae normal.      Pupils: Pupils are equal, round, and reactive to light. Cardiovascular:      Rate and Rhythm: Normal rate and regular rhythm. Heart sounds: Normal heart sounds. No murmur heard. Pulmonary:      Effort: Pulmonary effort is normal. No respiratory distress. Breath sounds: Normal breath sounds. No wheezing. Chest:      Chest wall: No tenderness.    Abdominal: follow with Dr. Antonio Nagy as instructed and he does have her on the Phentermine-Topiramate for weight loss    I did congratulate her on her weight loss and to cont with diet and exercise    Get labs now and will call with results     Rest of systems unchanged, continue current treatments. Medications, labs, diagnostic studies, consultations and follow-up as documented in this encounter. Rest of systems unchanged, continue current treatments    On this date September 19, 2022,  I have spent greater than 50% of visit reviewing previous notes, test results and face to face with the patient discussing the diagnoses, importance of compliance with the treatment plan, counseling, coordinating care as well as documenting on the day of the visit. Tree Irwin MD

## 2022-09-26 ENCOUNTER — HOSPITAL ENCOUNTER (OUTPATIENT)
Age: 57
Discharge: HOME | End: 2022-09-26
Payer: COMMERCIAL

## 2022-09-26 DIAGNOSIS — R73.9 HYPERGLYCEMIA: ICD-10-CM

## 2022-09-26 DIAGNOSIS — E78.00 PURE HYPERCHOLESTEROLEMIA: ICD-10-CM

## 2022-09-26 LAB
ALBUMIN SERPL-MCNC: 4.3 G/DL (ref 3.5–5.2)
ALP BLD-CCNC: 56 U/L (ref 35–104)
ALT SERPL-CCNC: 31 U/L (ref 5–33)
ANION GAP SERPL CALCULATED.3IONS-SCNC: 9 MMOL/L (ref 9–17)
AST SERPL-CCNC: 22 U/L
BILIRUB SERPL-MCNC: 0.4 MG/DL (ref 0.3–1.2)
BUN BLDV-MCNC: 14 MG/DL (ref 6–20)
CALCIUM SERPL-MCNC: 9.9 MG/DL (ref 8.6–10.4)
CHLORIDE BLD-SCNC: 107 MMOL/L (ref 98–107)
CHOLESTEROL/HDL RATIO: 3.6
CHOLESTEROL: 165 MG/DL
CO2: 25 MMOL/L (ref 20–31)
CREAT SERPL-MCNC: 0.8 MG/DL (ref 0.5–0.9)
GFR AFRICAN AMERICAN: >60 ML/MIN
GFR NON-AFRICAN AMERICAN: >60 ML/MIN
GFR SERPL CREATININE-BSD FRML MDRD: ABNORMAL ML/MIN/{1.73_M2}
GLUCOSE BLD-MCNC: 113 MG/DL (ref 70–99)
HCT VFR BLD CALC: 41.8 % (ref 36–46)
HDLC SERPL-MCNC: 46 MG/DL
HEMOGLOBIN: 14 G/DL (ref 12–16)
LDL CHOLESTEROL: 100 MG/DL (ref 0–130)
MCH RBC QN AUTO: 29.4 PG (ref 26–34)
MCHC RBC AUTO-ENTMCNC: 33.4 G/DL (ref 31–37)
MCV RBC AUTO: 87.9 FL (ref 80–100)
PDW BLD-RTO: 13.9 % (ref 11.5–14.9)
PLATELET # BLD: 266 K/UL (ref 150–450)
PMV BLD AUTO: 7.2 FL (ref 6–12)
POTASSIUM SERPL-SCNC: 4.2 MMOL/L (ref 3.7–5.3)
RBC # BLD: 4.76 M/UL (ref 4–5.2)
SODIUM BLD-SCNC: 141 MMOL/L (ref 135–144)
TOTAL PROTEIN: 7.1 G/DL (ref 6.4–8.3)
TRIGL SERPL-MCNC: 93 MG/DL
WBC # BLD: 5.6 K/UL (ref 3.5–11)

## 2022-09-26 PROCEDURE — 80053 COMPREHEN METABOLIC PANEL: CPT

## 2022-09-26 PROCEDURE — 83036 HEMOGLOBIN GLYCOSYLATED A1C: CPT

## 2022-09-26 PROCEDURE — 36415 COLL VENOUS BLD VENIPUNCTURE: CPT

## 2022-09-26 PROCEDURE — 85027 COMPLETE CBC AUTOMATED: CPT

## 2022-09-26 PROCEDURE — 80061 LIPID PANEL: CPT

## 2022-09-27 LAB
ESTIMATED AVERAGE GLUCOSE: 108 MG/DL
HBA1C MFR BLD: 5.4 % (ref 4–6)

## 2022-10-20 ENCOUNTER — PATIENT MESSAGE (OUTPATIENT)
Dept: FAMILY MEDICINE CLINIC | Age: 57
End: 2022-10-20

## 2022-10-20 RX ORDER — VALACYCLOVIR HYDROCHLORIDE 1 G/1
2000 TABLET, FILM COATED ORAL 2 TIMES DAILY
Qty: 12 TABLET | Refills: 1 | Status: SHIPPED | OUTPATIENT
Start: 2022-10-20 | End: 2022-10-21

## 2022-10-20 NOTE — TELEPHONE ENCOUNTER
From: Kirk Santo  To: Dr. Mark Thapa: 10/20/2022 5:48 AM EDT  Subject: Valtrex     Good morning! I keep having outbreaks of cold sores on and around my mouth. Anupam had them since I was 8 yrs old , but they havent been bad until recently. I did read that the Covid vaccine may trigger the herpes virus. At any rate, I have previously had a refillable prescription to use as needed for these irritating sores! Is there any chance we can do that again? Im not sure if you have prescribed that way for me or if it was my previous physician. I just know the sooner I take the medication, the better. If this is possible, [facility] in [id number] is my pharmacy! Thank you!

## 2022-11-07 RX ORDER — ATORVASTATIN CALCIUM 20 MG/1
TABLET, FILM COATED ORAL
Qty: 90 TABLET | Refills: 3 | Status: SHIPPED | OUTPATIENT
Start: 2022-11-07

## 2023-01-11 ENCOUNTER — HOSPITAL ENCOUNTER (OUTPATIENT)
Age: 58
Discharge: HOME | End: 2023-01-11
Payer: COMMERCIAL

## 2023-01-11 LAB
THYROXINE, FREE: 1.17 NG/DL (ref 0.93–1.7)
TSH SERPL DL<=0.05 MIU/L-ACNC: 1.58 UIU/ML (ref 0.3–5)
VITAMIN D 25-HYDROXY: 49.7 NG/ML

## 2023-01-11 PROCEDURE — 82306 VITAMIN D 25 HYDROXY: CPT

## 2023-01-11 PROCEDURE — 84439 ASSAY OF FREE THYROXINE: CPT

## 2023-01-11 PROCEDURE — 36415 COLL VENOUS BLD VENIPUNCTURE: CPT

## 2023-01-11 PROCEDURE — 84443 ASSAY THYROID STIM HORMONE: CPT

## 2023-05-01 ENCOUNTER — OFFICE VISIT (OUTPATIENT)
Dept: FAMILY MEDICINE CLINIC | Age: 58
End: 2023-05-01
Payer: COMMERCIAL

## 2023-05-01 VITALS
RESPIRATION RATE: 16 BRPM | WEIGHT: 168 LBS | BODY MASS INDEX: 27.12 KG/M2 | OXYGEN SATURATION: 98 % | DIASTOLIC BLOOD PRESSURE: 80 MMHG | TEMPERATURE: 99 F | SYSTOLIC BLOOD PRESSURE: 122 MMHG | HEART RATE: 68 BPM

## 2023-05-01 DIAGNOSIS — E03.9 HYPOTHYROIDISM, UNSPECIFIED TYPE: ICD-10-CM

## 2023-05-01 DIAGNOSIS — K21.9 GASTRO-ESOPHAGEAL REFLUX DISEASE WITHOUT ESOPHAGITIS: ICD-10-CM

## 2023-05-01 DIAGNOSIS — F41.9 ANXIETY: ICD-10-CM

## 2023-05-01 DIAGNOSIS — R73.9 HYPERGLYCEMIA: ICD-10-CM

## 2023-05-01 DIAGNOSIS — E78.00 PURE HYPERCHOLESTEROLEMIA: Primary | ICD-10-CM

## 2023-05-01 DIAGNOSIS — Z12.31 ENCOUNTER FOR SCREENING MAMMOGRAM FOR HIGH-RISK PATIENT: ICD-10-CM

## 2023-05-01 PROCEDURE — 99214 OFFICE O/P EST MOD 30 MIN: CPT | Performed by: FAMILY MEDICINE

## 2023-05-01 RX ORDER — FLUOXETINE 10 MG/1
10 CAPSULE ORAL DAILY
Qty: 30 CAPSULE | Refills: 11 | Status: SHIPPED | OUTPATIENT
Start: 2023-05-01

## 2023-05-01 ASSESSMENT — ENCOUNTER SYMPTOMS
NAUSEA: 0
CHEST TIGHTNESS: 0
BACK PAIN: 0
DIARRHEA: 0
CONSTIPATION: 0
SHORTNESS OF BREATH: 0
ABDOMINAL DISTENTION: 0
ABDOMINAL PAIN: 0
SORE THROAT: 0
VOMITING: 0
COUGH: 0
RHINORRHEA: 0

## 2023-05-01 ASSESSMENT — PATIENT HEALTH QUESTIONNAIRE - PHQ9
SUM OF ALL RESPONSES TO PHQ9 QUESTIONS 1 & 2: 0
SUM OF ALL RESPONSES TO PHQ QUESTIONS 1-9: 0
2. FEELING DOWN, DEPRESSED OR HOPELESS: 0
1. LITTLE INTEREST OR PLEASURE IN DOING THINGS: 0
SUM OF ALL RESPONSES TO PHQ QUESTIONS 1-9: 0

## 2023-05-01 NOTE — PROGRESS NOTES
Tere Hopson MD    [address] [place] [address]  [id number]  [address] [address]  Dept: [phone]  Dept Fax: [phone]     Patient ID: Anne Hamlin is a 59 y.o. female. Established patient here today for f/u on chronic medical problems, go over labs and/or diagnostic studies, and medication refills. Pt denies any fever or chills. Pt today denies any HA, chest pain, or SOB. Pt denies any N/V/D/C or abdominal pain. Pt with occasional heartburn, but stable on meds. Pt states mood is stable on meds and is wondering if she can decrease the dose of the Prozac because she has been doing so well. Pt has been watching her diet and exercising and is on the Qsymia and has kept the weight off. She feels great. Otherwise pt doing well on current tx and no other concerns today. The patient's past medical, surgical, social, and family history as well as his current medications and allergies were reviewed as documented in today's encounter. My previous office notes, consult notes, labs and diagnostic studies were reviewed prior to and during encounter.     Current Outpatient Medications on File Prior to Visit   Medication Sig Dispense Refill    atorvastatin (LIPITOR) 20 MG tablet TAKE 1 TABLET BY MOUTH ONE TIME A DAY 90 tablet 3    fluticasone (FLONASE) 50 MCG/ACT nasal spray USE 1 SPRAY IN EACH NOSTRIL DAILY 3 each 3    omeprazole (PRILOSEC) 20 MG delayed release capsule TAKE 1 CAPSULE BY MOUTH ONE TIME A DAY 90 capsule 3    Phentermine-Topiramate (QSYMIA) 15-92 MG CP24 Take by mouth.      levothyroxine (SYNTHROID) 112 MCG tablet Take 1 tablet by mouth daily      Cholecalciferol (VITAMIN D) 125 MCG (5000 UT) CAPS Take 1 capsule by mouth daily      vitamin C (ASCORBIC ACID) 500 MG tablet Take 1 tablet by mouth daily      diphenhydrAMINE HCl (BENADRYL ALLERGY PO) Take 1 tablet by mouth as needed       Current Facility-Administered

## 2023-05-16 ENCOUNTER — OFFICE VISIT (OUTPATIENT)
Dept: FAMILY MEDICINE CLINIC | Age: 58
End: 2023-05-16
Payer: COMMERCIAL

## 2023-05-16 VITALS
TEMPERATURE: 97.3 F | SYSTOLIC BLOOD PRESSURE: 116 MMHG | DIASTOLIC BLOOD PRESSURE: 68 MMHG | OXYGEN SATURATION: 97 % | HEART RATE: 72 BPM

## 2023-05-16 DIAGNOSIS — J30.89 SEASONAL ALLERGIC RHINITIS DUE TO OTHER ALLERGIC TRIGGER: Primary | ICD-10-CM

## 2023-05-16 DIAGNOSIS — J02.9 SORE THROAT: ICD-10-CM

## 2023-05-16 LAB — S PYO AG THROAT QL: NORMAL

## 2023-05-16 PROCEDURE — 87880 STREP A ASSAY W/OPTIC: CPT

## 2023-05-16 PROCEDURE — 99213 OFFICE O/P EST LOW 20 MIN: CPT

## 2023-05-16 ASSESSMENT — ENCOUNTER SYMPTOMS
NAUSEA: 0
COUGH: 0
EYE ITCHING: 0
CHANGE IN BOWEL HABIT: 0
SWOLLEN GLANDS: 0
ABDOMINAL PAIN: 0
VOMITING: 0
SORE THROAT: 1
DIARRHEA: 0
EYE PAIN: 0
RHINORRHEA: 0

## 2023-05-16 NOTE — PROGRESS NOTES
[facility]  [address] [address]  Dept: [phone]  Dept Fax: [phone]    Ashtyn Holder is a 59 y.o. female who presents to the urgent care today for her medical conditions/complaints as notedbelow. Ashtyn Holder is c/o of Pharyngitis (Onset 2 days ) and Ear Fullness (/)      HPI:     Pharyngitis  This is a new problem. The current episode started yesterday. The problem occurs constantly. The problem has been gradually worsening. Associated symptoms include a sore throat. Pertinent negatives include no abdominal pain, change in bowel habit, chest pain, congestion, coughing, fatigue, fever, headaches, joint swelling, myalgias, nausea, rash, swollen glands, urinary symptoms or vomiting. Nothing aggravates the symptoms. Treatments tried: OTC Tx. The treatment provided mild relief. Ear Fullness   There is pain in both (L over right) ears. This is a new problem. The current episode started yesterday. The problem occurs constantly. The problem has been gradually worsening. There has been no fever. Associated symptoms include a sore throat. Pertinent negatives include no abdominal pain, coughing, diarrhea, headaches, hearing loss, rash, rhinorrhea or vomiting. She has tried acetaminophen for the symptoms. The treatment provided no relief. There is no history of a chronic ear infection, hearing loss or a tympanostomy tube. Past Medical History:   Diagnosis Date    Anxiety     Cystocele     Gastroesophageal reflux disease without esophagitis 10/24/2018    Hyperlipidemia     Hypothyroidism     Sleep apnea     USES CPAP MACHINE.     Thyroid disease     Urinary incontinence     Uterine fibroid     Vaginal prolapse     Vaginal spotting     Wears glasses     READING        Current Outpatient Medications   Medication Sig Dispense Refill    FLUoxetine (PROZAC) 10 MG capsule Take 1

## 2023-05-25 ENCOUNTER — HOSPITAL ENCOUNTER (OUTPATIENT)
Dept: WOMENS IMAGING | Age: 58
Discharge: HOME | End: 2023-05-27
Payer: COMMERCIAL

## 2023-05-25 DIAGNOSIS — Z12.31 ENCOUNTER FOR SCREENING MAMMOGRAM FOR HIGH-RISK PATIENT: ICD-10-CM

## 2023-05-25 PROCEDURE — 77063 BREAST TOMOSYNTHESIS BI: CPT

## 2023-05-26 ENCOUNTER — E-VISIT (OUTPATIENT)
Dept: FAMILY MEDICINE CLINIC | Age: 58
End: 2023-05-26
Payer: COMMERCIAL

## 2023-05-26 DIAGNOSIS — N30.00 ACUTE CYSTITIS WITHOUT HEMATURIA: Primary | ICD-10-CM

## 2023-05-26 PROCEDURE — 99422 OL DIG E/M SVC 11-20 MIN: CPT | Performed by: NURSE PRACTITIONER

## 2023-05-26 RX ORDER — NITROFURANTOIN 25; 75 MG/1; MG/1
100 CAPSULE ORAL 2 TIMES DAILY
Qty: 20 CAPSULE | Refills: 0 | Status: SHIPPED | OUTPATIENT
Start: 2023-05-26 | End: 2023-06-05

## 2023-05-26 NOTE — PROGRESS NOTES
HPI: as per patient provided history  Exam: N/A (electronic visit)  ASSESSMENT/PLAN:  Acute cystitis without hematuria  - start macrobid as prescribed. - Increase water/cranberry juice intake,   - Monitor self closely for fever, chills  - Notify the office if s/s have not resolved within 7 days  - Avoid baths, proper wiping discussed, white cotton panties encouraged, do not hold urine at any time'      Patient instructed to call the office if worsens, or fails to improve as anticipated. 11-20 minutes were spent on the digital evaluation and management of this patient.

## 2023-07-24 ENCOUNTER — HOSPITAL ENCOUNTER (OUTPATIENT)
Age: 58
Discharge: HOME | End: 2023-07-24
Payer: COMMERCIAL

## 2023-07-24 DIAGNOSIS — R73.9 HYPERGLYCEMIA: ICD-10-CM

## 2023-07-24 DIAGNOSIS — E78.00 PURE HYPERCHOLESTEROLEMIA: ICD-10-CM

## 2023-07-24 LAB
25(OH)D3 SERPL-MCNC: 60.1 NG/ML
ALBUMIN SERPL-MCNC: 4.3 G/DL (ref 3.5–5.2)
ALP SERPL-CCNC: 54 U/L (ref 35–104)
ALT SERPL-CCNC: 18 U/L (ref 5–33)
ANION GAP SERPL CALCULATED.3IONS-SCNC: 11 MMOL/L (ref 9–17)
AST SERPL-CCNC: 19 U/L
BILIRUB SERPL-MCNC: 0.2 MG/DL (ref 0.3–1.2)
BUN SERPL-MCNC: 13 MG/DL (ref 8–23)
CALCIUM SERPL-MCNC: 9.1 MG/DL (ref 8.6–10.4)
CHLORIDE SERPL-SCNC: 105 MMOL/L (ref 98–107)
CHOLEST SERPL-MCNC: 167 MG/DL
CHOLESTEROL/HDL RATIO: 3.8
CO2 SERPL-SCNC: 25 MMOL/L (ref 20–31)
CREAT SERPL-MCNC: 1 MG/DL (ref 0.5–0.9)
ERYTHROCYTE [DISTWIDTH] IN BLOOD BY AUTOMATED COUNT: 13.6 % (ref 11.5–14.9)
EST. AVERAGE GLUCOSE BLD GHB EST-MCNC: 103 MG/DL
GFR SERPL CREATININE-BSD FRML MDRD: >60 ML/MIN/1.73M2
GLUCOSE SERPL-MCNC: 97 MG/DL (ref 70–99)
HBA1C MFR BLD: 5.2 % (ref 4–6)
HCT VFR BLD AUTO: 38.5 % (ref 36–46)
HDLC SERPL-MCNC: 44 MG/DL
HGB BLD-MCNC: 13.1 G/DL (ref 12–16)
LDLC SERPL CALC-MCNC: 100 MG/DL (ref 0–130)
MCH RBC QN AUTO: 30.1 PG (ref 26–34)
MCHC RBC AUTO-ENTMCNC: 33.9 G/DL (ref 31–37)
MCV RBC AUTO: 88.9 FL (ref 80–100)
PLATELET # BLD AUTO: 260 K/UL (ref 150–450)
PMV BLD AUTO: 7.4 FL (ref 6–12)
POTASSIUM SERPL-SCNC: 3.8 MMOL/L (ref 3.7–5.3)
PROT SERPL-MCNC: 6.9 G/DL (ref 6.4–8.3)
RBC # BLD AUTO: 4.34 M/UL (ref 4–5.2)
SODIUM SERPL-SCNC: 141 MMOL/L (ref 135–144)
T4 FREE SERPL-MCNC: 1.5 NG/DL (ref 0.9–1.7)
TRIGL SERPL-MCNC: 117 MG/DL
TSH SERPL DL<=0.05 MIU/L-ACNC: 0.63 UIU/ML (ref 0.3–5)
WBC OTHER # BLD: 8.3 K/UL (ref 3.5–11)

## 2023-07-24 PROCEDURE — 36415 COLL VENOUS BLD VENIPUNCTURE: CPT

## 2023-07-24 PROCEDURE — 80061 LIPID PANEL: CPT

## 2023-07-24 PROCEDURE — 82306 VITAMIN D 25 HYDROXY: CPT

## 2023-07-24 PROCEDURE — 83036 HEMOGLOBIN GLYCOSYLATED A1C: CPT

## 2023-07-24 PROCEDURE — 85027 COMPLETE CBC AUTOMATED: CPT

## 2023-07-24 PROCEDURE — 84443 ASSAY THYROID STIM HORMONE: CPT

## 2023-07-24 PROCEDURE — 84439 ASSAY OF FREE THYROXINE: CPT

## 2023-07-24 PROCEDURE — 80053 COMPREHEN METABOLIC PANEL: CPT

## 2023-08-14 ENCOUNTER — OFFICE VISIT (OUTPATIENT)
Dept: OBGYN CLINIC | Age: 58
End: 2023-08-14
Payer: COMMERCIAL

## 2023-08-14 ENCOUNTER — HOSPITAL ENCOUNTER (OUTPATIENT)
Age: 58
Setting detail: SPECIMEN
Discharge: HOME | End: 2023-08-14

## 2023-08-14 VITALS
DIASTOLIC BLOOD PRESSURE: 72 MMHG | HEIGHT: 62 IN | SYSTOLIC BLOOD PRESSURE: 110 MMHG | WEIGHT: 167 LBS | BODY MASS INDEX: 30.73 KG/M2

## 2023-08-14 DIAGNOSIS — Z12.31 SCREENING MAMMOGRAM, ENCOUNTER FOR: ICD-10-CM

## 2023-08-14 DIAGNOSIS — Z98.890 HISTORY OF BILATERAL BREAST REDUCTION SURGERY: ICD-10-CM

## 2023-08-14 DIAGNOSIS — Z80.3 FAMILY HISTORY OF BREAST CANCER: ICD-10-CM

## 2023-08-14 DIAGNOSIS — N89.8 VAGINAL DISCHARGE: ICD-10-CM

## 2023-08-14 DIAGNOSIS — Z01.419 WELL WOMAN EXAM WITH ROUTINE GYNECOLOGICAL EXAM: Primary | ICD-10-CM

## 2023-08-14 LAB
CANDIDA SPECIES: NEGATIVE
GARDNERELLA VAGINALIS: POSITIVE
SOURCE: ABNORMAL
TRICHOMONAS: NEGATIVE

## 2023-08-14 PROCEDURE — 99386 PREV VISIT NEW AGE 40-64: CPT | Performed by: OBSTETRICS & GYNECOLOGY

## 2023-08-14 NOTE — PATIENT INSTRUCTIONS
Please get your mammogram next year as scheduled. We will contact you with the results of today's culture and if any treatment is needed we will forward that to your pharmacy. We will also let you know the results of today's Pap smear. Plan on returning to the office in 1 year or as needed. Welcome back to the practice and have a fantastic year!

## 2023-08-14 NOTE — PROGRESS NOTES
Chaperone for Intimate Exam  Chaperone was offered chaperone and ERICKA Irby was present for procedure. Chaperone: ERICKA Irby RN

## 2023-08-14 NOTE — PROGRESS NOTES
[address] [facility] Renzo Knutson  [address] [address]      DATE OF VISIT:  23        History and Physical    Janay Eduardo    :  1965  CHIEF COMPLAINT:    Chief Complaint   Patient presents with    Vaginal Bleeding     Hx bldg from cx cuff, recently  was recently dx HPV                      HPI :   Janay Eduardo is a 59 y.o. femaleGRAVIDA 2 PARA  PCP: Ana Hughes MD    Janay Eduardo returns today for her annual exam.  Recent mammogram and be well within blood work WNL. She is a former patient of Dr. Matt Colvin who performed an RAVH/BSO secondary to symptomatic fibroids around . Initially she was on ERT, was discontinued and currently is on bioidentical's. She had persistent postcoital bleeding, was seen by Dr. Jovani Dominguez who referred her to Dr. Devaughn Pickard. She had a previous urethral sling by Dr. Abner Lopez and Dr. Devaughn Pickard performed a vaginectomy. Arleth Abraham is currently not sexually active but she states that from time to time she does get a brownish and sometimes pink discharge. She did a recent commercial lab test for HPV and states the results came back negative for 16/18 and positive for other high risk types? She is having regular bowel movements without constipation or diarrhea. She denies any UTI symptoms or involuntary loss of urine. She is otherwise without any significant complaints today. 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  _____________________________________________________________________  Past Medical History:   Diagnosis Date    Anxiety     Cystocele     Gastroesophageal reflux disease without esophagitis 10/24/2018    Hyperlipidemia     Hypothyroidism     Sleep apnea     USES CPAP MACHINE.     Thyroid disease     Urinary incontinence     Uterine fibroid     Vaginal prolapse     Vaginal spotting     Wears glasses     READING

## 2023-08-15 RX ORDER — METRONIDAZOLE 500 MG/1
500 TABLET ORAL 2 TIMES DAILY
Qty: 14 TABLET | Refills: 0 | Status: SHIPPED | OUTPATIENT
Start: 2023-08-15 | End: 2023-08-22

## 2023-08-16 LAB
HPV I/H RISK 4 DNA CVX QL NAA+PROBE: DETECTED
HPV SAMPLE: ABNORMAL
HPV, INTERPRETATION: ABNORMAL
HPV16 DNA CVX QL NAA+PROBE: NOT DETECTED
HPV18 DNA CVX QL NAA+PROBE: NOT DETECTED
SPECIMEN DESCRIPTION: ABNORMAL

## 2023-08-22 LAB — CYTOLOGY REPORT: NORMAL

## 2023-08-31 RX ORDER — CLINDAMYCIN PHOSPHATE 20 MG/G
CREAM VAGINAL
Qty: 40 G | Refills: 0 | Status: SHIPPED | OUTPATIENT
Start: 2023-08-31

## 2023-09-28 ENCOUNTER — PROCEDURE VISIT (OUTPATIENT)
Dept: OBGYN CLINIC | Age: 58
End: 2023-09-28

## 2023-09-28 VITALS
HEIGHT: 62 IN | DIASTOLIC BLOOD PRESSURE: 72 MMHG | BODY MASS INDEX: 29.63 KG/M2 | WEIGHT: 161 LBS | SYSTOLIC BLOOD PRESSURE: 110 MMHG

## 2023-09-28 DIAGNOSIS — R87.622 LOW GRADE SQUAMOUS INTRAEPITHELIAL LESION ON CYTOLOGIC SMEAR OF VAGINA (LGSIL): Primary | ICD-10-CM

## 2023-09-28 NOTE — PROGRESS NOTES
[facility] Roxanne Clarke  [address]     HPI  Estefania Chapin returns today for follow-up of an abnormal pap smear done at her annual exam 8/14/2023. Prior to that she did order a commercial HPV testing kit that showed she was positive for other high risk HPV types but negative for 16/18. She has had a hysterectomy and a subsequent partial vaginectomy. The pap smear results were :  Path Number: [id number]     DIAGNOSIS     Imaged ThinPrep Pap - Vaginal (1 monolayer slide):   Specimen Adequacy:        Satisfactory for evaluation. Descriptive Diagnosis:        Low-grade squamous intraepithelial lesion (LSIL). Cytotech Screener:  [id number]       **Electronically Signed Out**         Sneha Soliman M.D.   kristofer/8/22/2023     Procedure/Addendum   HPV Procedure Report        Date Ordered:     8/15/2023     Status: Signed Out        Date Complete:     8/16/2023     By: System Interface        Date Reported:     8/16/2023              Sample:  HPV Type 16      Result:   Not Detected      Ref Range:   (Not Detected)   Sample:  HPV Type 18      Result:   Not Detected      Ref Range: (Not   Detected)   Sample: Other High Risk HPV      Result:   DETECTED      Ref Range:   (Not Detected)   Sample:  HPV Interp      Result:         Ref Range: (Not Detected)   This test amplifies and detects DNA of 14 high-risk HPV types   associated with cervical cancer and its precursor lesions    (HPV types 16,18, 31, 33, 35, 39, 45, 51, 52, 56, 58, 59, 66, and   68). She is otherwise doing well and presents today with no significant complaints or concerns. Her Pap smear results were discussed and current Pap smear management guidelines were discussed. The procedure was explained, all her questions answered and she agrees to proceed. Review of Systems   Gastrointestinal: Negative. Negative for abdominal pain.

## 2023-09-28 NOTE — PATIENT INSTRUCTIONS
Plan on returning to the office in August for your annual exam or as needed. Stay safe and healthy and have a wonderful year!

## 2023-10-04 RX ORDER — OMEPRAZOLE 20 MG/1
CAPSULE, DELAYED RELEASE ORAL
Qty: 90 CAPSULE | Refills: 3 | Status: SHIPPED | OUTPATIENT
Start: 2023-10-04

## 2023-11-09 ENCOUNTER — OFFICE VISIT (OUTPATIENT)
Dept: FAMILY MEDICINE CLINIC | Age: 58
End: 2023-11-09
Payer: COMMERCIAL

## 2023-11-09 VITALS
BODY MASS INDEX: 28.32 KG/M2 | OXYGEN SATURATION: 98 % | WEIGHT: 170 LBS | HEIGHT: 65 IN | HEART RATE: 76 BPM | TEMPERATURE: 99.2 F | SYSTOLIC BLOOD PRESSURE: 104 MMHG | RESPIRATION RATE: 16 BRPM | DIASTOLIC BLOOD PRESSURE: 76 MMHG

## 2023-11-09 DIAGNOSIS — K21.9 GASTRO-ESOPHAGEAL REFLUX DISEASE WITHOUT ESOPHAGITIS: ICD-10-CM

## 2023-11-09 DIAGNOSIS — F41.9 ANXIETY: ICD-10-CM

## 2023-11-09 DIAGNOSIS — E03.9 HYPOTHYROIDISM, UNSPECIFIED TYPE: ICD-10-CM

## 2023-11-09 DIAGNOSIS — R73.9 HYPERGLYCEMIA: ICD-10-CM

## 2023-11-09 DIAGNOSIS — E78.00 PURE HYPERCHOLESTEROLEMIA: Primary | ICD-10-CM

## 2023-11-09 PROCEDURE — 99214 OFFICE O/P EST MOD 30 MIN: CPT | Performed by: FAMILY MEDICINE

## 2023-11-09 ASSESSMENT — ENCOUNTER SYMPTOMS
ABDOMINAL DISTENTION: 0
SHORTNESS OF BREATH: 0
DIARRHEA: 0
CONSTIPATION: 0
NAUSEA: 0
VOMITING: 0
SORE THROAT: 0
ABDOMINAL PAIN: 0
CHEST TIGHTNESS: 0
COUGH: 0
BACK PAIN: 0
RHINORRHEA: 0

## 2023-11-09 ASSESSMENT — PATIENT HEALTH QUESTIONNAIRE - PHQ9
1. LITTLE INTEREST OR PLEASURE IN DOING THINGS: 0
SUM OF ALL RESPONSES TO PHQ QUESTIONS 1-9: 0
SUM OF ALL RESPONSES TO PHQ9 QUESTIONS 1 & 2: 0
SUM OF ALL RESPONSES TO PHQ QUESTIONS 1-9: 0
2. FEELING DOWN, DEPRESSED OR HOPELESS: 0
SUM OF ALL RESPONSES TO PHQ QUESTIONS 1-9: 0
SUM OF ALL RESPONSES TO PHQ QUESTIONS 1-9: 0

## 2023-11-09 NOTE — PROGRESS NOTES
Tere Murray MD    [address] [place] [address]  [address] [address], [address] [place] [address]  Dept: [phone]  Dept Fax: [phone]     Patient ID: Paul Rod is a 59 y.o. female. Established patient here today for f/u on chronic medical problems, go over labs and/or diagnostic studies, and medication refills. Pt denies any fever or chills. Pt today denies any HA, chest pain, or SOB. Pt denies any N/V/D/C or abdominal pain. Pt with occasional heartburn, but stable on meds. Pt states mood is stable on meds. Pt has been watching her diet and exercising and is on the Qsymia and has kept the weight off. She feels great. Otherwise pt doing well on current tx and no other concerns today. The patient's past medical, surgical, social, and family history as well as his current medications and allergies were reviewed as documented in today's encounter. My previous office notes, consult notes, labs and diagnostic studies were reviewed prior to and during encounter. Current Outpatient Medications on File Prior to Visit   Medication Sig Dispense Refill    omeprazole (PRILOSEC) 20 MG delayed release capsule TAKE 1 CAPSULE BY MOUTH ONE TIME A DAY 90 capsule 3    FLUoxetine (PROZAC) 10 MG capsule Take 1 capsule by mouth daily 30 capsule 11    atorvastatin (LIPITOR) 20 MG tablet TAKE 1 TABLET BY MOUTH ONE TIME A DAY 90 tablet 3    fluticasone (FLONASE) 50 MCG/ACT nasal spray USE 1 SPRAY IN EACH NOSTRIL DAILY 3 each 3    Phentermine-Topiramate (QSYMIA) 15-92 MG CP24 Take by mouth.      levothyroxine (SYNTHROID) 112 MCG tablet Take 1 tablet by mouth daily      Cholecalciferol (VITAMIN D) 125 MCG (5000 UT) CAPS Take 1 capsule by mouth daily      vitamin C (ASCORBIC ACID) 500 MG tablet Take 1 tablet by mouth daily      clindamycin (CLEOCIN) 2 % vaginal cream Place vaginally nightly for 5 nights.  40 g 0     Current Facility-Administered Medications

## 2024-01-18 RX ORDER — ATORVASTATIN CALCIUM 20 MG/1
20 TABLET, FILM COATED ORAL DAILY
Qty: 90 TABLET | Refills: 3 | Status: SHIPPED | OUTPATIENT
Start: 2024-01-18

## 2024-02-07 ENCOUNTER — HOSPITAL ENCOUNTER (OUTPATIENT)
Age: 59
Discharge: HOME | End: 2024-02-07
Payer: COMMERCIAL

## 2024-02-07 LAB
25(OH)D3 SERPL-MCNC: 49.9 NG/ML (ref 30–100)
T4 FREE SERPL-MCNC: 0.9 NG/DL (ref 0.9–1.7)
TSH SERPL DL<=0.05 MIU/L-ACNC: 10.11 UIU/ML (ref 0.3–5)

## 2024-02-07 PROCEDURE — 82306 VITAMIN D 25 HYDROXY: CPT

## 2024-02-07 PROCEDURE — 36415 COLL VENOUS BLD VENIPUNCTURE: CPT

## 2024-02-07 PROCEDURE — 84443 ASSAY THYROID STIM HORMONE: CPT

## 2024-02-07 PROCEDURE — 84439 ASSAY OF FREE THYROXINE: CPT

## 2024-03-26 ENCOUNTER — HOSPITAL ENCOUNTER (EMERGENCY)
Age: 59
Discharge: HOME | End: 2024-03-26
Attending: EMERGENCY MEDICINE
Payer: COMMERCIAL

## 2024-03-26 VITALS
OXYGEN SATURATION: 96 % | RESPIRATION RATE: 16 BRPM | SYSTOLIC BLOOD PRESSURE: 144 MMHG | TEMPERATURE: 97.4 F | DIASTOLIC BLOOD PRESSURE: 82 MMHG | WEIGHT: 168 LBS | BODY MASS INDEX: 27.99 KG/M2 | HEIGHT: 65 IN | HEART RATE: 79 BPM

## 2024-03-26 DIAGNOSIS — M54.50 ACUTE LEFT-SIDED LOW BACK PAIN WITHOUT SCIATICA: Primary | ICD-10-CM

## 2024-03-26 PROCEDURE — 99284 EMERGENCY DEPT VISIT MOD MDM: CPT

## 2024-03-26 PROCEDURE — 96372 THER/PROPH/DIAG INJ SC/IM: CPT

## 2024-03-26 PROCEDURE — 6360000002 HC RX W HCPCS: Performed by: EMERGENCY MEDICINE

## 2024-03-26 PROCEDURE — 6370000000 HC RX 637 (ALT 250 FOR IP): Performed by: EMERGENCY MEDICINE

## 2024-03-26 RX ORDER — ORPHENADRINE CITRATE 30 MG/ML
60 INJECTION INTRAMUSCULAR; INTRAVENOUS ONCE
Status: COMPLETED | OUTPATIENT
Start: 2024-03-26 | End: 2024-03-26

## 2024-03-26 RX ORDER — ORPHENADRINE CITRATE 100 MG/1
100 TABLET, EXTENDED RELEASE ORAL 2 TIMES DAILY PRN
Qty: 20 TABLET | Refills: 0 | Status: SHIPPED | OUTPATIENT
Start: 2024-03-26 | End: 2024-04-05

## 2024-03-26 RX ORDER — LIDOCAINE 50 MG/G
1 PATCH TOPICAL DAILY
Qty: 10 PATCH | Refills: 0 | Status: SHIPPED | OUTPATIENT
Start: 2024-03-26 | End: 2024-04-05

## 2024-03-26 RX ORDER — LIDOCAINE 4 G/G
1 PATCH TOPICAL ONCE
Status: DISCONTINUED | OUTPATIENT
Start: 2024-03-26 | End: 2024-03-26 | Stop reason: HOSPADM

## 2024-03-26 RX ORDER — IBUPROFEN 600 MG/1
600 TABLET ORAL 3 TIMES DAILY PRN
Qty: 20 TABLET | Refills: 0 | Status: SHIPPED | OUTPATIENT
Start: 2024-03-26

## 2024-03-26 RX ORDER — HYDROCODONE BITARTRATE AND ACETAMINOPHEN 5; 325 MG/1; MG/1
1 TABLET ORAL EVERY 6 HOURS PRN
Qty: 12 TABLET | Refills: 0 | Status: SHIPPED | OUTPATIENT
Start: 2024-03-26 | End: 2024-03-29

## 2024-03-26 RX ORDER — KETOROLAC TROMETHAMINE 30 MG/ML
30 INJECTION, SOLUTION INTRAMUSCULAR; INTRAVENOUS ONCE
Status: COMPLETED | OUTPATIENT
Start: 2024-03-26 | End: 2024-03-26

## 2024-03-26 RX ADMIN — KETOROLAC TROMETHAMINE 30 MG: 30 INJECTION, SOLUTION INTRAMUSCULAR; INTRAVENOUS at 08:06

## 2024-03-26 RX ADMIN — ORPHENADRINE CITRATE 60 MG: 60 INJECTION INTRAMUSCULAR; INTRAVENOUS at 08:06

## 2024-03-26 ASSESSMENT — LIFESTYLE VARIABLES
HOW OFTEN DO YOU HAVE A DRINK CONTAINING ALCOHOL: NEVER
HOW MANY STANDARD DRINKS CONTAINING ALCOHOL DO YOU HAVE ON A TYPICAL DAY: PATIENT DOES NOT DRINK

## 2024-03-26 ASSESSMENT — PAIN SCALES - GENERAL: PAINLEVEL_OUTOF10: 2

## 2024-03-26 ASSESSMENT — PAIN DESCRIPTION - PAIN TYPE: TYPE: ACUTE PAIN

## 2024-03-26 ASSESSMENT — PAIN DESCRIPTION - LOCATION: LOCATION: BACK

## 2024-03-26 ASSESSMENT — ENCOUNTER SYMPTOMS
SHORTNESS OF BREATH: 0
COLOR CHANGE: 0
ABDOMINAL PAIN: 0
BACK PAIN: 1
EYE PAIN: 0

## 2024-03-26 ASSESSMENT — PAIN - FUNCTIONAL ASSESSMENT: PAIN_FUNCTIONAL_ASSESSMENT: 0-10

## 2024-03-26 ASSESSMENT — PAIN DESCRIPTION - DESCRIPTORS: DESCRIPTORS: ACHING;STABBING

## 2024-03-26 ASSESSMENT — PAIN DESCRIPTION - ORIENTATION: ORIENTATION: LEFT

## 2024-03-26 NOTE — ED PROVIDER NOTES
EMERGENCY DEPARTMENT ENCOUNTER    Pt Name: Anni Santo  MRN: [id number]  Birthdate 1965  Date of evaluation: 3/26/24  CHIEF COMPLAINT       Chief Complaint   Patient presents with    Back Pain     Left lower      HISTORY OF PRESENT ILLNESS   59-year-old female presents for complaints of left-sided back and hip pain.  Patient reports that the pain started on Sunday after she lifted 2 bags of water softener salt.  She reports since that time been having worsening left-sided back and buttock and hip pain.  She denies any significant radiation of pain.  Reports pain is worse with movements, improves when she is staying still, states that she took a dose of ibuprofen without relief.  She denies any midline pain.  She denies any new numbness, tingling, weakness to the extremity.  She denies any bowel or bladder incontinence or saddle paresthesias.  Denies any history of malignancy, steroid use, IV drug use, or anticoagulation use.  Denies any associated chest pain, shortness of breath abdominal pain nausea or vomiting, fevers or chills.    The history is provided by the patient.           REVIEW OF SYSTEMS     Review of Systems   Constitutional:  Negative for fever.   HENT:  Negative for congestion and ear pain.    Eyes:  Negative for pain.   Respiratory:  Negative for shortness of breath.    Cardiovascular:  Negative for chest pain, palpitations and leg swelling.   Gastrointestinal:  Negative for abdominal pain.   Genitourinary:  Negative for dysuria and flank pain.   Musculoskeletal:  Positive for back pain.   Skin:  Negative for color change.   Neurological:  Negative for numbness and headaches.   Psychiatric/Behavioral:  Negative for confusion.    All other systems reviewed and are negative.    PASTMEDICAL HISTORY     Past Medical History:   Diagnosis Date    Anxiety     Cystocele     Gastroesophageal reflux disease without esophagitis 10/24/2018    Hyperlipidemia     Hypothyroidism     Sleep apnea

## 2024-03-26 NOTE — ED TRIAGE NOTES
Mode of arrival (squad #, walk in, police, etc) : walk in         Chief complaint(s): left lower back pain         Arrival Note (brief scenario, treatment PTA, etc).: pt states back pain started Sunday after lifting rock salt. Pt unable to get relief with heat, OTC tylenol and ibuprofen. Last dose of 0400 ibuprofen and 0600 tylenol per pt         C= \"Have you ever felt that you should Cut down on your drinking?\"  No  A= \"Have people Annoyed you by criticizing your drinking?\"  No  G= \"Have you ever felt bad or Guilty about your drinking?\"  No  E= \"Have you ever had a drink as an Eye-opener first thing in the morning to steady your nerves or to help a hangover?\"  No      Deferred []      Reason for deferring: N/A    *If yes to two or more: probable alcohol abuse.*

## 2024-04-29 ENCOUNTER — HOSPITAL ENCOUNTER (OUTPATIENT)
Age: 59
Discharge: HOME | End: 2024-04-29
Payer: COMMERCIAL

## 2024-04-29 LAB
T4 FREE SERPL-MCNC: 1.3 NG/DL (ref 0.9–1.7)
TSH SERPL DL<=0.05 MIU/L-ACNC: 0.38 UIU/ML (ref 0.3–5)

## 2024-04-29 PROCEDURE — 84443 ASSAY THYROID STIM HORMONE: CPT

## 2024-04-29 PROCEDURE — 36415 COLL VENOUS BLD VENIPUNCTURE: CPT

## 2024-04-29 PROCEDURE — 84439 ASSAY OF FREE THYROXINE: CPT

## 2024-05-06 DIAGNOSIS — F41.9 ANXIETY: ICD-10-CM

## 2024-05-06 RX ORDER — FLUOXETINE 10 MG/1
10 CAPSULE ORAL DAILY
Qty: 30 CAPSULE | Refills: 7 | Status: SHIPPED | OUTPATIENT
Start: 2024-05-06

## 2024-05-06 NOTE — TELEPHONE ENCOUNTER
Please Approve or Refuse.  Send to Pharmacy per Pt's Request:      Next Visit Date:  5/9/2024   Last Visit Date: 11/9/2023    Hemoglobin A1C (%)   Date Value   07/24/2023 5.2   09/26/2022 5.4   03/14/2022 5.8             ( goal A1C is < 7)   BP Readings from Last 3 Encounters:   03/26/24 (!) 144/82   11/09/23 104/76   09/28/23 110/72          (goal 120/80)  BUN   Date Value Ref Range Status   07/24/2023 13 8 - 23 mg/dL Final     Creatinine   Date Value Ref Range Status   07/24/2023 1.0 (H) 0.5 - 0.9 mg/dL Final     Potassium   Date Value Ref Range Status   07/24/2023 3.8 3.7 - 5.3 mmol/L Final

## 2024-06-11 ENCOUNTER — HOSPITAL ENCOUNTER (OUTPATIENT)
Age: 59
Discharge: HOME | End: 2024-06-11
Payer: COMMERCIAL

## 2024-06-11 DIAGNOSIS — E78.00 PURE HYPERCHOLESTEROLEMIA: ICD-10-CM

## 2024-06-11 DIAGNOSIS — E03.9 HYPOTHYROIDISM, UNSPECIFIED TYPE: ICD-10-CM

## 2024-06-11 DIAGNOSIS — R73.9 HYPERGLYCEMIA: ICD-10-CM

## 2024-06-11 LAB
ALBUMIN SERPL-MCNC: 4.3 G/DL (ref 3.5–5.2)
ALP SERPL-CCNC: 57 U/L (ref 35–104)
ALT SERPL-CCNC: 22 U/L (ref 5–33)
ANION GAP SERPL CALCULATED.3IONS-SCNC: 10 MMOL/L (ref 9–17)
AST SERPL-CCNC: 19 U/L
BILIRUB SERPL-MCNC: 0.3 MG/DL (ref 0.3–1.2)
BUN SERPL-MCNC: 9 MG/DL (ref 8–23)
CALCIUM SERPL-MCNC: 9.3 MG/DL (ref 8.6–10.4)
CHLORIDE SERPL-SCNC: 107 MMOL/L (ref 98–107)
CHOLEST SERPL-MCNC: 164 MG/DL
CHOLESTEROL/HDL RATIO: 3.6
CO2 SERPL-SCNC: 24 MMOL/L (ref 20–31)
CREAT SERPL-MCNC: 0.9 MG/DL (ref 0.5–0.9)
EST. AVERAGE GLUCOSE BLD GHB EST-MCNC: 105 MG/DL
GFR, ESTIMATED: 73 ML/MIN/1.73M2
GLUCOSE SERPL-MCNC: 94 MG/DL (ref 70–99)
HBA1C MFR BLD: 5.3 % (ref 4–6)
HCT VFR BLD AUTO: 39.7 % (ref 36–46)
HDLC SERPL-MCNC: 46 MG/DL
HGB BLD-MCNC: 13 G/DL (ref 12–16)
LDLC SERPL CALC-MCNC: 96 MG/DL (ref 0–130)
MCH RBC QN AUTO: 30.1 PG (ref 26–34)
MCHC RBC AUTO-ENTMCNC: 32.8 G/DL (ref 31–37)
MCV RBC AUTO: 91.9 FL (ref 80–100)
PLATELET # BLD AUTO: 289 K/UL (ref 150–450)
PMV BLD AUTO: 7.3 FL (ref 6–12)
POTASSIUM SERPL-SCNC: 4.3 MMOL/L (ref 3.7–5.3)
PROT SERPL-MCNC: 6.7 G/DL (ref 6.4–8.3)
RBC # BLD AUTO: 4.32 M/UL (ref 4–5.2)
SODIUM SERPL-SCNC: 141 MMOL/L (ref 135–144)
T4 FREE SERPL-MCNC: 1.3 NG/DL (ref 0.9–1.7)
TRIGL SERPL-MCNC: 112 MG/DL
TSH SERPL DL<=0.05 MIU/L-ACNC: 1.39 UIU/ML (ref 0.3–5)
WBC OTHER # BLD: 8.5 K/UL (ref 3.5–11)

## 2024-06-11 PROCEDURE — 83036 HEMOGLOBIN GLYCOSYLATED A1C: CPT

## 2024-06-11 PROCEDURE — 80053 COMPREHEN METABOLIC PANEL: CPT

## 2024-06-11 PROCEDURE — 84443 ASSAY THYROID STIM HORMONE: CPT

## 2024-06-11 PROCEDURE — 36415 COLL VENOUS BLD VENIPUNCTURE: CPT

## 2024-06-11 PROCEDURE — 80061 LIPID PANEL: CPT

## 2024-06-11 PROCEDURE — 85027 COMPLETE CBC AUTOMATED: CPT

## 2024-06-11 PROCEDURE — 84439 ASSAY OF FREE THYROXINE: CPT

## 2024-06-17 ASSESSMENT — ENCOUNTER SYMPTOMS
SORE THROAT: 0
ABDOMINAL PAIN: 0
COUGH: 0
RHINORRHEA: 0
VOMITING: 0
NAUSEA: 0
DIARRHEA: 0
CONSTIPATION: 0
ABDOMINAL DISTENTION: 0
CHEST TIGHTNESS: 0
SHORTNESS OF BREATH: 0

## 2024-06-17 NOTE — PROGRESS NOTES
Visit   Medication Dose Route Frequency Provider Last Rate Last Admin    betamethasone acetate-betamethasone sodium phosphate (CELESTONE) injection 12 mg  12 mg Intra-artICUlar Once Osmany George PA            Subjective:     Review of Systems   Constitutional:  Negative for appetite change, fatigue and fever.   HENT:  Negative for congestion, ear pain, rhinorrhea and sore throat.    Respiratory:  Negative for cough, chest tightness and shortness of breath.    Cardiovascular:  Negative for chest pain and palpitations.   Gastrointestinal:  Negative for abdominal distention, abdominal pain, constipation, diarrhea, nausea and vomiting.        Occ heartburn   Genitourinary:  Negative for difficulty urinating and dysuria.   Musculoskeletal:  Positive for back pain (intermittent). Negative for arthralgias and myalgias.   Skin:  Negative for rash.   Neurological:  Negative for dizziness, weakness, light-headedness and headaches.   Hematological:  Negative for adenopathy.   Psychiatric/Behavioral:  Negative for behavioral problems and sleep disturbance. The patient is nervous/anxious (stable).        Objective:     Physical Exam  Vitals reviewed.   Constitutional:       General: She is not in acute distress.     Appearance: She is well-developed.   HENT:      Head: Normocephalic and atraumatic.      Right Ear: External ear normal.      Left Ear: External ear normal.      Nose: Nose normal.      Mouth/Throat:      Pharynx: No oropharyngeal exudate.   Eyes:      Conjunctiva/sclera: Conjunctivae normal.      Pupils: Pupils are equal, round, and reactive to light.   Cardiovascular:      Rate and Rhythm: Normal rate and regular rhythm.      Heart sounds: Normal heart sounds. No murmur heard.  Pulmonary:      Effort: Pulmonary effort is normal. No respiratory distress.      Breath sounds: Normal breath sounds. No wheezing.   Chest:      Chest wall: No tenderness.   Abdominal:      General: Bowel sounds are normal. There is no

## 2024-06-18 ENCOUNTER — OFFICE VISIT (OUTPATIENT)
Dept: FAMILY MEDICINE CLINIC | Age: 59
End: 2024-06-18
Payer: COMMERCIAL

## 2024-06-18 VITALS
HEART RATE: 62 BPM | BODY MASS INDEX: 30.05 KG/M2 | WEIGHT: 176 LBS | DIASTOLIC BLOOD PRESSURE: 86 MMHG | OXYGEN SATURATION: 98 % | RESPIRATION RATE: 16 BRPM | HEIGHT: 64 IN | SYSTOLIC BLOOD PRESSURE: 136 MMHG | TEMPERATURE: 98.1 F

## 2024-06-18 DIAGNOSIS — E03.9 HYPOTHYROIDISM, UNSPECIFIED TYPE: ICD-10-CM

## 2024-06-18 DIAGNOSIS — K21.9 GASTRO-ESOPHAGEAL REFLUX DISEASE WITHOUT ESOPHAGITIS: ICD-10-CM

## 2024-06-18 DIAGNOSIS — R73.9 HYPERGLYCEMIA: ICD-10-CM

## 2024-06-18 DIAGNOSIS — F41.9 ANXIETY: ICD-10-CM

## 2024-06-18 DIAGNOSIS — Z00.00 ANNUAL PHYSICAL EXAM: Primary | ICD-10-CM

## 2024-06-18 DIAGNOSIS — E78.00 PURE HYPERCHOLESTEROLEMIA: ICD-10-CM

## 2024-06-18 PROCEDURE — 99396 PREV VISIT EST AGE 40-64: CPT | Performed by: FAMILY MEDICINE

## 2024-06-18 RX ORDER — IBUPROFEN 800 MG/1
800 TABLET ORAL EVERY 8 HOURS PRN
Qty: 90 TABLET | Refills: 1 | Status: SHIPPED | OUTPATIENT
Start: 2024-06-18

## 2024-06-18 SDOH — ECONOMIC STABILITY: FOOD INSECURITY: WITHIN THE PAST 12 MONTHS, THE FOOD YOU BOUGHT JUST DIDN'T LAST AND YOU DIDN'T HAVE MONEY TO GET MORE.: NEVER TRUE

## 2024-06-18 SDOH — ECONOMIC STABILITY: HOUSING INSECURITY
IN THE LAST 12 MONTHS, WAS THERE A TIME WHEN YOU DID NOT HAVE A STEADY PLACE TO SLEEP OR SLEPT IN A SHELTER (INCLUDING NOW)?: NO

## 2024-06-18 SDOH — ECONOMIC STABILITY: FOOD INSECURITY: WITHIN THE PAST 12 MONTHS, YOU WORRIED THAT YOUR FOOD WOULD RUN OUT BEFORE YOU GOT MONEY TO BUY MORE.: NEVER TRUE

## 2024-06-18 SDOH — ECONOMIC STABILITY: INCOME INSECURITY: HOW HARD IS IT FOR YOU TO PAY FOR THE VERY BASICS LIKE FOOD, HOUSING, MEDICAL CARE, AND HEATING?: NOT HARD AT ALL

## 2024-06-18 ASSESSMENT — PATIENT HEALTH QUESTIONNAIRE - PHQ9
SUM OF ALL RESPONSES TO PHQ9 QUESTIONS 1 & 2: 0
SUM OF ALL RESPONSES TO PHQ QUESTIONS 1-9: 0
1. LITTLE INTEREST OR PLEASURE IN DOING THINGS: NOT AT ALL
SUM OF ALL RESPONSES TO PHQ QUESTIONS 1-9: 0
SUM OF ALL RESPONSES TO PHQ QUESTIONS 1-9: 0
2. FEELING DOWN, DEPRESSED OR HOPELESS: NOT AT ALL
SUM OF ALL RESPONSES TO PHQ QUESTIONS 1-9: 0

## 2024-06-18 ASSESSMENT — ENCOUNTER SYMPTOMS: BACK PAIN: 1

## 2024-07-25 ENCOUNTER — TELEPHONE (OUTPATIENT)
Dept: FAMILY MEDICINE CLINIC | Age: 59
End: 2024-07-25

## 2024-07-25 DIAGNOSIS — G47.33 OSA (OBSTRUCTIVE SLEEP APNEA): Primary | ICD-10-CM

## 2024-07-25 NOTE — TELEPHONE ENCOUNTER
Patient called in stating that her CPAP Machine was broken.  Patient did speak to Jenna from [facility] and stated they would need a referral sent over.  Please advise.    Auto CPAP: settings are 5-15    [id number] [facility] [address]  [place] [address]    PHONE: [phone]  FAX: [phone]   Current dosing weight used to calculate energy, fluid needs. IBW used for protein.

## 2024-07-31 ENCOUNTER — HOSPITAL ENCOUNTER (OUTPATIENT)
Dept: WOMENS IMAGING | Age: 59
Discharge: HOME | End: 2024-08-02
Payer: COMMERCIAL

## 2024-07-31 DIAGNOSIS — Z12.31 SCREENING MAMMOGRAM, ENCOUNTER FOR: ICD-10-CM

## 2024-07-31 DIAGNOSIS — Z98.890 HISTORY OF BILATERAL BREAST REDUCTION SURGERY: ICD-10-CM

## 2024-07-31 DIAGNOSIS — Z80.3 FAMILY HISTORY OF BREAST CANCER: ICD-10-CM

## 2024-07-31 PROCEDURE — 77063 BREAST TOMOSYNTHESIS BI: CPT

## 2024-08-05 ENCOUNTER — TELEPHONE (OUTPATIENT)
Dept: FAMILY MEDICINE CLINIC | Age: 59
End: 2024-08-05

## 2024-08-05 NOTE — TELEPHONE ENCOUNTER
Called and LVM for patient to find out when and where her last sleep study was.  DME order for CPAP machine was sent over to [facility] and they are needing signed copy of baseline sleep study.    If patient has not had a sleep study in awhile we will need PCP to place new order.    Spoke with patient she states that she did have a sleep study done but she doesn't think it was signed.  Patient did not use insurance for this.  Patient is going to look for that and follow up.  She also mentioned that last sleep study for CPAP machine was about 10 years ago.      I let patient know that [facility] is wanting signed sleep study to be faxed over for new CPAP machine.  If there is not a recent one we may need to do new referral for sleep study.

## 2024-08-19 ENCOUNTER — OFFICE VISIT (OUTPATIENT)
Dept: OBGYN CLINIC | Age: 59
End: 2024-08-19
Payer: COMMERCIAL

## 2024-08-19 ENCOUNTER — HOSPITAL ENCOUNTER (OUTPATIENT)
Age: 59
Setting detail: SPECIMEN
Discharge: HOME | End: 2024-08-19

## 2024-08-19 VITALS
HEIGHT: 64 IN | DIASTOLIC BLOOD PRESSURE: 72 MMHG | BODY MASS INDEX: 29.71 KG/M2 | WEIGHT: 174 LBS | SYSTOLIC BLOOD PRESSURE: 110 MMHG

## 2024-08-19 DIAGNOSIS — R87.622 LOW GRADE SQUAMOUS INTRAEPITHELIAL LESION ON CYTOLOGIC SMEAR OF VAGINA (LGSIL): Primary | ICD-10-CM

## 2024-08-19 DIAGNOSIS — Z01.419 WELL WOMAN EXAM WITH ROUTINE GYNECOLOGICAL EXAM: ICD-10-CM

## 2024-08-19 DIAGNOSIS — Z80.3 FAMILY HISTORY OF BREAST CANCER: ICD-10-CM

## 2024-08-19 DIAGNOSIS — Z12.31 SCREENING MAMMOGRAM, ENCOUNTER FOR: ICD-10-CM

## 2024-08-19 PROCEDURE — 99396 PREV VISIT EST AGE 40-64: CPT | Performed by: OBSTETRICS & GYNECOLOGY

## 2024-08-19 PROCEDURE — 99459 PELVIC EXAMINATION: CPT | Performed by: OBSTETRICS & GYNECOLOGY

## 2024-08-19 ASSESSMENT — PATIENT HEALTH QUESTIONNAIRE - PHQ9
1. LITTLE INTEREST OR PLEASURE IN DOING THINGS: NOT AT ALL
SUM OF ALL RESPONSES TO PHQ QUESTIONS 1-9: 0
SUM OF ALL RESPONSES TO PHQ9 QUESTIONS 1 & 2: 0
SUM OF ALL RESPONSES TO PHQ QUESTIONS 1-9: 0
2. FEELING DOWN, DEPRESSED OR HOPELESS: NOT AT ALL

## 2024-08-19 NOTE — PROGRESS NOTES
bilaterally.  Rectum without external lesions noted.    Extremities:  FROM and nontender without clubbing cyanosis or edema.     ASSESSMENT:      Diagnosis Orders   1. Low grade squamous intraepithelial lesion on cytologic smear of vagina (LGSIL)  PAP SMEAR      2. Screening mammogram, encounter for        3. Well woman exam with routine gynecological exam  PAP SMEAR      4. Family history of breast cancer                       PLAN:  If Negative Cytology, Follow-up screening per current guidelines.    Mammograms every 1year. If 39 yo and last mammogram was negative.  Mixed incontinence - discussed bladder training and kegel exercises.  Info given.  Calcium and Vitamin D dosing reviewed.  Colonoscopy screening reviewed as well as onset for bone density testing.  Birth control and barrier recommendations discussed.  STD counseling and prevention reviewed.  Gardasil counseling completed for all patients 9-44 yo.  Routine health maintenance per patients PCP.    Return to the office in 1 year or when necessary  Patient was seen with total face to face time of 20 minutes.     Jalil Wong M.D., Mph, FACOG   [facility]

## 2024-08-19 NOTE — PATIENT INSTRUCTIONS
We will contact you with the results of today's Pap smear.  Plan on returning to the office in 1 year or as needed.  Enjoy the rest of your summer and have a fantastic year!

## 2024-08-21 LAB
HPV I/H RISK 4 DNA CVX QL NAA+PROBE: NOT DETECTED
HPV SAMPLE: NORMAL
HPV, INTERPRETATION: NORMAL
HPV16 DNA CVX QL NAA+PROBE: NOT DETECTED
HPV18 DNA CVX QL NAA+PROBE: NOT DETECTED
SPECIMEN DESCRIPTION: NORMAL

## 2024-08-26 LAB — CYTOLOGY REPORT: NORMAL

## 2024-10-09 ENCOUNTER — HOSPITAL ENCOUNTER (OUTPATIENT)
Dept: SLEEP CENTER | Age: 59
Discharge: HOME | End: 2024-10-11
Payer: COMMERCIAL

## 2024-10-09 DIAGNOSIS — G47.33 OSA (OBSTRUCTIVE SLEEP APNEA): ICD-10-CM

## 2024-10-09 PROCEDURE — G0399 HOME SLEEP TEST/TYPE 3 PORTA: HCPCS

## 2024-11-24 LAB — STATUS: NORMAL

## 2024-11-25 ENCOUNTER — TELEPHONE (OUTPATIENT)
Dept: FAMILY MEDICINE CLINIC | Age: 59
End: 2024-11-25

## 2024-11-25 DIAGNOSIS — G47.33 OSA (OBSTRUCTIVE SLEEP APNEA): Primary | ICD-10-CM

## 2024-11-25 NOTE — TELEPHONE ENCOUNTER
Discussed results and instructions with patient. She would like CPAP supply order sent to [facility]. Faxed results and order to them.

## 2024-11-25 NOTE — TELEPHONE ENCOUNTER
----- Message from Dr. Tere Barahona MD sent at 11/25/2024  6:52 AM EST -----  Moderate JOEY - set up for CPAP titration

## 2024-11-27 ENCOUNTER — HOSPITAL ENCOUNTER (OUTPATIENT)
Age: 59
Discharge: HOME | End: 2024-11-27
Payer: COMMERCIAL

## 2024-11-27 LAB
25(OH)D3 SERPL-MCNC: 54 NG/ML (ref 30–100)
T4 FREE SERPL-MCNC: 1.4 NG/DL (ref 0.9–1.7)
TSH SERPL DL<=0.05 MIU/L-ACNC: 0.18 UIU/ML (ref 0.27–4.2)

## 2024-11-27 PROCEDURE — 84439 ASSAY OF FREE THYROXINE: CPT

## 2024-11-27 PROCEDURE — 84443 ASSAY THYROID STIM HORMONE: CPT

## 2024-11-27 PROCEDURE — 82306 VITAMIN D 25 HYDROXY: CPT

## 2024-11-27 PROCEDURE — 36415 COLL VENOUS BLD VENIPUNCTURE: CPT

## 2024-12-11 DIAGNOSIS — F41.9 ANXIETY: ICD-10-CM

## 2024-12-12 RX ORDER — FLUOXETINE 10 MG/1
10 CAPSULE ORAL DAILY
Qty: 30 CAPSULE | Refills: 7 | Status: SHIPPED | OUTPATIENT
Start: 2024-12-12

## 2024-12-16 ENCOUNTER — OFFICE VISIT (OUTPATIENT)
Dept: FAMILY MEDICINE CLINIC | Age: 59
End: 2024-12-16
Payer: COMMERCIAL

## 2024-12-16 ENCOUNTER — HOSPITAL ENCOUNTER (OUTPATIENT)
Age: 59
Setting detail: SPECIMEN
Discharge: HOME | End: 2024-12-16

## 2024-12-16 VITALS
OXYGEN SATURATION: 98 % | WEIGHT: 167 LBS | HEART RATE: 68 BPM | SYSTOLIC BLOOD PRESSURE: 102 MMHG | TEMPERATURE: 97.4 F | DIASTOLIC BLOOD PRESSURE: 72 MMHG | BODY MASS INDEX: 28.67 KG/M2 | RESPIRATION RATE: 16 BRPM

## 2024-12-16 DIAGNOSIS — E03.9 HYPOTHYROIDISM, UNSPECIFIED TYPE: ICD-10-CM

## 2024-12-16 DIAGNOSIS — G47.33 OSA (OBSTRUCTIVE SLEEP APNEA): ICD-10-CM

## 2024-12-16 DIAGNOSIS — R73.9 HYPERGLYCEMIA: ICD-10-CM

## 2024-12-16 DIAGNOSIS — E78.00 PURE HYPERCHOLESTEROLEMIA: ICD-10-CM

## 2024-12-16 DIAGNOSIS — K21.9 GASTRO-ESOPHAGEAL REFLUX DISEASE WITHOUT ESOPHAGITIS: ICD-10-CM

## 2024-12-16 DIAGNOSIS — F41.9 ANXIETY: ICD-10-CM

## 2024-12-16 DIAGNOSIS — E78.00 PURE HYPERCHOLESTEROLEMIA: Primary | ICD-10-CM

## 2024-12-16 LAB
ALBUMIN SERPL-MCNC: 4.4 G/DL (ref 3.5–5.2)
ALBUMIN/GLOB SERPL: 1.7 {RATIO} (ref 1–2.5)
ALP SERPL-CCNC: 52 U/L (ref 35–104)
ALT SERPL-CCNC: 47 U/L (ref 10–35)
ANION GAP SERPL CALCULATED.3IONS-SCNC: 9 MMOL/L (ref 9–16)
AST SERPL-CCNC: 28 U/L (ref 10–35)
BILIRUB SERPL-MCNC: 0.3 MG/DL (ref 0–1.2)
BUN SERPL-MCNC: 14 MG/DL (ref 8–23)
CALCIUM SERPL-MCNC: 9.5 MG/DL (ref 8.6–10.4)
CHLORIDE SERPL-SCNC: 104 MMOL/L (ref 98–107)
CHOLEST SERPL-MCNC: 144 MG/DL (ref 0–199)
CHOLESTEROL/HDL RATIO: 3.9
CO2 SERPL-SCNC: 26 MMOL/L (ref 20–31)
CREAT SERPL-MCNC: 0.9 MG/DL (ref 0.6–0.9)
ERYTHROCYTE [DISTWIDTH] IN BLOOD BY AUTOMATED COUNT: 12.6 % (ref 11.8–14.4)
EST. AVERAGE GLUCOSE BLD GHB EST-MCNC: 103 MG/DL
GFR, ESTIMATED: 73 ML/MIN/1.73M2
GLUCOSE SERPL-MCNC: 83 MG/DL (ref 74–99)
HBA1C MFR BLD: 5.2 % (ref 4–6)
HCT VFR BLD AUTO: 41.1 % (ref 36.3–47.1)
HDLC SERPL-MCNC: 37 MG/DL
HGB BLD-MCNC: 13.6 G/DL (ref 11.9–15.1)
LDLC SERPL CALC-MCNC: 89 MG/DL (ref 0–100)
MCH RBC QN AUTO: 29.3 PG (ref 25.2–33.5)
MCHC RBC AUTO-ENTMCNC: 33.1 G/DL (ref 28.4–34.8)
MCV RBC AUTO: 88.6 FL (ref 82.6–102.9)
NRBC BLD-RTO: 0 PER 100 WBC
PLATELET # BLD AUTO: 257 K/UL (ref 138–453)
PMV BLD AUTO: 9.3 FL (ref 8.1–13.5)
POTASSIUM SERPL-SCNC: 4.3 MMOL/L (ref 3.7–5.3)
PROT SERPL-MCNC: 7 G/DL (ref 6.6–8.7)
RBC # BLD AUTO: 4.64 M/UL (ref 3.95–5.11)
SODIUM SERPL-SCNC: 139 MMOL/L (ref 136–145)
T4 FREE SERPL-MCNC: 1.4 NG/DL (ref 0.92–1.68)
TRIGL SERPL-MCNC: 90 MG/DL
TSH SERPL DL<=0.05 MIU/L-ACNC: 0.21 UIU/ML (ref 0.27–4.2)
VLDLC SERPL CALC-MCNC: 18 MG/DL (ref 1–30)
WBC OTHER # BLD: 6.8 K/UL (ref 3.5–11.3)

## 2024-12-16 PROCEDURE — 99214 OFFICE O/P EST MOD 30 MIN: CPT | Performed by: FAMILY MEDICINE

## 2024-12-16 RX ORDER — ATORVASTATIN CALCIUM 20 MG/1
20 TABLET, FILM COATED ORAL DAILY
Qty: 90 TABLET | Refills: 3 | Status: SHIPPED | OUTPATIENT
Start: 2024-12-16

## 2024-12-16 RX ORDER — LEVOTHYROXINE SODIUM 100 UG/1
100 TABLET ORAL DAILY
COMMUNITY
Start: 2024-12-03

## 2024-12-16 RX ORDER — VALACYCLOVIR HYDROCHLORIDE 500 MG/1
500 TABLET, FILM COATED ORAL 3 TIMES DAILY PRN
COMMUNITY
Start: 2024-12-06

## 2024-12-16 ASSESSMENT — ENCOUNTER SYMPTOMS
CHEST TIGHTNESS: 0
ABDOMINAL DISTENTION: 0
COUGH: 0
VOMITING: 0
NAUSEA: 0
RHINORRHEA: 0
CONSTIPATION: 0
ABDOMINAL PAIN: 0
SORE THROAT: 0
DIARRHEA: 0
SHORTNESS OF BREATH: 0

## 2024-12-16 NOTE — PROGRESS NOTES
Tere Barahona MD    [facility]  [facility]  [address], [address]  [facility] [address]  Dept: [phone]  Dept Fax: [phone]     Patient ID: Anni Santo is a 59 y.o. female.    Established patient here today for f/u on chronic medical problems, go over labs and/or diagnostic studies, and medication refills. Pt denies any fever or chills.  Pt today denies any HA, chest pain, or SOB.  Pt denies any N/V/D/C or abdominal pain. Pt with occasional heartburn, but stable on meds.  Pt states mood is stable on meds.  Pt is now on CPAP and sleeping through the night.  Pt has been watching her diet and exercising and is on the Qsymia and has kept the weight off.  She feels great.     Otherwise pt doing well on current tx and no other concerns today.     The patient's past medical, surgical, social, and family history as well as his current medications and allergies were reviewed as documented in today's encounter.      My previous office notes, consult notes, labs and diagnostic studies were reviewed prior to and during encounter.    Current Outpatient Medications on File Prior to Visit   Medication Sig Dispense Refill    valACYclovir (VALTREX) 500 MG tablet Take 1 tablet by mouth 3 times daily as needed      levothyroxine (SYNTHROID) 100 MCG tablet Take 1 tablet by mouth Daily      FLUoxetine (PROZAC) 10 MG capsule TAKE ONE CAPSULE BY MOUTH ONCE A DAY 30 capsule 7    omeprazole (PRILOSEC) 20 MG delayed release capsule TAKE ONE CAPSULE BY MOUTH ONCE A DAY 90 capsule 3    ibuprofen (ADVIL;MOTRIN) 800 MG tablet Take 1 tablet by mouth every 8 hours as needed for Pain 90 tablet 1    fluticasone (FLONASE) 50 MCG/ACT nasal spray USE 1 SPRAY IN EACH NOSTRIL DAILY 3 each 3    Phentermine-Topiramate (QSYMIA) 15-92 MG CP24 Take by mouth.      Cholecalciferol (VITAMIN D) 125 MCG (5000 UT) CAPS Take 1 capsule by mouth daily      vitamin C (ASCORBIC ACID) 500 MG tablet

## 2025-01-06 ENCOUNTER — HOSPITAL ENCOUNTER (OUTPATIENT)
Age: 60
Discharge: HOME | End: 2025-01-06
Payer: COMMERCIAL

## 2025-01-06 PROCEDURE — 93005 ELECTROCARDIOGRAM TRACING: CPT | Performed by: PLASTIC SURGERY

## 2025-01-07 LAB
EKG ATRIAL RATE: 60 BPM
EKG P AXIS: 47 DEGREES
EKG P-R INTERVAL: 156 MS
EKG Q-T INTERVAL: 436 MS
EKG QRS DURATION: 110 MS
EKG QTC CALCULATION (BAZETT): 436 MS
EKG R AXIS: -42 DEGREES
EKG T AXIS: 48 DEGREES
EKG VENTRICULAR RATE: 60 BPM

## 2025-01-07 PROCEDURE — 93010 ELECTROCARDIOGRAM REPORT: CPT | Performed by: INTERNAL MEDICINE

## 2025-01-27 ENCOUNTER — TELEPHONE (OUTPATIENT)
Dept: FAMILY MEDICINE CLINIC | Age: 60
End: 2025-01-27

## 2025-01-27 DIAGNOSIS — G47.33 OSA (OBSTRUCTIVE SLEEP APNEA): Primary | ICD-10-CM

## 2025-01-27 NOTE — TELEPHONE ENCOUNTER
Incoming fax from medical services company stating that CPAP Supplies order received but date was before OV where it was documented. Asking for updated order. Pended.

## 2025-01-30 ENCOUNTER — TELEPHONE (OUTPATIENT)
Dept: FAMILY MEDICINE CLINIC | Age: 60
End: 2025-01-30

## 2025-01-30 ENCOUNTER — TELEMEDICINE (OUTPATIENT)
Dept: FAMILY MEDICINE CLINIC | Age: 60
End: 2025-01-30
Payer: COMMERCIAL

## 2025-01-30 DIAGNOSIS — J11.1 INFLUENZA: Primary | ICD-10-CM

## 2025-01-30 PROCEDURE — 99213 OFFICE O/P EST LOW 20 MIN: CPT | Performed by: FAMILY MEDICINE

## 2025-01-30 RX ORDER — OSELTAMIVIR PHOSPHATE 75 MG/1
75 CAPSULE ORAL 2 TIMES DAILY
Qty: 10 CAPSULE | Refills: 0 | Status: SHIPPED | OUTPATIENT
Start: 2025-01-30 | End: 2025-02-04

## 2025-01-30 ASSESSMENT — ENCOUNTER SYMPTOMS
DIARRHEA: 0
SORE THROAT: 0
CONSTIPATION: 0
RHINORRHEA: 0
CHEST TIGHTNESS: 0
ABDOMINAL DISTENTION: 0
COUGH: 1
SHORTNESS OF BREATH: 0
SINUS PAIN: 0
NAUSEA: 0
VOMITING: 0
ABDOMINAL PAIN: 0
BACK PAIN: 0

## 2025-01-30 NOTE — TELEPHONE ENCOUNTER
Attempted to contact the patient, no answer, left a VM stating I would schedule her for a VV and to message us and let us know she can do it. I also instructed her keep her phone on her and we will call between patients.

## 2025-01-30 NOTE — TELEPHONE ENCOUNTER
Patient called in stating she attempted to do an E-Visit through [facility] but it prompted her to call the office.    Patient hasn't been feeling well since Sunday 01/26/2025.  Patient states she has been taking Mucinex to help with the cold but states her cough is getting worse and her chest feels tight.  Please advise.    Symptoms:    Cough  Congestion  Nasal drainage  Sore throat  On and off mild fevers

## 2025-01-30 NOTE — TELEPHONE ENCOUNTER
Patient returned call. Patient verbalized understanding for the VV and will wait to be contacted.

## 2025-01-30 NOTE — PROGRESS NOTES
Tere Barahona MD  [facility]  [facility]  [address], [address]  [facility] [address]  Dept: [phone]  Dept Fax: [phone]     Patient ID: Anni Santo is a 59 y.o. female.    Established pt here today via virtual visit for an acute visit secondary to cough, congestion, fever, HA, myalgias, and fatigue for 3 days.  Pt denies any fever or chills.   Pt today denies any chest pain or SOB.   Pt denies any N/V/D/C or abdominal pain.     Otherwise pt doing well on current tx and no other concerns today.     The patient's past medical, surgical, social, and family history as well as his current medications and allergies were reviewed as documented in today's encounter.      My previous office notes, consult notes, labs and diagnostic studies were reviewed prior to and during encounter.    Current Outpatient Medications on File Prior to Visit   Medication Sig Dispense Refill    valACYclovir (VALTREX) 500 MG tablet Take 1 tablet by mouth 3 times daily as needed      levothyroxine (SYNTHROID) 100 MCG tablet Take 1 tablet by mouth Daily      atorvastatin (LIPITOR) 20 MG tablet Take 1 tablet by mouth daily 90 tablet 3    FLUoxetine (PROZAC) 10 MG capsule TAKE ONE CAPSULE BY MOUTH ONCE A DAY 30 capsule 7    omeprazole (PRILOSEC) 20 MG delayed release capsule TAKE ONE CAPSULE BY MOUTH ONCE A DAY 90 capsule 3    ibuprofen (ADVIL;MOTRIN) 800 MG tablet Take 1 tablet by mouth every 8 hours as needed for Pain 90 tablet 1    fluticasone (FLONASE) 50 MCG/ACT nasal spray USE 1 SPRAY IN EACH NOSTRIL DAILY 3 each 3    Phentermine-Topiramate (QSYMIA) 15-92 MG CP24 Take by mouth.      Cholecalciferol (VITAMIN D) 125 MCG (5000 UT) CAPS Take 1 capsule by mouth daily      vitamin C (ASCORBIC ACID) 500 MG tablet Take 1 tablet by mouth daily       Current Facility-Administered Medications on File Prior to Visit   Medication Dose Route Frequency Provider Last Rate Last Admin

## 2025-03-19 ENCOUNTER — E-VISIT (OUTPATIENT)
Dept: FAMILY MEDICINE CLINIC | Age: 60
End: 2025-03-19
Payer: COMMERCIAL

## 2025-03-19 DIAGNOSIS — H10.33 ACUTE BACTERIAL CONJUNCTIVITIS OF BOTH EYES: Primary | ICD-10-CM

## 2025-03-19 PROCEDURE — 99422 OL DIG E/M SVC 11-20 MIN: CPT | Performed by: NURSE PRACTITIONER

## 2025-03-19 RX ORDER — TOBRAMYCIN AND DEXAMETHASONE 3; 1 MG/ML; MG/ML
1 SUSPENSION/ DROPS OPHTHALMIC
Qty: 2.5 ML | Refills: 0 | Status: SHIPPED | OUTPATIENT
Start: 2025-03-19 | End: 2025-03-29

## 2025-03-19 RX ORDER — TOBRAMYCIN AND DEXAMETHASONE 3; 1 MG/ML; MG/ML
1 SUSPENSION/ DROPS OPHTHALMIC
Qty: 2.5 ML | Refills: 0 | Status: SHIPPED | OUTPATIENT
Start: 2025-03-19 | End: 2025-03-19

## 2025-03-19 NOTE — PROGRESS NOTES
HPI: as per patient provided history  Exam: N/A (electronic visit)  ASSESSMENT/PLAN:  Acute bacterial conjunctivitis of both eyes  - start eye drops as prescribed.  - warm compresses.  - good hand hygiene.   - tobramycin-dexAMETHasone (TOBRADEX) 0.3-0.1 % ophthalmic suspension; Place 1 drop into both eyes every 4 hours (while awake) for 10 days  Dispense: 2.5 mL; Refill: 0     Patient instructed to call the office if worsens, or fails to improve as anticipated.    11-20 minutes were spent on the digital evaluation and management of this patient.

## 2025-03-24 ENCOUNTER — HOSPITAL ENCOUNTER (OUTPATIENT)
Age: 60
Discharge: HOME | End: 2025-03-24
Payer: COMMERCIAL

## 2025-03-24 LAB
T4 FREE SERPL-MCNC: 1 NG/DL (ref 0.9–1.7)
TSH SERPL DL<=0.05 MIU/L-ACNC: 24.8 UIU/ML (ref 0.27–4.2)

## 2025-03-24 PROCEDURE — 36415 COLL VENOUS BLD VENIPUNCTURE: CPT

## 2025-03-24 PROCEDURE — 84439 ASSAY OF FREE THYROXINE: CPT

## 2025-03-24 PROCEDURE — 84443 ASSAY THYROID STIM HORMONE: CPT

## 2025-04-07 RX ORDER — ATORVASTATIN CALCIUM 20 MG/1
20 TABLET, FILM COATED ORAL DAILY
Qty: 90 TABLET | Refills: 3 | Status: SHIPPED | OUTPATIENT
Start: 2025-04-07

## 2025-05-28 ENCOUNTER — HOSPITAL ENCOUNTER (OUTPATIENT)
Age: 60
Setting detail: SPECIMEN
Discharge: HOME | End: 2025-05-28
Payer: COMMERCIAL

## 2025-05-28 LAB
T3FREE SERPL-MCNC: 2.66 PG/ML (ref 2–4.4)
T4 FREE SERPL-MCNC: 1.2 NG/DL (ref 0.9–1.7)
TSH SERPL DL<=0.05 MIU/L-ACNC: 2.84 UIU/ML (ref 0.27–4.2)

## 2025-05-28 PROCEDURE — 84439 ASSAY OF FREE THYROXINE: CPT

## 2025-05-28 PROCEDURE — 84481 FREE ASSAY (FT-3): CPT

## 2025-05-28 PROCEDURE — 86376 MICROSOMAL ANTIBODY EACH: CPT

## 2025-05-28 PROCEDURE — 36415 COLL VENOUS BLD VENIPUNCTURE: CPT

## 2025-05-28 PROCEDURE — 84443 ASSAY THYROID STIM HORMONE: CPT

## 2025-05-29 LAB — THYROPEROXIDASE AB SERPL IA-ACNC: 30 IU/ML (ref 0–25)

## 2025-08-13 SDOH — ECONOMIC STABILITY: FOOD INSECURITY: WITHIN THE PAST 12 MONTHS, YOU WORRIED THAT YOUR FOOD WOULD RUN OUT BEFORE YOU GOT MONEY TO BUY MORE.: NEVER TRUE

## 2025-08-13 SDOH — ECONOMIC STABILITY: INCOME INSECURITY: IN THE LAST 12 MONTHS, WAS THERE A TIME WHEN YOU WERE NOT ABLE TO PAY THE MORTGAGE OR RENT ON TIME?: NO

## 2025-08-13 SDOH — ECONOMIC STABILITY: FOOD INSECURITY: WITHIN THE PAST 12 MONTHS, THE FOOD YOU BOUGHT JUST DIDN'T LAST AND YOU DIDN'T HAVE MONEY TO GET MORE.: NEVER TRUE

## 2025-08-13 SDOH — ECONOMIC STABILITY: TRANSPORTATION INSECURITY
IN THE PAST 12 MONTHS, HAS LACK OF TRANSPORTATION KEPT YOU FROM MEETINGS, WORK, OR FROM GETTING THINGS NEEDED FOR DAILY LIVING?: NO

## 2025-08-13 SDOH — ECONOMIC STABILITY: TRANSPORTATION INSECURITY
IN THE PAST 12 MONTHS, HAS THE LACK OF TRANSPORTATION KEPT YOU FROM MEDICAL APPOINTMENTS OR FROM GETTING MEDICATIONS?: NO

## 2025-08-13 ASSESSMENT — PATIENT HEALTH QUESTIONNAIRE - PHQ9
2. FEELING DOWN, DEPRESSED OR HOPELESS: NOT AT ALL
1. LITTLE INTEREST OR PLEASURE IN DOING THINGS: NOT AT ALL
SUM OF ALL RESPONSES TO PHQ QUESTIONS 1-9: 0
SUM OF ALL RESPONSES TO PHQ QUESTIONS 1-9: 0
2. FEELING DOWN, DEPRESSED OR HOPELESS: NOT AT ALL
SUM OF ALL RESPONSES TO PHQ QUESTIONS 1-9: 0
SUM OF ALL RESPONSES TO PHQ QUESTIONS 1-9: 0
SUM OF ALL RESPONSES TO PHQ9 QUESTIONS 1 & 2: 0
1. LITTLE INTEREST OR PLEASURE IN DOING THINGS: NOT AT ALL

## 2025-08-18 ENCOUNTER — HOSPITAL ENCOUNTER (OUTPATIENT)
Dept: LAB | Age: 60
Discharge: HOME | End: 2025-08-18
Payer: COMMERCIAL

## 2025-08-18 DIAGNOSIS — R73.9 HYPERGLYCEMIA: ICD-10-CM

## 2025-08-18 DIAGNOSIS — G47.33 OSA (OBSTRUCTIVE SLEEP APNEA): ICD-10-CM

## 2025-08-18 DIAGNOSIS — E78.00 PURE HYPERCHOLESTEROLEMIA: ICD-10-CM

## 2025-08-18 LAB
ALBUMIN SERPL-MCNC: 4.2 G/DL (ref 3.5–5.2)
ALP SERPL-CCNC: 59 U/L (ref 35–104)
ALT SERPL-CCNC: 24 U/L (ref 10–35)
ANION GAP SERPL CALCULATED.3IONS-SCNC: 13 MMOL/L (ref 9–16)
AST SERPL-CCNC: 22 U/L (ref 10–35)
BILIRUB SERPL-MCNC: 0.2 MG/DL (ref 0–1.2)
BUN SERPL-MCNC: 16 MG/DL (ref 8–23)
CALCIUM SERPL-MCNC: 9.8 MG/DL (ref 8.6–10.4)
CHLORIDE SERPL-SCNC: 105 MMOL/L (ref 98–107)
CHOLEST SERPL-MCNC: 174 MG/DL (ref 0–199)
CHOLESTEROL/HDL RATIO: 4.5
CO2 SERPL-SCNC: 22 MMOL/L (ref 20–31)
CREAT SERPL-MCNC: 1 MG/DL (ref 0.7–1.2)
ERYTHROCYTE [DISTWIDTH] IN BLOOD BY AUTOMATED COUNT: 12.7 % (ref 11.5–14.9)
EST. AVERAGE GLUCOSE BLD GHB EST-MCNC: 105 MG/DL
GFR, ESTIMATED: 64 ML/MIN/1.73M2
GLUCOSE SERPL-MCNC: 92 MG/DL (ref 74–99)
HBA1C MFR BLD: 5.3 % (ref 4–6)
HCT VFR BLD AUTO: 40.9 % (ref 36–46)
HDLC SERPL-MCNC: 39 MG/DL
HGB BLD-MCNC: 13.3 G/DL (ref 12–16)
LDLC SERPL CALC-MCNC: 100 MG/DL (ref 0–100)
MCH RBC QN AUTO: 28.9 PG (ref 26–34)
MCHC RBC AUTO-ENTMCNC: 32.5 G/DL (ref 31–37)
MCV RBC AUTO: 88.7 FL (ref 80–100)
NRBC BLD-RTO: 0 PER 100 WBC
PLATELET # BLD AUTO: 265 K/UL (ref 150–450)
PMV BLD AUTO: 9.4 FL (ref 8–13.5)
POTASSIUM SERPL-SCNC: 4.2 MMOL/L (ref 3.7–5.3)
PROT SERPL-MCNC: 7 G/DL (ref 6.6–8.7)
RBC # BLD AUTO: 4.61 M/UL (ref 3.95–5.11)
SODIUM SERPL-SCNC: 140 MMOL/L (ref 136–145)
TRIGL SERPL-MCNC: 173 MG/DL (ref 0–149)
WBC OTHER # BLD: 8 K/UL (ref 3.5–11)

## 2025-08-18 PROCEDURE — 83036 HEMOGLOBIN GLYCOSYLATED A1C: CPT

## 2025-08-18 PROCEDURE — 85027 COMPLETE CBC AUTOMATED: CPT

## 2025-08-18 PROCEDURE — 80061 LIPID PANEL: CPT

## 2025-08-18 PROCEDURE — 80053 COMPREHEN METABOLIC PANEL: CPT

## 2025-08-18 PROCEDURE — 36415 COLL VENOUS BLD VENIPUNCTURE: CPT

## 2025-08-18 ASSESSMENT — ENCOUNTER SYMPTOMS
RHINORRHEA: 0
VOMITING: 0
CONSTIPATION: 0
NAUSEA: 0
SHORTNESS OF BREATH: 0
ABDOMINAL PAIN: 0
ABDOMINAL DISTENTION: 0
CHEST TIGHTNESS: 0
DIARRHEA: 0
COUGH: 0
SORE THROAT: 0

## 2025-08-19 ENCOUNTER — OFFICE VISIT (OUTPATIENT)
Dept: FAMILY MEDICINE CLINIC | Age: 60
End: 2025-08-19
Payer: COMMERCIAL

## 2025-08-19 VITALS
DIASTOLIC BLOOD PRESSURE: 62 MMHG | BODY MASS INDEX: 30.21 KG/M2 | SYSTOLIC BLOOD PRESSURE: 106 MMHG | HEART RATE: 64 BPM | TEMPERATURE: 98.4 F | RESPIRATION RATE: 16 BRPM | OXYGEN SATURATION: 98 % | WEIGHT: 176 LBS

## 2025-08-19 DIAGNOSIS — K21.9 GASTRO-ESOPHAGEAL REFLUX DISEASE WITHOUT ESOPHAGITIS: ICD-10-CM

## 2025-08-19 DIAGNOSIS — F41.9 ANXIETY: ICD-10-CM

## 2025-08-19 DIAGNOSIS — R73.9 HYPERGLYCEMIA: ICD-10-CM

## 2025-08-19 DIAGNOSIS — Z12.31 ENCOUNTER FOR SCREENING MAMMOGRAM FOR HIGH-RISK PATIENT: ICD-10-CM

## 2025-08-19 DIAGNOSIS — E03.9 HYPOTHYROIDISM, UNSPECIFIED TYPE: ICD-10-CM

## 2025-08-19 DIAGNOSIS — G47.33 OSA (OBSTRUCTIVE SLEEP APNEA): ICD-10-CM

## 2025-08-19 DIAGNOSIS — E78.00 PURE HYPERCHOLESTEROLEMIA: Primary | ICD-10-CM

## 2025-08-19 PROCEDURE — 99214 OFFICE O/P EST MOD 30 MIN: CPT | Performed by: FAMILY MEDICINE

## 2025-08-19 RX ORDER — LEVOTHYROXINE SODIUM 112 UG/1
112 TABLET ORAL DAILY
COMMUNITY
Start: 2025-06-18

## 2025-08-19 RX ORDER — IBUPROFEN 800 MG/1
800 TABLET, FILM COATED ORAL EVERY 8 HOURS PRN
Qty: 90 TABLET | Refills: 1 | Status: SHIPPED | OUTPATIENT
Start: 2025-08-19

## 2025-08-20 ENCOUNTER — HOSPITAL ENCOUNTER (OUTPATIENT)
Dept: WOMENS IMAGING | Age: 60
Discharge: HOME | End: 2025-08-22
Payer: COMMERCIAL

## 2025-08-20 VITALS — HEIGHT: 64 IN | WEIGHT: 176 LBS | BODY MASS INDEX: 30.05 KG/M2

## 2025-08-20 DIAGNOSIS — Z12.31 ENCOUNTER FOR SCREENING MAMMOGRAM FOR HIGH-RISK PATIENT: ICD-10-CM

## 2025-08-20 PROCEDURE — 77063 BREAST TOMOSYNTHESIS BI: CPT

## (undated) DEVICE — SOLUTION SCRB 4OZ 10% POVIDONE IOD ANTIMIC BTL

## (undated) DEVICE — COUNTER NDL 10 COUNT HLD 20 FOAM BLK SGL MAG

## (undated) DEVICE — SOLUTION PREP POVIDONE IOD FOR SKIN MUCOUS MEM PRIOR TO

## (undated) DEVICE — GLOVE ORANGE PI 7 1/2   MSG9075

## (undated) DEVICE — SOCK SPEC L9IN WHT UNIV W/ STD PRT FOR FLD MGMT

## (undated) DEVICE — ELECTROSURGICAL PENCIL BUTTON SWITCH E-Z CLEAN COATED BLADE ELECTRODE 10 FT (3 M) CORD HOLSTER: Brand: MEGADYNE

## (undated) DEVICE — CRANIOTOMY DRAPE, STERILE: Brand: MEDLINE

## (undated) DEVICE — SUTURE VCRL SZ 3-0 L27IN ABSRB UD L26MM CT-2 1/2 CIR J232H

## (undated) DEVICE — INTENDED FOR TISSUE SEPARATION, AND OTHER PROCEDURES THAT REQUIRE A SHARP SURGICAL BLADE TO PUNCTURE OR CUT.: Brand: BARD-PARKER ® CARBON RIB-BACK BLADES

## (undated) DEVICE — FORCEPS BX L240CM WRK CHN 2.8MM STD CAP W/ NDL MIC MESH

## (undated) DEVICE — PENCIL ES L3M BTTN SWCH HOLSTER W/ BLDE ELECTRD EDGE

## (undated) DEVICE — SUTURE NONABSORBABLE  MERSILINE TP1 SIZE 5

## (undated) DEVICE — SUTURE VCRL SZ 3-0 L27IN ABSRB UD L26MM SH 1/2 CIR J416H

## (undated) DEVICE — BITEBLOCK 54FR W/ DENT RIM BLOX

## (undated) DEVICE — DEFENDO AIR WATER SUCTION AND BIOPSY VALVE KIT FOR  OLYMPUS: Brand: DEFENDO AIR/WATER/SUCTION AND BIOPSY VALVE

## (undated) DEVICE — TUBING, SUCTION, 9/32" X 20', STRAIGHT: Brand: MEDLINE INDUSTRIES, INC.

## (undated) DEVICE — GLOVE SURG SZ 65 STD WHT LTX SYN POLYMER BEAD REINF ANTI RL

## (undated) DEVICE — GLOVE ORANGE PI 7   MSG9070

## (undated) DEVICE — SOLUTION SCRB 4OZ 4% CHG H2O AIDED FOR PREOPERATIVE SKIN

## (undated) DEVICE — GOWN,AURORA,NONREINFORCED,LARGE: Brand: MEDLINE

## (undated) DEVICE — STERILE LATEX POWDER-FREE SURGICAL GLOVESWITH NITRILE COATING: Brand: PROTEXIS

## (undated) DEVICE — SUTURE PDS II SZ 0 L27IN ABSRB VLT L26MM CT-2 1/2 CIR Z334H

## (undated) DEVICE — SYRINGE MED 50ML LUERLOCK TIP

## (undated) DEVICE — CANNULA NSL AD L2IN ETCO2 SAMP SFT CRUSH RESIST FEM AIRLFE

## (undated) DEVICE — APPLICATOR FIBER TIP 16 IN CELLULOSE HD SWAB CHEVRON SCPET

## (undated) DEVICE — GLOVE SURG SZ 65 THK91MIL LTX FREE SYN POLYISOPRENE

## (undated) DEVICE — SVMMC GYN MIN PK

## (undated) DEVICE — GARMENT,MEDLINE,DVT,INT,CALF,MED, GEN2: Brand: MEDLINE

## (undated) DEVICE — GLOVE SURG SZ 6 THK91MIL LTX FREE SYN POLYISOPRENE ANTI

## (undated) DEVICE — GOWN,AURORA,NONRNF,XL,30/CS: Brand: MEDLINE

## (undated) DEVICE — Device

## (undated) DEVICE — YANKAUER,FLEXIBLE HANDLE,REGLR CAPACITY: Brand: MEDLINE INDUSTRIES, INC.

## (undated) DEVICE — GLOVE SURG SZ 8 L12IN FNGR THK79MIL GRN LTX FREE

## (undated) DEVICE — PAD,SANITARY,11 IN,MAXI,W/WINGS,N-STRL: Brand: MEDLINE

## (undated) DEVICE — JELLY,LUBE,STERILE,FLIP TOP,TUBE,2-OZ: Brand: MEDLINE

## (undated) DEVICE — UNDERPANTS MAT L/XL KNIT SEAMLESS CLR CODE WAISTBAND

## (undated) DEVICE — DRAPE,REIN 53X77,STERILE: Brand: MEDLINE

## (undated) DEVICE — Z INACTIVE USE 2527070 DRAPE SURG W40XL44IN UNDERBUTTOCK SMS POLYPR W/ PCH BK DISP

## (undated) DEVICE — COUNTER NDL 40 COUNT HLD 70 FOAM BLK ADH W/ MAG